# Patient Record
Sex: MALE | Race: WHITE | NOT HISPANIC OR LATINO | URBAN - METROPOLITAN AREA
[De-identification: names, ages, dates, MRNs, and addresses within clinical notes are randomized per-mention and may not be internally consistent; named-entity substitution may affect disease eponyms.]

---

## 2022-09-23 ENCOUNTER — OFFICE (OUTPATIENT)
Dept: URBAN - METROPOLITAN AREA CLINIC 18 | Facility: CLINIC | Age: 52
End: 2022-09-23
Payer: COMMERCIAL

## 2022-09-23 VITALS
SYSTOLIC BLOOD PRESSURE: 124 MMHG | HEART RATE: 78 BPM | HEIGHT: 64 IN | DIASTOLIC BLOOD PRESSURE: 82 MMHG | WEIGHT: 219 LBS

## 2022-09-23 DIAGNOSIS — K70.31 ALCOHOLIC CIRRHOSIS OF LIVER WITH ASCITES: ICD-10-CM

## 2022-09-23 LAB
AFP- TUMOR MARKER: AFP - TUMOR MARKER: 7.9 NG/ML — HIGH
BASIC METABOLIC PANEL: BLOOD UREA NITROGEN: 9 MG/DL
BASIC METABOLIC PANEL: BUN/CREAT RATIO: 15
BASIC METABOLIC PANEL: CALCIUM: 10.4 MG/DL
BASIC METABOLIC PANEL: CHLORIDE: 97 MEQ/L
BASIC METABOLIC PANEL: CO2: 22 MEQ/L
BASIC METABOLIC PANEL: CREATININE: 0.6 MG/DL
BASIC METABOLIC PANEL: FASTING STATUS: (no result)
BASIC METABOLIC PANEL: GLOMERULAR FILTRATION RATE (GFR): 117 MLS/MIN/1.73M2
BASIC METABOLIC PANEL: GLUCOSE,RANDOM: 111 MG/DL — HIGH
BASIC METABOLIC PANEL: POTASSIUM: 4.1 MEQ/L
BASIC METABOLIC PANEL: SODIUM: 134 MEQ/L — LOW
FERRITIN: 441 NG/ML — HIGH
IRON PROFILE: IRON BINDING CAPACITY: 276 MCG/DL
IRON PROFILE: IRON SATURATION: 26 %
IRON PROFILE: IRON: 73 MCG/DL
MAGNESIUM: 1.7 MG/DL

## 2022-09-23 PROCEDURE — 99214 OFFICE O/P EST MOD 30 MIN: CPT | Mod: SA | Performed by: NURSE PRACTITIONER

## 2022-11-25 ENCOUNTER — APPOINTMENT (OUTPATIENT)
Dept: GENERAL RADIOLOGY | Age: 52
DRG: 313 | End: 2022-11-25
Payer: COMMERCIAL

## 2022-11-25 ENCOUNTER — HOSPITAL ENCOUNTER (INPATIENT)
Age: 52
LOS: 1 days | Discharge: NURSING, REHAB OR LONG-TERM CARE | DRG: 313 | End: 2022-11-29
Attending: EMERGENCY MEDICINE | Admitting: STUDENT IN AN ORGANIZED HEALTH CARE EDUCATION/TRAINING PROGRAM
Payer: COMMERCIAL

## 2022-11-25 DIAGNOSIS — S82.142A TIBIAL PLATEAU FRACTURE, LEFT, CLOSED, INITIAL ENCOUNTER: Primary | ICD-10-CM

## 2022-11-25 PROCEDURE — 99285 EMERGENCY DEPT VISIT HI MDM: CPT

## 2022-11-25 PROCEDURE — 73562 X-RAY EXAM OF KNEE 3: CPT

## 2022-11-25 PROCEDURE — 6370000000 HC RX 637 (ALT 250 FOR IP): Performed by: EMERGENCY MEDICINE

## 2022-11-25 RX ORDER — LACTULOSE 10 G/15ML
10 SOLUTION ORAL 2 TIMES DAILY
COMMUNITY
Start: 2022-11-13

## 2022-11-25 RX ORDER — SPIRONOLACTONE 100 MG/1
150 TABLET, FILM COATED ORAL DAILY
COMMUNITY
Start: 2022-10-29

## 2022-11-25 RX ORDER — IBUPROFEN 400 MG/1
800 TABLET ORAL ONCE
Status: COMPLETED | OUTPATIENT
Start: 2022-11-25 | End: 2022-11-25

## 2022-11-25 RX ORDER — FUROSEMIDE 40 MG/1
60 TABLET ORAL DAILY
COMMUNITY
Start: 2022-10-29

## 2022-11-25 RX ORDER — ALBUTEROL SULFATE 90 UG/1
AEROSOL, METERED RESPIRATORY (INHALATION)
COMMUNITY
Start: 2021-07-16

## 2022-11-25 RX ADMIN — IBUPROFEN 800 MG: 400 TABLET, FILM COATED ORAL at 23:37

## 2022-11-25 ASSESSMENT — PAIN - FUNCTIONAL ASSESSMENT: PAIN_FUNCTIONAL_ASSESSMENT: 0-10

## 2022-11-25 ASSESSMENT — PAIN DESCRIPTION - ORIENTATION: ORIENTATION: LEFT

## 2022-11-25 ASSESSMENT — PAIN DESCRIPTION - LOCATION: LOCATION: KNEE

## 2022-11-25 ASSESSMENT — PAIN SCALES - GENERAL: PAINLEVEL_OUTOF10: 3

## 2022-11-26 ENCOUNTER — APPOINTMENT (OUTPATIENT)
Dept: CT IMAGING | Age: 52
DRG: 313 | End: 2022-11-26
Payer: COMMERCIAL

## 2022-11-26 PROBLEM — S82.142A TIBIAL PLATEAU FRACTURE, LEFT, CLOSED, INITIAL ENCOUNTER: Status: ACTIVE | Noted: 2022-11-26

## 2022-11-26 LAB
ANION GAP SERPL CALCULATED.3IONS-SCNC: 11 MMOL/L (ref 4–16)
BASOPHILS ABSOLUTE: 0.1 K/CU MM
BASOPHILS RELATIVE PERCENT: 0.6 % (ref 0–1)
BUN BLDV-MCNC: 16 MG/DL (ref 6–23)
CALCIUM SERPL-MCNC: 11.3 MG/DL (ref 8.3–10.6)
CHLORIDE BLD-SCNC: 101 MMOL/L (ref 99–110)
CO2: 24 MMOL/L (ref 21–32)
CREAT SERPL-MCNC: 0.6 MG/DL (ref 0.9–1.3)
DIFFERENTIAL TYPE: ABNORMAL
EOSINOPHILS ABSOLUTE: 0.5 K/CU MM
EOSINOPHILS RELATIVE PERCENT: 6.3 % (ref 0–3)
GFR SERPL CREATININE-BSD FRML MDRD: >60 ML/MIN/1.73M2
GLUCOSE BLD-MCNC: 111 MG/DL (ref 70–99)
HCT VFR BLD CALC: 41.7 % (ref 42–52)
HEMOGLOBIN: 14.5 GM/DL (ref 13.5–18)
IMMATURE NEUTROPHIL %: 0.2 % (ref 0–0.43)
LYMPHOCYTES ABSOLUTE: 1.6 K/CU MM
LYMPHOCYTES RELATIVE PERCENT: 19.1 % (ref 24–44)
MCH RBC QN AUTO: 33.4 PG (ref 27–31)
MCHC RBC AUTO-ENTMCNC: 34.8 % (ref 32–36)
MCV RBC AUTO: 96.1 FL (ref 78–100)
MONOCYTES ABSOLUTE: 0.6 K/CU MM
MONOCYTES RELATIVE PERCENT: 7.1 % (ref 0–4)
NUCLEATED RBC %: 0 %
PDW BLD-RTO: 13.6 % (ref 11.7–14.9)
PLATELET # BLD: 126 K/CU MM (ref 140–440)
POTASSIUM SERPL-SCNC: 4.2 MMOL/L (ref 3.5–5.1)
RBC # BLD: 4.34 M/CU MM (ref 4.6–6.2)
SEGMENTED NEUTROPHILS ABSOLUTE COUNT: 5.6 K/CU MM
SEGMENTED NEUTROPHILS RELATIVE PERCENT: 66.7 % (ref 36–66)
SODIUM BLD-SCNC: 136 MMOL/L (ref 135–145)
TOTAL IMMATURE NEUTOROPHIL: 0.02 K/CU MM
TOTAL NUCLEATED RBC: 0 K/CU MM
WBC # BLD: 8.4 K/CU MM (ref 4–10.5)

## 2022-11-26 PROCEDURE — G0378 HOSPITAL OBSERVATION PER HR: HCPCS | Performed by: STUDENT IN AN ORGANIZED HEALTH CARE EDUCATION/TRAINING PROGRAM

## 2022-11-26 PROCEDURE — 96374 THER/PROPH/DIAG INJ IV PUSH: CPT

## 2022-11-26 PROCEDURE — 99221 1ST HOSP IP/OBS SF/LOW 40: CPT | Performed by: ORTHOPAEDIC SURGERY

## 2022-11-26 PROCEDURE — 6360000002 HC RX W HCPCS: Performed by: INTERNAL MEDICINE

## 2022-11-26 PROCEDURE — 80048 BASIC METABOLIC PNL TOTAL CA: CPT

## 2022-11-26 PROCEDURE — 6370000000 HC RX 637 (ALT 250 FOR IP): Performed by: INTERNAL MEDICINE

## 2022-11-26 PROCEDURE — 94761 N-INVAS EAR/PLS OXIMETRY MLT: CPT

## 2022-11-26 PROCEDURE — 6370000000 HC RX 637 (ALT 250 FOR IP): Performed by: NURSE PRACTITIONER

## 2022-11-26 PROCEDURE — G0378 HOSPITAL OBSERVATION PER HR: HCPCS

## 2022-11-26 PROCEDURE — 85025 COMPLETE CBC W/AUTO DIFF WBC: CPT

## 2022-11-26 PROCEDURE — 73700 CT LOWER EXTREMITY W/O DYE: CPT

## 2022-11-26 PROCEDURE — 2580000003 HC RX 258: Performed by: INTERNAL MEDICINE

## 2022-11-26 RX ORDER — ALBUTEROL SULFATE 90 UG/1
2 AEROSOL, METERED RESPIRATORY (INHALATION) EVERY 6 HOURS PRN
Status: DISCONTINUED | OUTPATIENT
Start: 2022-11-26 | End: 2022-11-29 | Stop reason: HOSPADM

## 2022-11-26 RX ORDER — ONDANSETRON 4 MG/1
4 TABLET, ORALLY DISINTEGRATING ORAL EVERY 8 HOURS PRN
Status: DISCONTINUED | OUTPATIENT
Start: 2022-11-26 | End: 2022-11-29 | Stop reason: HOSPADM

## 2022-11-26 RX ORDER — FUROSEMIDE 10 MG/ML
60 INJECTION INTRAMUSCULAR; INTRAVENOUS ONCE
Status: COMPLETED | OUTPATIENT
Start: 2022-11-26 | End: 2022-11-26

## 2022-11-26 RX ORDER — ENOXAPARIN SODIUM 100 MG/ML
30 INJECTION SUBCUTANEOUS 2 TIMES DAILY
Status: DISCONTINUED | OUTPATIENT
Start: 2022-11-26 | End: 2022-11-29 | Stop reason: HOSPADM

## 2022-11-26 RX ORDER — POLYETHYLENE GLYCOL 3350 17 G/17G
17 POWDER, FOR SOLUTION ORAL DAILY PRN
Status: DISCONTINUED | OUTPATIENT
Start: 2022-11-26 | End: 2022-11-29 | Stop reason: HOSPADM

## 2022-11-26 RX ORDER — M-VIT,TX,IRON,MINS/CALC/FOLIC 27MG-0.4MG
1 TABLET ORAL DAILY
Status: DISCONTINUED | OUTPATIENT
Start: 2022-11-26 | End: 2022-11-29 | Stop reason: HOSPADM

## 2022-11-26 RX ORDER — SODIUM CHLORIDE 9 MG/ML
INJECTION, SOLUTION INTRAVENOUS PRN
Status: DISCONTINUED | OUTPATIENT
Start: 2022-11-26 | End: 2022-11-28

## 2022-11-26 RX ORDER — ONDANSETRON 2 MG/ML
4 INJECTION INTRAMUSCULAR; INTRAVENOUS EVERY 6 HOURS PRN
Status: DISCONTINUED | OUTPATIENT
Start: 2022-11-26 | End: 2022-11-29 | Stop reason: HOSPADM

## 2022-11-26 RX ORDER — IBUPROFEN 600 MG/1
600 TABLET ORAL EVERY 6 HOURS PRN
Status: DISCONTINUED | OUTPATIENT
Start: 2022-11-26 | End: 2022-11-29 | Stop reason: HOSPADM

## 2022-11-26 RX ORDER — ACETAMINOPHEN 325 MG/1
650 TABLET ORAL EVERY 6 HOURS PRN
Status: DISCONTINUED | OUTPATIENT
Start: 2022-11-26 | End: 2022-11-29 | Stop reason: HOSPADM

## 2022-11-26 RX ORDER — SODIUM CHLORIDE 0.9 % (FLUSH) 0.9 %
5-40 SYRINGE (ML) INJECTION PRN
Status: DISCONTINUED | OUTPATIENT
Start: 2022-11-26 | End: 2022-11-29 | Stop reason: HOSPADM

## 2022-11-26 RX ORDER — LACTULOSE 10 G/15ML
30 SOLUTION ORAL 2 TIMES DAILY
Status: DISCONTINUED | OUTPATIENT
Start: 2022-11-26 | End: 2022-11-29 | Stop reason: HOSPADM

## 2022-11-26 RX ORDER — SPIRONOLACTONE 50 MG/1
150 TABLET, FILM COATED ORAL
Status: DISCONTINUED | OUTPATIENT
Start: 2022-11-26 | End: 2022-11-29 | Stop reason: HOSPADM

## 2022-11-26 RX ORDER — ACETAMINOPHEN 650 MG/1
650 SUPPOSITORY RECTAL EVERY 6 HOURS PRN
Status: DISCONTINUED | OUTPATIENT
Start: 2022-11-26 | End: 2022-11-29 | Stop reason: HOSPADM

## 2022-11-26 RX ORDER — SODIUM CHLORIDE 0.9 % (FLUSH) 0.9 %
5-40 SYRINGE (ML) INJECTION EVERY 12 HOURS SCHEDULED
Status: DISCONTINUED | OUTPATIENT
Start: 2022-11-26 | End: 2022-11-29 | Stop reason: HOSPADM

## 2022-11-26 RX ADMIN — ACETAMINOPHEN 650 MG: 325 TABLET ORAL at 20:23

## 2022-11-26 RX ADMIN — FUROSEMIDE 60 MG: 10 INJECTION, SOLUTION INTRAVENOUS at 09:50

## 2022-11-26 RX ADMIN — IBUPROFEN 600 MG: 600 TABLET ORAL at 22:19

## 2022-11-26 RX ADMIN — POLYETHYLENE GLYCOL 3350 17 G: 17 POWDER, FOR SOLUTION ORAL at 20:24

## 2022-11-26 RX ADMIN — LACTULOSE 30 G: 10 SOLUTION ORAL at 20:23

## 2022-11-26 RX ADMIN — SPIRONOLACTONE 150 MG: 50 TABLET ORAL at 14:15

## 2022-11-26 RX ADMIN — SODIUM CHLORIDE, PRESERVATIVE FREE 10 ML: 5 INJECTION INTRAVENOUS at 22:14

## 2022-11-26 RX ADMIN — LACTULOSE 30 G: 10 SOLUTION ORAL at 09:47

## 2022-11-26 ASSESSMENT — ENCOUNTER SYMPTOMS
RHINORRHEA: 0
EYE REDNESS: 0
WHEEZING: 0
CHEST TIGHTNESS: 0
SORE THROAT: 0
COUGH: 0
VOMITING: 0
COLOR CHANGE: 0
SINUS PRESSURE: 0
CONSTIPATION: 0
SHORTNESS OF BREATH: 0
ABDOMINAL PAIN: 0
BACK PAIN: 0
NAUSEA: 0
DIARRHEA: 0

## 2022-11-26 ASSESSMENT — PAIN SCALES - GENERAL: PAINLEVEL_OUTOF10: 4

## 2022-11-26 ASSESSMENT — PAIN DESCRIPTION - LOCATION: LOCATION: LEG

## 2022-11-26 ASSESSMENT — PAIN DESCRIPTION - ORIENTATION: ORIENTATION: LEFT

## 2022-11-26 ASSESSMENT — PAIN DESCRIPTION - DESCRIPTORS: DESCRIPTORS: ACHING

## 2022-11-26 NOTE — CONSULTS
Inpatient consult to Orthopedic Surgery  Consult performed by: Heather Nicholas DO  Consult ordered by: Emanuel Galvan MD  Reason for consult: Left tibial plateau fracture  Assessment/Recommendations:     Left lateral tibial plateau fracture    I discussed with him today his x-ray findings. I explained to him that he does have a depressed fracture of his left tibial plateau which will require surgical treatment. I will order a CT scan for further evaluation of the fracture pattern. I discussed with him today performing open reduction and internal fixation of his left lateral tibial plateau fracture. I explained risks, benefits, possible complications of the procedure and answered all questions for the patient. I explained postoperative rehabilitation protocol and expectations with the patient today. The patient understands and consents to the procedure. Continue bedrest.  Remain nonweightbearing/no lifting pushing or pulling on the injured extremity  Ice and elevate as needed. Pain medication as needed. Patient will be kept n.p.o. Emile night for planned surgical treatment Monday morning    Jose M Mendez is a 60-year-old male who was walking into his house yesterday when he tripped and fell twisting and landing onto his left knee. He had immediate pain and was unable to bear weight on the left lower extremity. He was brought to the ED, x-rays were obtained and he was diagnosed with a left tibial plateau fracture. He was subsequently admitted to the hospitalist for medical management and I was consulted for evaluation treatment of his left knee injury. He is currently complaining of deep, aching and throbbing pain globally around his left knee. Patient denies any new injury to the involved extremity/ joint, denies numbness or tingling in the involved extremity and denies fever or chills. Review of Systems   Constitutional:  Negative for activity change, chills and fever.    HENT:  Negative for congestion and drooling. Eyes:  Negative for redness. Respiratory:  Negative for chest tightness. Cardiovascular:  Negative for chest pain. Gastrointestinal:  Negative for abdominal pain. Endocrine: Negative for cold intolerance and heat intolerance. Musculoskeletal:  Positive for arthralgias, gait problem, joint swelling and myalgias. Negative for back pain. Skin:  Negative for color change, pallor, rash and wound. Neurological:  Negative for weakness and numbness. Psychiatric/Behavioral:  Negative for confusion. Past Medical History:   Diagnosis Date    Alcoholic cirrhosis of liver (HCC)     Esophageal varices ([address]     Neuropathy      No current facility-administered medications on file prior to encounter. Current Outpatient Medications on File Prior to Encounter   Medication Sig Dispense Refill    albuterol sulfate HFA (PROVENTIL;VENTOLIN;PROAIR) 108 (90 Base) MCG/ACT inhaler inhale 2 puff by inhalation route  every 4 - 6 hours as needed      spironolactone (ALDACTONE) 100 MG tablet       lactulose (CHRONULAC) 10 GM/15ML solution       furosemide (LASIX) 40 MG tablet        Allergies   Allergen Reactions    Prednisone Swelling     Past Surgical History:   Procedure Laterality Date    APPENDECTOMY      HERNIA REPAIR      LIVER TRANSPLANT      liver stent     Social History     Tobacco Use    Smoking status: Every Day     Packs/day: 0.50     Types: Cigarettes     Passive exposure: Current    Smokeless tobacco: Never   Substance Use Topics    Alcohol use: Not Currently    Drug use: Never     History reviewed. No pertinent family history. Right Ankle Exam   Right ankle exam is normal.    Tenderness   The patient is experiencing no tenderness. Swelling: mild    Range of Motion   The patient has normal right ankle ROM. Muscle Strength   The patient has normal right ankle strength.     Other   Erythema: absent  Sensation: normal  Pulse: present       Left Ankle Exam   Left ankle exam is normal.    Tenderness   The patient is experiencing no tenderness. Swelling: mild    Range of Motion   The patient has normal left ankle ROM. Muscle Strength   The patient has normal left ankle strength. Other   Erythema: absent  Sensation: normal  Pulse: present      Right Knee Exam     Muscle Strength   The patient has normal right knee strength. Tenderness   The patient is experiencing no tenderness. Range of Motion   The patient has normal right knee ROM. Other   Erythema: absent  Sensation: normal  Pulse: present  Swelling: none  Effusion: no effusion present      Left Knee Exam     Tenderness   The patient is experiencing tenderness in the lateral joint line. Other   Erythema: absent  Sensation: normal  Pulse: present  Swelling: moderate  Effusion: effusion present    Comments:  Left knee-Skin intact with no erythema, ecchymosis or lacerations present. Moderate knee effusion  Full range of motion not assessed due to recent injury. Intact sensation motor function to all nerve distributions of the extremity. +2 DP  Compartment soft. Chronic peripheral neuropathy to the bilateral lower extremities and chronic swelling to the bilateral lower extremities from chronic liver disease. BP (!) 118/58   Pulse 80   Temp 98.6 °F (37 °C) (Oral)   Resp 16   Ht 5' 5.5\" (1.664 m)   Wt 225 lb (102.1 kg)   SpO2 95%   BMI 36.87 kg/m²     XR KNEE LEFT (3 VIEWS)    Result Date: 11/26/2022  EXAMINATION: THREE X-RAY VIEWS OF THE LEFT KNEE 11/25/2022 11:26 pm COMPARISON: None HISTORY: ORDERING SYSTEM PROVIDED HISTORY:  Fall, left knee pain TECHNOLOGIST PROVIDED HISTORY: Reason for Exam:  Fall, left knee pain Reason for Exam: Twisted knee FINDINGS: Three views of the left knee demonstrate an acute, slightly depressed fracture of the lateral tibial plateau. Findings are associated with of large lipohemarthrosis. Elsewhere, no acute fracture or dislocation.   No significant degenerative changes. 1. Acute, slightly depressed fracture of the lateral tibial plateau. 2. Large lipohemarthrosis.

## 2022-11-26 NOTE — PROGRESS NOTES
4 EYES Skin Assessment COMPLETED with this Nurse and Yoly Holden RN. Skin was found to be WNL/unremarkable. No Skin issues found upon assessment.

## 2022-11-26 NOTE — H&P
History and Physical      Name:  Nuzhat Rocha /Age/Sex: 1970  (47 y.o. male)   MRN & CSN:  [id number] & [id number] Encounter Date/Time: 2022 3:14 AM EST   Location:  [address] [place] [address]-A PCP: De Cortez MD       Hospital Day: 2    Assessment and Plan:     #. Acute, slightly depressed fracture of the lateral tibial plateau  -Strict bedrest until evaluated by orthopedic surgeon  -PT/OT evaluation when appropriate. #.  Alcoholic liver cirrhosis  -Patient had TIPS-  -Patient is on Lasix, spironolactone, lactulose  -We will give a dose of Lasix IV today and resume home dose of p.o. Lasix from tomorrow-as patient is complaining of increasing swelling in his lower extremities and feeling congested. #.  History of GI bleed-variceal bleed-2021    #. History of hepatic encephalopathy  -Continue lactulose    #. Thrombocytopenia secondary to cirrhosis  -Platelets not yet reported on CBC today. #.  Chronic tobacco dependence  -Patient admits to smoking half pack per day, chews tobacco  -Counseling provided    Disposition:   Current Living situation: home    Diet ADULT DIET; Regular; Low Sodium (2 gm)   DVT Prophylaxis Hold DVT prophylaxis until platelet count reported. Code Status Full Code   Surrogate Decision Maker/ POA      History from:   EMR, patient. History of Present Illness:     Chief Complaint: Tibial plateau fracture, left, closed, initial encounter  Nuzhat Rocha is a 47 y.o. male with alcoholic liver cirrhosis, history of hepatic encephalopathy, alcoholic hepatitis, thrombocytopenia, chronic tobacco dependence presented to [facility] ED after having a fall. Patient reported that he was bending over to lift an animal cage, the front side of the cage got caught on the stairs, the backside caught on his sweatpants, causing him to trip and fall on the cage. Patient was unable to bear weight since then. Patient denied hitting his head or losing consciousness.   Patient denying any headache, visual disturbance, nausea, vomiting, fever, chills, cough, chest pain, shortness of breath, denied any abdominal pain, denied any urinary complaints, denied any constipation or diarrhea. Patient admits to noticing increasing swelling in his bilateral lower extremities, also feels that fluid is building up in his lungs. At presentation patient was noted to have /84, HR 83, RR 18, temp 99, saturating 96% on room air. Labs significant for random glucose 111, calcium 11.3, hemoglobin 14.4, platelets-pending. X-ray of the left knee-lateral tibial plateau fracture. Patient was placed in a knee immobilizer and patient transferred for further evaluation by orthopedic surgery, PT/OT. Patient received ibuprofen in ED. Review of Systems: Need 10 Elements   10 point review of systems conducted and pertinent positives and negatives as per HPI. Objective:   No intake or output data in the 24 hours ending 11/26/22 0314   Vitals:   Vitals:    11/25/22 2313 11/26/22 0246   BP: 127/84 117/71   Pulse: 83 78   Resp: 18 16   Temp: 99 °F (37.2 °C) 98 °F (36.7 °C)   TempSrc: Infrared Oral   SpO2: 96% 97%   Weight: 225 lb (102.1 kg)    Height: 5' 5.5\" (1.664 m)        Medications Prior to Admission   Reviewed medications with patient    Prior to Admission medications    Medication Sig Start Date End Date Taking? Authorizing Provider   albuterol sulfate HFA (PROVENTIL;VENTOLIN;PROAIR) 108 (90 Base) MCG/ACT inhaler inhale 2 puff by inhalation route  every 4 - 6 hours as needed 7/16/21  Yes Historical Provider, MD   spironolactone (ALDACTONE) 100 MG tablet  10/29/22   Historical Provider, MD   lactulose ([employer]) 10 GM/15ML solution  11/13/22   Historical Provider, MD   furosemide (LASIX) 40 MG tablet  10/29/22   Historical Provider, MD       Physical Exam: Need 8 Elements   Physical Exam     GEN  -Awake, alert, NAD.   EYES   -PERRL. HENT  -MM are moist.   RESP  -LS CTA equal bilat, no wheezes, rales or rhonchi. Symmetric chest movement. No respiratory distress noted. C/V  -S1/S2 auscultated. RRR without appreciable M/R/G. No JVD or carotid bruits. Peripheral pulses equal bilaterally and palpable. + peripheral edema. No reproducible chest wall tenderness. GI  -Abdomen is soft non-distended, no significant tenderness. No masses or guarding. + BS in all quadrants. Rectal exam deferred.   -No CVA tenderness. Desouza catheter is not present. MS  -left knee-in a knee immobilizer, bilateral lower extremity swelling. SKIN  -Normal coloration, warm, dry. NEURO  - Awake, alert, oriented x 3 no focal deficits. ,   PSYC  - Appropriate affect. Past Medical History:   Reviewed patient's past medical, surgical, social, family history and allergies. PMHx   Past Medical History:   Diagnosis Date    Alcoholic cirrhosis of liver ([address]     Esophageal varices ([address]     Neuropathy      PSHX:  has a past surgical history that includes Appendectomy; hernia repair; and Liver transplant. Allergies: Allergies   Allergen Reactions    Prednisone Swelling     Fam HX: family history is not on file. Soc HX: Admits to smoking half pack per day, chews tobacco, quit alcohol, denied any illicit drug use.   Social History     Socioeconomic History    Marital status: Single     Spouse name: None    Number of children: None    Years of education: None    Highest education level: None   Tobacco Use    Smoking status: Every Day     Packs/day: 0.50     Types: Cigarettes     Passive exposure: Current    Smokeless tobacco: Never   Substance and Sexual Activity    Alcohol use: Not Currently    Drug use: Never       Medications:   Medications:    sodium chloride flush  5-40 mL IntraVENous 2 times per day    enoxaparin  30 mg SubCUTAneous BID      Infusions:    sodium chloride       PRN Meds: sodium chloride flush, 5-40 mL, PRN  sodium chloride, , PRN  ondansetron, 4 mg, Q8H PRN   Or  ondansetron, 4 mg, Q6H PRN  polyethylene glycol, 17 g, Daily PRN  acetaminophen, 650 mg, Q6H PRN   Or  acetaminophen, 650 mg, Q6H PRN        Labs      CBC: No results for input(s): WBC, HGB, PLT in the last 72 hours. BMP:    Recent Labs     11/26/22  0041      K 4.2      CO2 24   BUN 16   CREATININE 0.6*   GLUCOSE 111*     Hepatic: No results for input(s): AST, ALT, ALB, BILITOT, ALKPHOS in the last 72 hours. Lipids: No results found for: CHOL, HDL, TRIG  Hemoglobin A1C: No results found for: LABA1C  TSH: No results found for: TSH  Troponin: No results found for: TROPONINT  Lactic Acid: No results for input(s): LACTA in the last 72 hours. BNP: No results for input(s): PROBNP in the last 72 hours. UA:No results found for: Maryfrances Poplin, PHUR, LABCAST, WBCUA, RBCUA, MUCUS, TRICHOMONAS, YEAST, BACTERIA, CLARITYU, SPECGRAV, LEUKOCYTESUR, UROBILINOGEN, BILIRUBINUR, BLOODU, GLUCOSEU, KETUA, AMORPHOUS  Urine Cultures: No results found for: LABURIN  Blood Cultures: No results found for: BC  No results found for: BLOODCULT2  Organism: No results found for: ORG    Imaging/Diagnostics Last 24 Hours   XR KNEE LEFT (3 VIEWS)    Result Date: 11/26/2022  1. Acute, slightly depressed fracture of the lateral tibial plateau. 2. Large lipohemarthrosis. Personally reviewed Lab Studies, Imaging, and discussed case with ED physician.     Electronically signed by Chintan Mayer MD on 11/26/2022 at 3:14 AM

## 2022-11-26 NOTE — PLAN OF CARE
The patient got admitted overnight for acute lateral tibial plateau fracture. Underlying  alcoholic cirrhosis with TIPSS in 2021. Orthopedic on board. NPO for now. Will continue to follow.

## 2022-11-26 NOTE — ED PROVIDER NOTES
Emergency Department Encounter    Patient: Albina Brown  MRN: [id number]  : 1970  Date of Evaluation: 2022  ED Provider:  [id number] [facility]     Triage Chief Complaint:   Knee Pain (L knee injury. Twisted knee after getting pant leg caught in a cage)    HPI:  Albina Brown is a 47 y.o. male with past medical history significant for liver failure and neuropathy who presents with pain in his left knee. Patient had a mechanical fall earlier tonight, he was picking up a 3 foot animal cage, tripped on his pant leg, falling and landing on his left knee. Denies any head trauma, LOC, neck or back pain. Patient has been unable to weight-bear on his left lower extremity since. Denies F/C, CP, SOB, palpitations, N/V, abdominal pain, dysuria, diarrhea and constipatin. ROS:  Review of Systems   Constitutional:  Negative for chills and fever. HENT:  Negative for congestion, rhinorrhea, sinus pressure and sore throat. Eyes:  Negative for visual disturbance. Respiratory:  Negative for cough, shortness of breath and wheezing. Cardiovascular:  Negative for chest pain and palpitations. Gastrointestinal:  Negative for abdominal pain, constipation, diarrhea, nausea and vomiting. Genitourinary:  Negative for dysuria, frequency and urgency. Musculoskeletal:  Positive for arthralgias and myalgias. Skin:  Negative for rash. Neurological:  Negative for dizziness and syncope. Psychiatric/Behavioral:  Negative for agitation, behavioral problems and confusion. Past Medical History:   Diagnosis Date    Alcoholic cirrhosis of liver ([facility]     Esophageal varices ([facility]     Neuropathy      Past Surgical History:   Procedure Laterality Date    APPENDECTOMY      HERNIA REPAIR      LIVER TRANSPLANT      liver stent     History reviewed. No pertinent family history.   Social History     Socioeconomic History    Marital status: Single     Spouse name: Not on file    Number of children: Not on file Years of education: Not on file    Highest education level: Not on file   Occupational History    Not on file   Tobacco Use    Smoking status: Every Day     Packs/day: 0.50     Types: Cigarettes     Passive exposure: Current    Smokeless tobacco: Never   Substance and Sexual Activity    Alcohol use: Not Currently    Drug use: Never    Sexual activity: Not on file   Other Topics Concern    Not on file   Social History Narrative    Not on file     Social Determinants of Health     Financial Resource Strain: Not on file   Food Insecurity: Not on file   Transportation Needs: Not on file   Physical Activity: Not on file   Stress: Not on file   Social Connections: Not on file   Intimate Partner Violence: Not on file   Housing Stability: Not on file     No current facility-administered medications for this encounter. Current Outpatient Medications   Medication Sig Dispense Refill    albuterol sulfate HFA (PROVENTIL HFA) 108 (90 Base) MCG/ACT inhaler inhale 2 puff by inhalation route  every 4 - 6 hours as needed      spironolactone (ALDACTONE) 100 MG tablet       lactulose (CHRONULAC) 10 GM/15ML solution       furosemide (LASIX) 40 MG tablet        Allergies   Allergen Reactions    Prednisone Swelling       Nursing Notes Reviewed    Physical Exam:  Triage VS:    ED Triage Vitals [11/25/22 2313]   Enc Vitals Group      /84      Heart Rate 83      Resp 18      Temp 99 °F (37.2 °C)      Temp Source Infrared      SpO2 96 %      Weight 225 lb (102.1 kg)      Height 5' 5.5\" (1.664 m)      Head Circumference       Peak Flow       Pain Score       Pain Loc       Pain Edu? Excl. in [address]? Physical Exam  Vitals and nursing note reviewed. Constitutional:       General: He is not in acute distress. Appearance: Normal appearance. He is not ill-appearing or toxic-appearing. HENT:      Head: Normocephalic and atraumatic.       Nose: Nose normal.      Mouth/Throat:      Mouth: Mucous membranes are moist.   Eyes: Conjunctiva/sclera: Conjunctivae normal.   Cardiovascular:      Rate and Rhythm: Normal rate and regular rhythm. Heart sounds: Normal heart sounds. Pulmonary:      Effort: Pulmonary effort is normal. No respiratory distress. Breath sounds: Normal breath sounds. No wheezing, rhonchi or rales. Abdominal:      General: Abdomen is flat. Bowel sounds are normal. There is no distension. Palpations: Abdomen is soft. Tenderness: There is no abdominal tenderness. There is no right CVA tenderness, left CVA tenderness, guarding or rebound. Musculoskeletal:      Left knee: Swelling, effusion, ecchymosis and bony tenderness present. No crepitus. Decreased range of motion. Tenderness present over the lateral joint line and LCL. Comments: DP, PT pulses intact. Station diffusely intact. Skin:     General: Skin is warm. Capillary Refill: Capillary refill takes less than 2 seconds. Neurological:      Mental Status: He is alert and oriented to person, place, and time. Mental status is at baseline. Psychiatric:         Mood and Affect: Mood normal.            I have reviewed and interpreted all of the currently available lab results from this visit (if applicable):  Results for orders placed or performed during the hospital encounter of 11/25/22   BMP   Result Value Ref Range    Sodium 136 135 - 145 MMOL/L    Potassium 4.2 3.5 - 5.1 MMOL/L    Chloride 101 99 - 110 mMol/L    CO2 24 21 - 32 MMOL/L    Anion Gap 11 4 - 16    BUN 16 6 - 23 MG/DL    Creatinine 0.6 (L) 0.9 - 1.3 MG/DL    Est, Glom Filt Rate >60 >60 mL/min/1.73m2    Glucose 111 (H) 70 - 99 MG/DL    Calcium 11.3 (H) 8.3 - 10.6 MG/DL      Radiographs (if obtained):    [] Radiologist's Report Reviewed:  XR KNEE LEFT (3 VIEWS)   Preliminary Result   1. Acute, slightly depressed fracture of the lateral tibial plateau. 2. Large lipohemarthrosis. Chart review shows recent radiographs:  No results found.     MDM:  Patient seen and examined. Patient's left lower extremity is neurovascular intact. Plain films obtained, acute, slightly depressed fracture of the lateral tibial plateau. Large lipohemarthrosis seen. BMP without significant abnormality. At time of completion of this note, CBC is pending, as the lab is unable to run a CBC here in this facility secondary to reagent not being available. This is sent to [facility]. Did discuss x-rays with patient, and discussed outpatient follow-up with strict nonweightbearing and use of crutches versus admission for PT OT and orthopedic evaluation. Patient states that he is unable to use crutches and be fully nonweightbearing on his lower extremities secondary to chronic pain and weakness in his upper body. As patient would be unable to be completely nonweightbearing on the leg, decision made to transfer and admit patient. Initially patient does request [address] is not excepting patients unless for emergent surgical needs. Patient is agreeable to [facility] for further evaluation and management. Case discussed with Dr. Gila Hennessy the hospitalist who agrees with transfer and admission. Clinical Impression:  1. Tibial plateau fracture, left, closed, initial encounter      Disposition referral (if applicable):  No follow-up provider specified. Disposition medications (if applicable):  New Prescriptions    No medications on file       Comment: Please note this report has been produced using speech recognition software and may contain errors related to that system including errors in grammar, punctuation, and spelling, as well as words and phrases that may be inappropriate. Efforts were made to edit the dictations.        [address] [place] [address],   11/26/22 0116

## 2022-11-26 NOTE — ED NOTES
Report called by Matthew Mora to ABDI Siddiqui @ [facility] ext [phone] bed assn [phone].      Jessica Walker RN  11/26/22 4626

## 2022-11-27 LAB
AMMONIA: 91 UMOL/L (ref 16–60)
ANION GAP SERPL CALCULATED.3IONS-SCNC: 9 MMOL/L (ref 4–16)
BASOPHILS ABSOLUTE: 0 K/CU MM
BASOPHILS RELATIVE PERCENT: 0.6 % (ref 0–1)
BUN BLDV-MCNC: 13 MG/DL (ref 6–23)
CALCIUM SERPL-MCNC: 9.1 MG/DL (ref 8.3–10.6)
CHLORIDE BLD-SCNC: 97 MMOL/L (ref 99–110)
CO2: 27 MMOL/L (ref 21–32)
CREAT SERPL-MCNC: 0.6 MG/DL (ref 0.9–1.3)
DIFFERENTIAL TYPE: ABNORMAL
EOSINOPHILS ABSOLUTE: 0.5 K/CU MM
EOSINOPHILS RELATIVE PERCENT: 7.3 % (ref 0–3)
GFR SERPL CREATININE-BSD FRML MDRD: >60 ML/MIN/1.73M2
GLUCOSE BLD-MCNC: 149 MG/DL (ref 70–99)
HCT VFR BLD CALC: 36.9 % (ref 42–52)
HEMOGLOBIN: 12.7 GM/DL (ref 13.5–18)
IMMATURE NEUTROPHIL %: 0.4 % (ref 0–0.43)
INR BLD: 1.15 INDEX
LYMPHOCYTES ABSOLUTE: 1.5 K/CU MM
LYMPHOCYTES RELATIVE PERCENT: 22.4 % (ref 24–44)
MAGNESIUM: 2 MG/DL (ref 1.8–2.4)
MCH RBC QN AUTO: 33.2 PG (ref 27–31)
MCHC RBC AUTO-ENTMCNC: 34.4 % (ref 32–36)
MCV RBC AUTO: 96.3 FL (ref 78–100)
MONOCYTES ABSOLUTE: 0.6 K/CU MM
MONOCYTES RELATIVE PERCENT: 9.2 % (ref 0–4)
NUCLEATED RBC %: 0 %
PDW BLD-RTO: 13.3 % (ref 11.7–14.9)
PLATELET # BLD: 140 K/CU MM (ref 140–440)
POTASSIUM SERPL-SCNC: 3.6 MMOL/L (ref 3.5–5.1)
PROTHROMBIN TIME: 14.9 SECONDS (ref 11.7–14.5)
RBC # BLD: 3.83 M/CU MM (ref 4.6–6.2)
SEGMENTED NEUTROPHILS ABSOLUTE COUNT: 4 K/CU MM
SEGMENTED NEUTROPHILS RELATIVE PERCENT: 60.1 % (ref 36–66)
SODIUM BLD-SCNC: 133 MMOL/L (ref 135–145)
TOTAL IMMATURE NEUTOROPHIL: 0.03 K/CU MM
TOTAL NUCLEATED RBC: 0 K/CU MM
WBC # BLD: 6.7 K/CU MM (ref 4–10.5)

## 2022-11-27 PROCEDURE — 83735 ASSAY OF MAGNESIUM: CPT

## 2022-11-27 PROCEDURE — 6370000000 HC RX 637 (ALT 250 FOR IP): Performed by: STUDENT IN AN ORGANIZED HEALTH CARE EDUCATION/TRAINING PROGRAM

## 2022-11-27 PROCEDURE — 36415 COLL VENOUS BLD VENIPUNCTURE: CPT

## 2022-11-27 PROCEDURE — 6370000000 HC RX 637 (ALT 250 FOR IP): Performed by: NURSE PRACTITIONER

## 2022-11-27 PROCEDURE — 85025 COMPLETE CBC W/AUTO DIFF WBC: CPT

## 2022-11-27 PROCEDURE — 6370000000 HC RX 637 (ALT 250 FOR IP): Performed by: INTERNAL MEDICINE

## 2022-11-27 PROCEDURE — 99232 SBSQ HOSP IP/OBS MODERATE 35: CPT | Performed by: ORTHOPAEDIC SURGERY

## 2022-11-27 PROCEDURE — 94761 N-INVAS EAR/PLS OXIMETRY MLT: CPT

## 2022-11-27 PROCEDURE — 85610 PROTHROMBIN TIME: CPT

## 2022-11-27 PROCEDURE — 82140 ASSAY OF AMMONIA: CPT

## 2022-11-27 PROCEDURE — 2580000003 HC RX 258: Performed by: INTERNAL MEDICINE

## 2022-11-27 PROCEDURE — 80048 BASIC METABOLIC PNL TOTAL CA: CPT

## 2022-11-27 PROCEDURE — G0378 HOSPITAL OBSERVATION PER HR: HCPCS

## 2022-11-27 RX ADMIN — Medication 1 TABLET: at 08:51

## 2022-11-27 RX ADMIN — LACTULOSE 30 G: 10 SOLUTION ORAL at 19:47

## 2022-11-27 RX ADMIN — SODIUM CHLORIDE, PRESERVATIVE FREE 10 ML: 5 INJECTION INTRAVENOUS at 08:51

## 2022-11-27 RX ADMIN — LACTULOSE 30 G: 10 SOLUTION ORAL at 08:51

## 2022-11-27 RX ADMIN — FUROSEMIDE 60 MG: 40 TABLET ORAL at 08:51

## 2022-11-27 RX ADMIN — IBUPROFEN 600 MG: 600 TABLET ORAL at 14:58

## 2022-11-27 RX ADMIN — BISACODYL 10 MG: 5 TABLET, COATED ORAL at 21:20

## 2022-11-27 RX ADMIN — SODIUM CHLORIDE, PRESERVATIVE FREE 10 ML: 5 INJECTION INTRAVENOUS at 19:47

## 2022-11-27 RX ADMIN — SPIRONOLACTONE 150 MG: 50 TABLET ORAL at 11:42

## 2022-11-27 ASSESSMENT — PAIN SCALES - GENERAL
PAINLEVEL_OUTOF10: 0
PAINLEVEL_OUTOF10: 0

## 2022-11-27 ASSESSMENT — ENCOUNTER SYMPTOMS
VOICE CHANGE: 0
BLOOD IN STOOL: 0
EYE DISCHARGE: 0
ABDOMINAL PAIN: 1
CHEST TIGHTNESS: 0
BACK PAIN: 0
COUGH: 0
NAUSEA: 1
SINUS PAIN: 0
SHORTNESS OF BREATH: 0
ABDOMINAL DISTENTION: 1
EYE PAIN: 0
SINUS PRESSURE: 0
VOMITING: 0
DIARRHEA: 1

## 2022-11-27 NOTE — PROGRESS NOTES
Tash Quesada is a 47 y.o. male patient. 1. Tibial plateau fracture, left, closed, initial encounter      Past Medical History:   Diagnosis Date    Alcoholic cirrhosis of liver (HCC)     Esophageal varices (HCC)     Neuropathy      No past surgical history pertinent negatives on file. Scheduled Meds:   sodium chloride flush  5-40 mL IntraVENous 2 times per day    [Held by provider] enoxaparin  30 mg SubCUTAneous BID    furosemide  60 mg Oral Daily    lactulose  30 g Oral BID    spironolactone  150 mg Oral Lunch    multivitamin  1 tablet Oral Daily     Continuous Infusions:   sodium chloride       PRN Meds:sodium chloride flush, sodium chloride, ondansetron **OR** ondansetron, polyethylene glycol, acetaminophen **OR** acetaminophen, albuterol sulfate HFA, ibuprofen    Allergies   Allergen Reactions    Prednisone Swelling     Principal Problem:    Tibial plateau fracture, left, closed, initial encounter  Resolved Problems:    * No resolved hospital problems. *    Blood pressure 115/66, pulse 79, temperature 98 °F (36.7 °C), temperature source Oral, resp. rate 18, height 5' 5.5\" (1.664 m), weight 225 lb (102.1 kg), SpO2 95 %. Subjective  Patient seen and examined, resting in bed comfortably, pain controlled, no new complaints. No change since yesterday. Objective  LLE - Skin intact with no erythema, ecchymosis or lacerations present. Moderate swelling globally in the left knee, moderate knee effusion, compartments soft. CT KNEE LEFT WO CONTRAST    Result Date: 11/26/2022  EXAMINATION: CT OF THE LEFT KNEE WITHOUT CONTRAST 11/26/2022 2:45 pm TECHNIQUE: CT of the left knee was performed without the administration of intravenous contrast.  Multiplanar reformatted images are provided for review. Automated exposure control, iterative reconstruction, and/or weight based adjustment of the mA/kV was utilized to reduce the radiation dose to as low as reasonably achievable.  COMPARISON: None HISTORY ORDERING SYSTEM PROVIDED HISTORY: Left lateral tibial plateau fracture, preop planning TECHNOLOGIST PROVIDED HISTORY: Reason for exam:->Left lateral tibial plateau fracture, preop planning Reason for Exam: Left lateral tibial plateau fracture, preop planning FINDINGS: Bones: Acute traumatic split depression type lateral tibial plateau fracture is present. The articular surface demonstrates depression in the lateral tibial plateau by approximately 9 mm. Vertically oriented component extends into the lateral tibial metaphysis. The fracture extends to the central tibial spine. No fracture extension into the medial tibial plateau. Distal femur, patella and proximal fibula are intact. Soft Tissue: Muscle bulk is maintained. Soft tissue edema from the recent trauma. Joint: Large lipohemarthrosis. No significant degenerative changes. 1. Acute split depression type lateral tibial plateau fracture. Assessment & Plan  Left lateral tibial plateau fracture. I discussed with him today his CT findings. I explained to him that the CT confirms a simple fracture pattern of a split depression fracture of the lateral tibial plateau. I answered all of his questions about the upcoming surgery tomorrow. Continue nonweightbearing left lower extremity. Ice and elevate as needed. Continue knee immobilizer as needed. Pain medication as needed. He will be kept n.p.o. after midnight tonight for planned surgical treatment tomorrow morning.     Mamadou 97, DO  11/27/2022

## 2022-11-27 NOTE — PROGRESS NOTES
V2.0  [facility] [place] Hospitalist Progress Note      Name:  Scott Santoro /Age/Sex: 1970  (47 y.o. male)   MRN & CSN:  [id number] & [id number] Encounter Date/Time: 2022 8:31 AM EST    Location:  [address] [place] [address] PCP: Rubi Steele MD       Hospital Day: 3    Assessment and Plan:   Scott Santoro is a 47 y.o. male with pmh of alcoholic liver cirrhosis, history of variceal bleed in , chronic thrombocytopenia chronic tobacco use disorder who presents with Tibial plateau fracture, left, closed, initial encounter      Plan:  Acute fracture of the lateral tibial plateau: Orthopedic on board plan is for patient to go to the OR. Pending plan finalization pending Ortho. Review underlying history of cirrhosis. On appropriate pain regimen. PT OT to evaluate the patient after the procedure. Alcoholic liver disease with underlying history of TIPS in , history of variceal bleed 2021: Currently on Lasix per lactone and lactulose. Continue to monitor creatinine levels to titrate diuretics. Monitor for ascites. Chronic thrombocytopenia in the setting of cirrhosis: Continue to monitor  History of GI bleed variceal bleed 2021  Chronic tobacco use disorder    Diet ADULT DIET; Regular; Low Sodium (2 gm)   DVT Prophylaxis [] Lovenox, []  Heparin, [] SCDs, [] Ambulation,  [] Eliquis, [] Xarelto  [] Coumadin   Code Status Full Code   Disposition From: Continue home  Expected Disposition: Home  Estimated Date of Discharge: 2 to 3 days  Patient requires continued admission due to needs orthopedic repair of the fracture with PT OT   Surrogate Decision Maker/ POA      Subjective:     Chief Complaint: Knee Pain (L knee injury. Twisted knee after getting pant leg caught in a cage)     The patient was seen at bedside. [place]. Has some pain and discomfort. Ortho on board. Review of Systems:    Review of Systems   Constitutional:  Positive for unexpected weight change. Negative for appetite change, chills and fever. HENT:  Negative for ear pain, hearing loss, sinus pressure, sinus pain and voice change. Eyes:  Negative for pain, discharge and visual disturbance. Respiratory:  Negative for cough, chest tightness and shortness of breath. Cardiovascular:  Negative for chest pain, palpitations and leg swelling. Gastrointestinal:  Positive for abdominal distention, abdominal pain, diarrhea and nausea. Negative for blood in stool and vomiting. Genitourinary:  Negative for dysuria and frequency. Musculoskeletal:  Positive for arthralgias and joint swelling. Negative for back pain. Neurological:  Positive for weakness. Negative for dizziness, light-headedness and headaches. Psychiatric/Behavioral:  Negative for sleep disturbance. Objective: Intake/Output Summary (Last 24 hours) at 11/27/2022 0831  Last data filed at 11/26/2022 1435  Gross per 24 hour   Intake --   Output 1350 ml   Net -1350 ml        Vitals:   Vitals:    11/27/22 0255   BP: (!) 98/55   Pulse: 76   Resp: 18   Temp: 97.9 °F (36.6 °C)   SpO2: 94%       Physical Exam:   Physical Exam  Vitals and nursing note reviewed. Constitutional:       Appearance: Normal appearance. He is normal weight. HENT:      Head: Normocephalic. Right Ear: Tympanic membrane normal.      Left Ear: Tympanic membrane normal.      Nose: Nose normal.      Mouth/Throat:      Mouth: Mucous membranes are moist.   Eyes:      Conjunctiva/sclera: Conjunctivae normal.      Pupils: Pupils are equal, round, and reactive to light. Cardiovascular:      Rate and Rhythm: Normal rate and regular rhythm. Pulses: Normal pulses. Heart sounds: Normal heart sounds. No murmur heard. Pulmonary:      Effort: Pulmonary effort is normal.      Breath sounds: Rales present. No wheezing or rhonchi. Abdominal:      General: Abdomen is flat. Bowel sounds are normal. There is distension. Palpations: Abdomen is soft. Tenderness: There is no abdominal tenderness.  There is no guarding or rebound. Hernia: No hernia is present. Musculoskeletal:         General: No deformity. Normal range of motion. Cervical back: Normal range of motion and neck supple. Right lower leg: No edema. Left lower leg: No edema. Skin:     Coloration: Skin is not jaundiced or pale. Neurological:      General: No focal deficit present. Mental Status: He is alert and oriented to person, place, and time. Mental status is at baseline. Motor: Weakness present.           Medications:   Medications:    sodium chloride flush  5-40 mL IntraVENous 2 times per day    [Held by provider] enoxaparin  30 mg SubCUTAneous BID    furosemide  60 mg Oral Daily    lactulose  30 g Oral BID    spironolactone  150 mg Oral Lunch    multivitamin  1 tablet Oral Daily      Infusions:    sodium chloride       PRN Meds: sodium chloride flush, 5-40 mL, PRN  sodium chloride, , PRN  ondansetron, 4 mg, Q8H PRN   Or  ondansetron, 4 mg, Q6H PRN  polyethylene glycol, 17 g, Daily PRN  acetaminophen, 650 mg, Q6H PRN   Or  acetaminophen, 650 mg, Q6H PRN  albuterol sulfate HFA, 2 puff, Q6H PRN  ibuprofen, 600 mg, Q6H PRN        Labs      Recent Results (from the past 24 hour(s))   CBC with Auto Differential    Collection Time: 11/27/22  4:47 AM   Result Value Ref Range    WBC 6.7 4.0 - 10.5 K/CU MM    RBC 3.83 (L) 4.6 - 6.2 M/CU MM    Hemoglobin 12.7 (L) 13.5 - 18.0 GM/DL    Hematocrit 36.9 (L) 42 - 52 %    MCV 96.3 78 - 100 FL    MCH 33.2 (H) 27 - 31 PG    MCHC 34.4 32.0 - 36.0 %    RDW 13.3 11.7 - 14.9 %    Differential Type AUTOMATED DIFFERENTIAL     Segs Relative 60.1 36 - 66 %    Lymphocytes % 22.4 (L) 24 - 44 %    Monocytes % 9.2 (H) 0 - 4 %    Eosinophils % 7.3 (H) 0 - 3 %    Basophils % 0.6 0 - 1 %    Segs Absolute 4.0 K/CU MM    Lymphocytes Absolute 1.5 K/CU MM    Monocytes Absolute 0.6 K/CU MM    Eosinophils Absolute 0.5 K/CU MM    Basophils Absolute 0.0 K/CU MM    Nucleated RBC % 0.0 %    Total Nucleated RBC 0.0 K/CU MM    Total Immature Neutrophil 0.03 K/CU MM    Immature Neutrophil % 0.4 0 - 0.43 %   Ammonia    Collection Time: 11/27/22  4:47 AM   Result Value Ref Range    Ammonia 91 (H) 16 - 60 UMOL/L   Basic Metabolic Panel w/ Reflex to MG    Collection Time: 11/27/22  4:47 AM   Result Value Ref Range    Sodium 133 (L) 135 - 145 MMOL/L    Potassium 3.6 3.5 - 5.1 MMOL/L    Chloride 97 (L) 99 - 110 mMol/L    CO2 27 21 - 32 MMOL/L    Anion Gap 9 4 - 16    BUN 13 6 - 23 MG/DL    Creatinine 0.6 (L) 0.9 - 1.3 MG/DL    Est, Glom Filt Rate >60 >60 mL/min/1.73m2    Glucose 149 (H) 70 - 99 MG/DL    Calcium 9.1 8.3 - 10.6 MG/DL   Protime-INR    Collection Time: 11/27/22  4:47 AM   Result Value Ref Range    Protime 14.9 (H) 11.7 - 14.5 SECONDS    INR 1.15 INDEX        Imaging/Diagnostics Last 24 Hours   XR KNEE LEFT (3 VIEWS)    Result Date: 11/27/2022  EXAMINATION: THREE X-RAY VIEWS OF THE LEFT KNEE 11/25/2022 11:26 pm COMPARISON: None HISTORY: ORDERING SYSTEM PROVIDED HISTORY:  Fall, left knee pain TECHNOLOGIST PROVIDED HISTORY: Reason for Exam:  Fall, left knee pain Reason for Exam: Twisted knee FINDINGS: Three views of the left knee demonstrate an acute, slightly depressed fracture of the lateral tibial plateau. Findings are associated with of large lipohemarthrosis. Elsewhere, no acute fracture or dislocation. No significant degenerative changes. 1. Acute, slightly depressed fracture of the lateral tibial plateau. 2. Large lipohemarthrosis. CT KNEE LEFT WO CONTRAST    Result Date: 11/26/2022  EXAMINATION: CT OF THE LEFT KNEE WITHOUT CONTRAST 11/26/2022 2:45 pm TECHNIQUE: CT of the left knee was performed without the administration of intravenous contrast.  Multiplanar reformatted images are provided for review. Automated exposure control, iterative reconstruction, and/or weight based adjustment of the mA/kV was utilized to reduce the radiation dose to as low as reasonably achievable.  COMPARISON: None HISTORY ORDERING SYSTEM PROVIDED HISTORY: Left lateral tibial plateau fracture, preop planning TECHNOLOGIST PROVIDED HISTORY: Reason for exam:->Left lateral tibial plateau fracture, preop planning Reason for Exam: Left lateral tibial plateau fracture, preop planning FINDINGS: Bones: Acute traumatic split depression type lateral tibial plateau fracture is present. The articular surface demonstrates depression in the lateral tibial plateau by approximately 9 mm. Vertically oriented component extends into the lateral tibial metaphysis. The fracture extends to the central tibial spine. No fracture extension into the medial tibial plateau. Distal femur, patella and proximal fibula are intact. Soft Tissue: Muscle bulk is maintained. Soft tissue edema from the recent trauma. Joint: Large lipohemarthrosis. No significant degenerative changes. 1. Acute split depression type lateral tibial plateau fracture.        Electronically signed by Cyndi Johnson MD, MD on 11/27/2022 at 8:31 AM

## 2022-11-28 ENCOUNTER — ANESTHESIA (OUTPATIENT)
Dept: OPERATING ROOM | Age: 52
DRG: 313 | End: 2022-11-28
Payer: COMMERCIAL

## 2022-11-28 ENCOUNTER — APPOINTMENT (OUTPATIENT)
Dept: GENERAL RADIOLOGY | Age: 52
DRG: 313 | End: 2022-11-28
Payer: COMMERCIAL

## 2022-11-28 ENCOUNTER — ANESTHESIA EVENT (OUTPATIENT)
Dept: OPERATING ROOM | Age: 52
DRG: 313 | End: 2022-11-28
Payer: COMMERCIAL

## 2022-11-28 PROCEDURE — 2580000003 HC RX 258: Performed by: ORTHOPAEDIC SURGERY

## 2022-11-28 PROCEDURE — 6360000002 HC RX W HCPCS: Performed by: ANESTHESIOLOGY

## 2022-11-28 PROCEDURE — 2580000003 HC RX 258: Performed by: NURSE ANESTHETIST, CERTIFIED REGISTERED

## 2022-11-28 PROCEDURE — 6370000000 HC RX 637 (ALT 250 FOR IP): Performed by: ORTHOPAEDIC SURGERY

## 2022-11-28 PROCEDURE — 6370000000 HC RX 637 (ALT 250 FOR IP): Performed by: NURSE PRACTITIONER

## 2022-11-28 PROCEDURE — C1713 ANCHOR/SCREW BN/BN,TIS/BN: HCPCS | Performed by: ORTHOPAEDIC SURGERY

## 2022-11-28 PROCEDURE — 6360000002 HC RX W HCPCS: Performed by: NURSE ANESTHETIST, CERTIFIED REGISTERED

## 2022-11-28 PROCEDURE — 3600000004 HC SURGERY LEVEL 4 BASE: Performed by: ORTHOPAEDIC SURGERY

## 2022-11-28 PROCEDURE — 3600000014 HC SURGERY LEVEL 4 ADDTL 15MIN: Performed by: ORTHOPAEDIC SURGERY

## 2022-11-28 PROCEDURE — 27535 TREAT KNEE FRACTURE: CPT | Performed by: ORTHOPAEDIC SURGERY

## 2022-11-28 PROCEDURE — 2780000010 HC IMPLANT OTHER: Performed by: ORTHOPAEDIC SURGERY

## 2022-11-28 PROCEDURE — 1200000000 HC SEMI PRIVATE

## 2022-11-28 PROCEDURE — 3700000000 HC ANESTHESIA ATTENDED CARE: Performed by: ORTHOPAEDIC SURGERY

## 2022-11-28 PROCEDURE — 2500000003 HC RX 250 WO HCPCS: Performed by: NURSE ANESTHETIST, CERTIFIED REGISTERED

## 2022-11-28 PROCEDURE — 7100000001 HC PACU RECOVERY - ADDTL 15 MIN: Performed by: ORTHOPAEDIC SURGERY

## 2022-11-28 PROCEDURE — 3700000001 HC ADD 15 MINUTES (ANESTHESIA): Performed by: ORTHOPAEDIC SURGERY

## 2022-11-28 PROCEDURE — 6360000002 HC RX W HCPCS: Performed by: ORTHOPAEDIC SURGERY

## 2022-11-28 PROCEDURE — 2709999900 HC NON-CHARGEABLE SUPPLY: Performed by: ORTHOPAEDIC SURGERY

## 2022-11-28 PROCEDURE — 76000 FLUOROSCOPY <1 HR PHYS/QHP: CPT

## 2022-11-28 PROCEDURE — G0378 HOSPITAL OBSERVATION PER HR: HCPCS

## 2022-11-28 PROCEDURE — 0QSH04Z REPOSITION LEFT TIBIA WITH INTERNAL FIXATION DEVICE, OPEN APPROACH: ICD-10-PCS | Performed by: ORTHOPAEDIC SURGERY

## 2022-11-28 PROCEDURE — 7100000000 HC PACU RECOVERY - FIRST 15 MIN: Performed by: ORTHOPAEDIC SURGERY

## 2022-11-28 DEVICE — LOQTEQ® PROXIMAL LAT. TIBIA PLATE 3.5, 5 HOLES, L 95, L
Type: IMPLANTABLE DEVICE | Site: TIBIA | Status: FUNCTIONAL
Brand: LOQTEQ®

## 2022-11-28 DEVICE — LOQTEQ® CORTICAL SCREW 3.5, SMALL HEAD, T15, SELF-TAPP. L 70
Type: IMPLANTABLE DEVICE | Site: TIBIA | Status: FUNCTIONAL
Brand: LOQTEQ®

## 2022-11-28 DEVICE — LOQTEQ® CORTICAL SCREW 3.5, SMALL HEAD, T15, SELF-TAPP. L 65
Type: IMPLANTABLE DEVICE | Site: TIBIA | Status: FUNCTIONAL
Brand: LOQTEQ®

## 2022-11-28 DEVICE — CORTICAL SCREW 3.5, T15, SELF-TAPP. L 36: Type: IMPLANTABLE DEVICE | Site: TIBIA | Status: FUNCTIONAL

## 2022-11-28 DEVICE — IMPLANTABLE DEVICE: Type: IMPLANTABLE DEVICE | Site: TIBIA | Status: FUNCTIONAL

## 2022-11-28 DEVICE — LOQTEQ® CORTICAL SCREW 3.5, T15, SELF-TAPPING, L 32
Type: IMPLANTABLE DEVICE | Site: TIBIA | Status: FUNCTIONAL
Brand: LOQTEQ®

## 2022-11-28 DEVICE — LOQTEQ® CORTICAL SCREW 3.5, SMALL HEAD, T15, SELF-TAPP. L 75
Type: IMPLANTABLE DEVICE | Site: TIBIA | Status: FUNCTIONAL
Brand: LOQTEQ®

## 2022-11-28 RX ORDER — OXYCODONE HYDROCHLORIDE 10 MG/1
10 TABLET ORAL EVERY 4 HOURS PRN
Status: DISCONTINUED | OUTPATIENT
Start: 2022-11-28 | End: 2022-11-29 | Stop reason: HOSPADM

## 2022-11-28 RX ORDER — IPRATROPIUM BROMIDE AND ALBUTEROL SULFATE 2.5; .5 MG/3ML; MG/3ML
1 SOLUTION RESPIRATORY (INHALATION)
Status: DISCONTINUED | OUTPATIENT
Start: 2022-11-28 | End: 2022-11-28 | Stop reason: HOSPADM

## 2022-11-28 RX ORDER — SODIUM CHLORIDE 0.9 % (FLUSH) 0.9 %
5-40 SYRINGE (ML) INJECTION PRN
Status: DISCONTINUED | OUTPATIENT
Start: 2022-11-28 | End: 2022-11-28 | Stop reason: HOSPADM

## 2022-11-28 RX ORDER — SODIUM CHLORIDE 0.9 % (FLUSH) 0.9 %
5-40 SYRINGE (ML) INJECTION EVERY 12 HOURS SCHEDULED
Status: DISCONTINUED | OUTPATIENT
Start: 2022-11-28 | End: 2022-11-28 | Stop reason: HOSPADM

## 2022-11-28 RX ORDER — PROPOFOL 10 MG/ML
INJECTION, EMULSION INTRAVENOUS PRN
Status: DISCONTINUED | OUTPATIENT
Start: 2022-11-28 | End: 2022-11-28 | Stop reason: SDUPTHER

## 2022-11-28 RX ORDER — LIDOCAINE HYDROCHLORIDE 20 MG/ML
INJECTION, SOLUTION INTRAVENOUS PRN
Status: DISCONTINUED | OUTPATIENT
Start: 2022-11-28 | End: 2022-11-28 | Stop reason: SDUPTHER

## 2022-11-28 RX ORDER — OXYCODONE HYDROCHLORIDE 5 MG/1
10 TABLET ORAL PRN
Status: DISCONTINUED | OUTPATIENT
Start: 2022-11-28 | End: 2022-11-28 | Stop reason: HOSPADM

## 2022-11-28 RX ORDER — DIPHENHYDRAMINE HYDROCHLORIDE 50 MG/ML
12.5 INJECTION INTRAMUSCULAR; INTRAVENOUS ONCE
Status: COMPLETED | OUTPATIENT
Start: 2022-11-28 | End: 2022-11-28

## 2022-11-28 RX ORDER — KETOROLAC TROMETHAMINE 30 MG/ML
30 INJECTION, SOLUTION INTRAMUSCULAR; INTRAVENOUS EVERY 6 HOURS
Status: DISCONTINUED | OUTPATIENT
Start: 2022-11-28 | End: 2022-11-29 | Stop reason: HOSPADM

## 2022-11-28 RX ORDER — ONDANSETRON 2 MG/ML
4 INJECTION INTRAMUSCULAR; INTRAVENOUS EVERY 6 HOURS PRN
Status: DISCONTINUED | OUTPATIENT
Start: 2022-11-28 | End: 2022-11-29 | Stop reason: HOSPADM

## 2022-11-28 RX ORDER — SODIUM CHLORIDE 0.9 % (FLUSH) 0.9 %
5-40 SYRINGE (ML) INJECTION PRN
Status: DISCONTINUED | OUTPATIENT
Start: 2022-11-28 | End: 2022-11-29 | Stop reason: HOSPADM

## 2022-11-28 RX ORDER — FENTANYL CITRATE 50 UG/ML
INJECTION, SOLUTION INTRAMUSCULAR; INTRAVENOUS PRN
Status: DISCONTINUED | OUTPATIENT
Start: 2022-11-28 | End: 2022-11-28 | Stop reason: SDUPTHER

## 2022-11-28 RX ORDER — SODIUM CHLORIDE 9 MG/ML
25 INJECTION, SOLUTION INTRAVENOUS PRN
Status: DISCONTINUED | OUTPATIENT
Start: 2022-11-28 | End: 2022-11-28 | Stop reason: HOSPADM

## 2022-11-28 RX ORDER — ONDANSETRON 4 MG/1
4 TABLET, ORALLY DISINTEGRATING ORAL EVERY 8 HOURS PRN
Status: DISCONTINUED | OUTPATIENT
Start: 2022-11-28 | End: 2022-11-29 | Stop reason: HOSPADM

## 2022-11-28 RX ORDER — SODIUM CHLORIDE 0.9 % (FLUSH) 0.9 %
5-40 SYRINGE (ML) INJECTION EVERY 12 HOURS SCHEDULED
Status: DISCONTINUED | OUTPATIENT
Start: 2022-11-28 | End: 2022-11-29 | Stop reason: HOSPADM

## 2022-11-28 RX ORDER — DROPERIDOL 2.5 MG/ML
0.62 INJECTION, SOLUTION INTRAMUSCULAR; INTRAVENOUS
Status: DISCONTINUED | OUTPATIENT
Start: 2022-11-28 | End: 2022-11-28 | Stop reason: HOSPADM

## 2022-11-28 RX ORDER — ROCURONIUM BROMIDE 10 MG/ML
INJECTION, SOLUTION INTRAVENOUS PRN
Status: DISCONTINUED | OUTPATIENT
Start: 2022-11-28 | End: 2022-11-28 | Stop reason: SDUPTHER

## 2022-11-28 RX ORDER — ACETAMINOPHEN 500 MG
1000 TABLET ORAL ONCE
Status: DISCONTINUED | OUTPATIENT
Start: 2022-11-28 | End: 2022-11-28 | Stop reason: HOSPADM

## 2022-11-28 RX ORDER — HYDRALAZINE HYDROCHLORIDE 20 MG/ML
10 INJECTION INTRAMUSCULAR; INTRAVENOUS
Status: DISCONTINUED | OUTPATIENT
Start: 2022-11-28 | End: 2022-11-28 | Stop reason: HOSPADM

## 2022-11-28 RX ORDER — SODIUM CHLORIDE 9 MG/ML
INJECTION, SOLUTION INTRAVENOUS PRN
Status: DISCONTINUED | OUTPATIENT
Start: 2022-11-28 | End: 2022-11-29 | Stop reason: HOSPADM

## 2022-11-28 RX ORDER — ONDANSETRON 2 MG/ML
4 INJECTION INTRAMUSCULAR; INTRAVENOUS
Status: DISCONTINUED | OUTPATIENT
Start: 2022-11-28 | End: 2022-11-28 | Stop reason: HOSPADM

## 2022-11-28 RX ORDER — SODIUM CHLORIDE, SODIUM LACTATE, POTASSIUM CHLORIDE, CALCIUM CHLORIDE 600; 310; 30; 20 MG/100ML; MG/100ML; MG/100ML; MG/100ML
INJECTION, SOLUTION INTRAVENOUS CONTINUOUS
Status: DISCONTINUED | OUTPATIENT
Start: 2022-11-28 | End: 2022-11-28 | Stop reason: HOSPADM

## 2022-11-28 RX ORDER — OXYCODONE HYDROCHLORIDE 5 MG/1
5 TABLET ORAL EVERY 4 HOURS PRN
Status: DISCONTINUED | OUTPATIENT
Start: 2022-11-28 | End: 2022-11-29 | Stop reason: HOSPADM

## 2022-11-28 RX ORDER — ONDANSETRON 2 MG/ML
INJECTION INTRAMUSCULAR; INTRAVENOUS PRN
Status: DISCONTINUED | OUTPATIENT
Start: 2022-11-28 | End: 2022-11-28 | Stop reason: SDUPTHER

## 2022-11-28 RX ORDER — LABETALOL HYDROCHLORIDE 5 MG/ML
10 INJECTION, SOLUTION INTRAVENOUS
Status: DISCONTINUED | OUTPATIENT
Start: 2022-11-28 | End: 2022-11-28 | Stop reason: HOSPADM

## 2022-11-28 RX ORDER — SODIUM CHLORIDE, SODIUM LACTATE, POTASSIUM CHLORIDE, CALCIUM CHLORIDE 600; 310; 30; 20 MG/100ML; MG/100ML; MG/100ML; MG/100ML
INJECTION, SOLUTION INTRAVENOUS CONTINUOUS
Status: DISCONTINUED | OUTPATIENT
Start: 2022-11-28 | End: 2022-11-29

## 2022-11-28 RX ORDER — OXYCODONE HYDROCHLORIDE 5 MG/1
5 TABLET ORAL PRN
Status: DISCONTINUED | OUTPATIENT
Start: 2022-11-28 | End: 2022-11-28 | Stop reason: HOSPADM

## 2022-11-28 RX ORDER — FENTANYL CITRATE 50 UG/ML
25 INJECTION, SOLUTION INTRAMUSCULAR; INTRAVENOUS EVERY 5 MIN PRN
Status: COMPLETED | OUTPATIENT
Start: 2022-11-28 | End: 2022-11-28

## 2022-11-28 RX ORDER — SODIUM CHLORIDE 9 MG/ML
INJECTION, SOLUTION INTRAVENOUS PRN
Status: DISCONTINUED | OUTPATIENT
Start: 2022-11-28 | End: 2022-11-28 | Stop reason: HOSPADM

## 2022-11-28 RX ADMIN — SODIUM CHLORIDE, POTASSIUM CHLORIDE, SODIUM LACTATE AND CALCIUM CHLORIDE: 600; 310; 30; 20 INJECTION, SOLUTION INTRAVENOUS at 22:23

## 2022-11-28 RX ADMIN — ONDANSETRON 4 MG: 2 INJECTION INTRAMUSCULAR; INTRAVENOUS at 09:52

## 2022-11-28 RX ADMIN — FENTANYL CITRATE 25 MCG: 50 INJECTION INTRAMUSCULAR; INTRAVENOUS at 10:39

## 2022-11-28 RX ADMIN — DIPHENHYDRAMINE HYDROCHLORIDE 12.5 MG: 50 INJECTION, SOLUTION INTRAMUSCULAR; INTRAVENOUS at 11:18

## 2022-11-28 RX ADMIN — KETOROLAC TROMETHAMINE 30 MG: 30 INJECTION, SOLUTION INTRAMUSCULAR; INTRAVENOUS at 14:20

## 2022-11-28 RX ADMIN — DEXMEDETOMIDINE 8 MCG: 100 INJECTION, SOLUTION, CONCENTRATE INTRAVENOUS at 09:03

## 2022-11-28 RX ADMIN — ROCURONIUM BROMIDE 50 MG: 10 INJECTION INTRAVENOUS at 08:41

## 2022-11-28 RX ADMIN — Medication 10 ML: at 20:30

## 2022-11-28 RX ADMIN — IBUPROFEN 600 MG: 600 TABLET ORAL at 02:43

## 2022-11-28 RX ADMIN — SPIRONOLACTONE 150 MG: 50 TABLET ORAL at 12:03

## 2022-11-28 RX ADMIN — FENTANYL CITRATE 25 MCG: 50 INJECTION INTRAMUSCULAR; INTRAVENOUS at 10:24

## 2022-11-28 RX ADMIN — KETOROLAC TROMETHAMINE 30 MG: 30 INJECTION, SOLUTION INTRAMUSCULAR; INTRAVENOUS at 20:30

## 2022-11-28 RX ADMIN — CEFAZOLIN 2000 MG: 2 INJECTION, POWDER, FOR SOLUTION INTRAMUSCULAR; INTRAVENOUS at 17:03

## 2022-11-28 RX ADMIN — LACTULOSE 30 G: 10 SOLUTION ORAL at 20:31

## 2022-11-28 RX ADMIN — DEXMEDETOMIDINE 12 MCG: 100 INJECTION, SOLUTION, CONCENTRATE INTRAVENOUS at 08:38

## 2022-11-28 RX ADMIN — FENTANYL CITRATE 50 MCG: 50 INJECTION, SOLUTION INTRAMUSCULAR; INTRAVENOUS at 08:42

## 2022-11-28 RX ADMIN — SUGAMMADEX 100 MG: 100 INJECTION, SOLUTION INTRAVENOUS at 10:00

## 2022-11-28 RX ADMIN — SODIUM CHLORIDE, POTASSIUM CHLORIDE, SODIUM LACTATE AND CALCIUM CHLORIDE: 600; 310; 30; 20 INJECTION, SOLUTION INTRAVENOUS at 08:38

## 2022-11-28 RX ADMIN — CEFAZOLIN 2000 MG: 2 INJECTION, POWDER, FOR SOLUTION INTRAMUSCULAR; INTRAVENOUS at 08:41

## 2022-11-28 RX ADMIN — PROPOFOL 150 MG: 10 INJECTION, EMULSION INTRAVENOUS at 08:41

## 2022-11-28 RX ADMIN — LACTULOSE 30 G: 10 SOLUTION ORAL at 12:02

## 2022-11-28 RX ADMIN — FENTANYL CITRATE 25 MCG: 50 INJECTION INTRAMUSCULAR; INTRAVENOUS at 10:29

## 2022-11-28 RX ADMIN — OXYCODONE HYDROCHLORIDE 10 MG: 10 TABLET ORAL at 17:02

## 2022-11-28 RX ADMIN — SUGAMMADEX 100 MG: 100 INJECTION, SOLUTION INTRAVENOUS at 10:01

## 2022-11-28 RX ADMIN — FENTANYL CITRATE 25 MCG: 50 INJECTION INTRAMUSCULAR; INTRAVENOUS at 10:34

## 2022-11-28 RX ADMIN — SODIUM CHLORIDE, POTASSIUM CHLORIDE, SODIUM LACTATE AND CALCIUM CHLORIDE 1000 ML: 600; 310; 30; 20 INJECTION, SOLUTION INTRAVENOUS at 12:57

## 2022-11-28 RX ADMIN — LIDOCAINE HYDROCHLORIDE 50 MG: 20 INJECTION, SOLUTION INTRAVENOUS at 08:41

## 2022-11-28 RX ADMIN — HYDROMORPHONE HYDROCHLORIDE 0.5 MG: 1 INJECTION, SOLUTION INTRAMUSCULAR; INTRAVENOUS; SUBCUTANEOUS at 10:50

## 2022-11-28 RX ADMIN — FUROSEMIDE 60 MG: 40 TABLET ORAL at 12:03

## 2022-11-28 RX ADMIN — FENTANYL CITRATE 50 MCG: 50 INJECTION, SOLUTION INTRAMUSCULAR; INTRAVENOUS at 08:41

## 2022-11-28 RX ADMIN — DEXMEDETOMIDINE 12 MCG: 100 INJECTION, SOLUTION, CONCENTRATE INTRAVENOUS at 08:45

## 2022-11-28 RX ADMIN — SODIUM CHLORIDE, PRESERVATIVE FREE 10 ML: 5 INJECTION INTRAVENOUS at 20:31

## 2022-11-28 RX ADMIN — HYDROMORPHONE HYDROCHLORIDE 1 MG: 1 INJECTION, SOLUTION INTRAMUSCULAR; INTRAVENOUS; SUBCUTANEOUS at 09:03

## 2022-11-28 ASSESSMENT — PAIN SCALES - GENERAL
PAINLEVEL_OUTOF10: 4
PAINLEVEL_OUTOF10: 5
PAINLEVEL_OUTOF10: 7
PAINLEVEL_OUTOF10: 6
PAINLEVEL_OUTOF10: 6
PAINLEVEL_OUTOF10: 7
PAINLEVEL_OUTOF10: 0
PAINLEVEL_OUTOF10: 3
PAINLEVEL_OUTOF10: 7
PAINLEVEL_OUTOF10: 7
PAINLEVEL_OUTOF10: 6
PAINLEVEL_OUTOF10: 4
PAINLEVEL_OUTOF10: 7

## 2022-11-28 ASSESSMENT — PAIN DESCRIPTION - LOCATION
LOCATION: KNEE;LEG
LOCATION: LEG
LOCATION: KNEE;LEG
LOCATION: KNEE
LOCATION: LEG
LOCATION: KNEE;LEG

## 2022-11-28 ASSESSMENT — PAIN DESCRIPTION - ONSET
ONSET: ON-GOING
ONSET: GRADUAL
ONSET: ON-GOING

## 2022-11-28 ASSESSMENT — PAIN DESCRIPTION - FREQUENCY
FREQUENCY: CONTINUOUS
FREQUENCY: INTERMITTENT
FREQUENCY: CONTINUOUS

## 2022-11-28 ASSESSMENT — PAIN - FUNCTIONAL ASSESSMENT
PAIN_FUNCTIONAL_ASSESSMENT: PREVENTS OR INTERFERES WITH MANY ACTIVE NOT PASSIVE ACTIVITIES
PAIN_FUNCTIONAL_ASSESSMENT: PREVENTS OR INTERFERES WITH MANY ACTIVE NOT PASSIVE ACTIVITIES
PAIN_FUNCTIONAL_ASSESSMENT: 0-10
PAIN_FUNCTIONAL_ASSESSMENT: PREVENTS OR INTERFERES SOME ACTIVE ACTIVITIES AND ADLS
PAIN_FUNCTIONAL_ASSESSMENT: PREVENTS OR INTERFERES WITH MANY ACTIVE NOT PASSIVE ACTIVITIES
PAIN_FUNCTIONAL_ASSESSMENT: PREVENTS OR INTERFERES WITH MANY ACTIVE NOT PASSIVE ACTIVITIES
PAIN_FUNCTIONAL_ASSESSMENT: PREVENTS OR INTERFERES SOME ACTIVE ACTIVITIES AND ADLS
PAIN_FUNCTIONAL_ASSESSMENT: PREVENTS OR INTERFERES WITH MANY ACTIVE NOT PASSIVE ACTIVITIES

## 2022-11-28 ASSESSMENT — PAIN DESCRIPTION - DESCRIPTORS
DESCRIPTORS: ACHING;DISCOMFORT
DESCRIPTORS: THROBBING
DESCRIPTORS: THROBBING
DESCRIPTORS: ACHING
DESCRIPTORS: THROBBING

## 2022-11-28 ASSESSMENT — PAIN DESCRIPTION - ORIENTATION
ORIENTATION: LEFT

## 2022-11-28 ASSESSMENT — ENCOUNTER SYMPTOMS
EYE DISCHARGE: 0
VOICE CHANGE: 0
EYE PAIN: 0
VOMITING: 0
SINUS PAIN: 0
ABDOMINAL PAIN: 1
CHEST TIGHTNESS: 0
COUGH: 0
ABDOMINAL DISTENTION: 1
SHORTNESS OF BREATH: 1
NAUSEA: 1
BLOOD IN STOOL: 0
SINUS PRESSURE: 0
SHORTNESS OF BREATH: 0
BACK PAIN: 0
DIARRHEA: 1

## 2022-11-28 ASSESSMENT — PAIN DESCRIPTION - PAIN TYPE
TYPE: SURGICAL PAIN

## 2022-11-28 ASSESSMENT — LIFESTYLE VARIABLES: SMOKING_STATUS: 1

## 2022-11-28 ASSESSMENT — PAIN SCALES - WONG BAKER: WONGBAKER_NUMERICALRESPONSE: 0

## 2022-11-28 NOTE — OP NOTE
DATE OF PROCEDURE:  11/28/2022     PREOPERATIVE DIAGNOSES:  1. Left lateral depression tibial plateau fracture. POSTOPERATIVE DIAGNOSES:  1. Left lateral depression tibial plateau fracture. PROCEDURES PERFORMED:  1. Open reduction internal fixation of left lateral tibial plateau  fracture using AAP 5-hole proximal tibial locking plate with 15 mL of  cancellous bone graft. 2.  Fluoroscopic guidance interpretation. ATTENDING SURGEON:  Asha Soares DO     ANESTHESIA:  General.     ESTIMATED BLOOD LOSS:  50 mL. TOTAL TOURNIQUET TIME:  55 minutes. FLUIDS:  600 mL of crystalloids. INDICATION FOR PROCEDURE:  The patient is a 21-year-old male who  sustained an injury to his left knee. He was  immediately unable to bear weight on the left leg and was evaluated in the ED. X-rays were obtained and he was diagnosed with a left lateral tibial plateau fracture. He was subsequently admitted to the hospital for treatment of his injury. Evaluation of his x-rays and CT scan revealed a split depression fracture of his left lateral tibial plateau. Given the fracture pattern on his tibial plateau, I  recommended surgical treatment. I explained the risks, benefits, possible complications of the procedures to the patient and after answering all of his questions he consented to  undergo the above procedure. REPORT OF PROCEDURE:  The patient was seen and evaluated in the  preoperative holding area where the left lower extremity was signed in  his presence. At this point of care, the patient was turned over to the  Anesthesia Team who transported him back to the operative suite. He was  placed supine on the operating table with a bump under the left hip as  well as bone foam under the left leg. General anesthesia was applied  and once adequate anesthesia was obtained, the left lower extremity was  prepped and draped in the usual sterile fashion. Preoperative  antibiotics were administered. At this point, a time-out was performed  and all in attendance were in agreement. I exsanguinated the left lower extremity with the use of Esmarch and  tourniquet was inflated to 250 mmHg. I made a lazy S-shaped incision overlying the lateral aspect of the left knee  beginning just above the joint line and extending just past Gerdy's  tubercle. I then made incision in this location and carried sharp  dissection down to the level of the IT band. I then incised obliquely  through the IT band and carried dissection down onto the lateral aspect  of the tibia and elevated the anterior compartment musculature off of  the proximal tibia. I then palpated the capsule and made a sub meniscal  incision through the capsule with the use of a scalpel connecting this  to the IT band incision. At this point, I did encounter significant  fracture hematoma which was then evacuated. I then continued soft  tissue dissection exposing the lateral wall of the proximal tibia. I  then used a Ann elevator to elevate submuscularly along the lateral  tibial shaft. I then placed a Weitlaner retractor for further  visualization. I then used a #2 FiberWire suture and passed this through the lateral  meniscus exposing the joint surface. I found that the anterior aspect  of the lateral tibial plateau was significantly depressed and angulated. I then used an osteotome to elevate the lateral wall fracture fragment off  of the tibial plateau. I then used a lamina  to elevate the  lateral wall fracture for further visualization of the depression of the  articular surface of the lateral tibial plateau. With visualization of  the fracture, I used a curved bone tamp to mallet the depressed fragment  back in line with the distal femur into near anatomic alignment. While  holding the reduction in place, I confirmed adequate reduction under  fluoroscopy in the AP and lateral planes.      Once satisfactory reduction was obtained, I then placed approximately 15  mL of cancellous bone chips into the bone void in the lateral tibial  plateau. I then closed down the lateral wall fracture and held this in  position. Holding the fracture reduced, I confirmed maintenance of the  articular surface reduction under fluoroscopy in the AP and lateral  planes. I then advanced a guidewire from lateral to medial direction to maintain  the reduction of the lateral wall fracture. With satisfactory reduction obtained, I then selected an AAP 5-hole  proximal tibial locking plate which was positioned over the lateral wall  of the proximal tibia. I confirmed position of the implant under  fluoroscopy. I first drilled in place a cortical screw through one of  the proximal holes in the plate compressing the lateral wall up against  the medial tibia. I then drilled in place 3 additional locking  Screws to complete the rafting screw fixation. I then drilled and  placed one cortical screw into the tibial shaft compressing the plate up  against the tibial shaft facilitating reduction. With this initial  fixation in place, I confirmed near anatomic alignment as well as  position of all hardware in the AP and lateral planes under fluoroscopy. I then drilled and placed one additional locking screw into the tibial  shaft followed by a locking kickstand screw in standard fashion. With  all fixation placed, I confirmed near anatomic alignment as well as  position of all hardware in the AP and lateral planes under fluoroscopy. The wounds were then irrigated with sterile normal saline. I  reapproximated the IT band incision using 0 Vicryl suture in  figure-of-eight fashion. I then closed subcutaneous tissue using 2-0  Vicryl suture followed by skin closure with strata fix and Prineo . Tourniquet was  then deflated for a total of 55 minutes and adequate hemostasis was  maintained at all times.   I then applied a sterile soft dressing to the  left knee. The patient was then awakened from anesthesia  and transported to PACU in stable condition. He appeared to have  tolerated the procedure well. PROGNOSIS:  At this point, he will be readmitted to the hospital for observation for  postoperative pain control, rehabilitation, medical monitoring. Hospitalist will be consulted for medical management and physical therapy  will be consulted for gait training and he is to remain strictly  nonweightbearing on the left leg, but can have immediate range of motion  for the left knee. He will receive antibiotic  prophylaxis and DVT prophylaxis during his hospitalization. Upon his  discharge, I will see him back in the office in 2 weeks for staple  removal and will continue to monitor his progress in the outpatient  setting for radiographic evidence of healing and resolution of his  symptoms.            Mamadou 97, DO

## 2022-11-28 NOTE — ANESTHESIA PRE PROCEDURE
Department of Anesthesiology  Preprocedure Note       Name:  Albina Brown   Age:  47 y.o.  :  1970                                          MRN:  [id number]         Date:  2022      Surgeon: Saman Jay):  Jun Casas DO    Procedure: Procedure(s):  LEFT TIBIA OPEN REDUCTION INTERNAL FIXATION    Medications prior to admission:   Prior to Admission medications    Medication Sig Start Date End Date Taking?  Authorizing Provider   albuterol sulfate HFA (PROVENTIL;VENTOLIN;PROAIR) 108 (90 Base) MCG/ACT inhaler inhale 2 puff by inhalation route  every 4 - 6 hours as needed 21  Yes Historical Provider, MD   spironolactone (ALDACTONE) 100 MG tablet  10/29/22   Historical Provider, MD   lactulose ([address] [employer]) 10 GM/15ML solution  22   Historical Provider, MD   furosemide (LASIX) 40 MG tablet  10/29/22   Historical Provider, MD       Current medications:    Current Facility-Administered Medications   Medication Dose Route Frequency Provider Last Rate Last Admin    acetaminophen (TYLENOL) tablet 1,000 mg  1,000 mg Oral Once Jun Casas, DO        lactated ringers infusion   IntraVENous Continuous Jun Cassa DO        sodium chloride flush 0.9 % injection 5-40 mL  5-40 mL IntraVENous 2 times per day Jun Casas DO        sodium chloride flush 0.9 % injection 5-40 mL  5-40 mL IntraVENous PRN Jun Casas DO        0.9 % sodium chloride infusion   IntraVENous PRN Jun Casas DO        ceFAZolin (ANCEF) 2,000 mg in dextrose 5 % 50 mL IVPB (mini-bag)  2,000 mg IntraVENous On Call to Brant Naidu 10, DO        bisacodyl (DULCOLAX) EC tablet 10 mg  10 mg Oral Daily PRN RADHA Rich - CNP   10 mg at 22    sodium chloride flush 0.9 % injection 5-40 mL  5-40 mL IntraVENous 2 times per day Yadira Dhillon MD   10 mL at 22 194    sodium chloride flush 0.9 % injection 5-40 mL  5-40 mL IntraVENous PRN Yadira Dhillon MD        0.9 % sodium chloride infusion   IntraVENous PRN Abhi Schneider MD        [Held by provider] enoxaparin Sodium (LOVENOX) injection 30 mg  30 mg SubCUTAneous BID Abhi Schneider MD        ondansetron (ZOFRAN-ODT) disintegrating tablet 4 mg  4 mg Oral Q8H PRN Abhi Schneider MD        Or    ondansetron (ZOFRAN) injection 4 mg  4 mg IntraVENous Q6H PRN Abhi Schneider MD        polyethylene glycol (GLYCOLAX) packet 17 g  17 g Oral Daily PRN Abhi Schneider MD   17 g at 11/26/22 2024    acetaminophen (TYLENOL) tablet 650 mg  650 mg Oral Q6H PRN Abhi Schneider MD   650 mg at 11/26/22 2023    Or    acetaminophen (TYLENOL) suppository 650 mg  650 mg Rectal Q6H PRN Abhi Schneider MD        albuterol sulfate HFA (PROVENTIL;VENTOLIN;PROAIR) 108 (90 Base) MCG/ACT inhaler 2 puff  2 puff Inhalation Q6H PRN Abhi Schneider MD        furosemide (LASIX) tablet 60 mg  60 mg Oral Daily Abhi Schneider MD   60 mg at 11/27/22 0851    lactulose (CHRONULAC) 10 GM/15ML solution 30 g  30 g Oral BID Abhi Schneider MD   30 g at 11/27/22 1947    spironolactone (ALDACTONE) tablet 150 mg  150 mg Oral Lunch Abhi Schneider MD   150 mg at 11/27/22 1142    therapeutic multivitamin-minerals 1 tablet  1 tablet Oral Daily Clementina Goldman MD   1 tablet at 11/27/22 0851    ibuprofen (ADVIL;MOTRIN) tablet 600 mg  600 mg Oral Q6H PRN RADHA Rodriguez - CNP   600 mg at 11/28/22 0243       Allergies:     Allergies   Allergen Reactions    Prednisone Swelling       Problem List:    Patient Active Problem List   Diagnosis Code    Tibial plateau fracture, left, closed, initial encounter S82.142A       Past Medical History:        Diagnosis Date    Alcoholic cirrhosis of liver ([facility] [address]     Esophageal varices (HCC)     Neuropathy        Past Surgical History:        Procedure Laterality Date    APPENDECTOMY      HERNIA REPAIR      LIVER TRANSPLANT      liver stent       Social History:    Social History     Tobacco Use    Smoking status: Every Day     Packs/day: 0.50     Types: Cigarettes     Passive exposure: Current    Smokeless tobacco: Never   Substance Use Topics    Alcohol use: Not Currently                                Ready to quit: Not Answered  Counseling given: Not Answered      Vital Signs (Current):   Vitals:    11/27/22 2152 11/28/22 0302 11/28/22 0305 11/28/22 0704   BP: 113/69 109/70 109/70 110/67   Pulse: 85 78 77 77   Resp: 18 18 18 18   Temp: 36.7 °C (98.1 °F) 36.6 °C (97.8 °F) 36.6 °C (97.9 °F) 36.4 °C (97.5 °F)   TempSrc: Oral Oral Oral Temporal   SpO2: 97%  97% 98%   Weight:       Height:                                                  BP Readings from Last 3 Encounters:   11/28/22 110/67       NPO Status: Time of last liquid consumption: 2300                        Time of last solid consumption: 1800                        Date of last liquid consumption: 11/27/22                        Date of last solid food consumption: 11/27/22    BMI:   Wt Readings from Last 3 Encounters:   11/25/22 225 lb (102.1 kg)     Body mass index is 36.87 kg/m². CBC:   Lab Results   Component Value Date/Time    WBC 6.7 11/27/2022 04:47 AM    RBC 3.83 11/27/2022 04:47 AM    HGB 12.7 11/27/2022 04:47 AM    HCT 36.9 11/27/2022 04:47 AM    MCV 96.3 11/27/2022 04:47 AM    RDW 13.3 11/27/2022 04:47 AM     11/27/2022 04:47 AM       CMP:   Lab Results   Component Value Date/Time     11/27/2022 04:47 AM    K 3.6 11/27/2022 04:47 AM    CL 97 11/27/2022 04:47 AM    CO2 27 11/27/2022 04:47 AM    BUN 13 11/27/2022 04:47 AM    CREATININE 0.6 11/27/2022 04:47 AM    LABGLOM >60 11/27/2022 04:47 AM    GLUCOSE 149 11/27/2022 04:47 AM    CALCIUM 9.1 11/27/2022 04:47 AM       POC Tests: No results for input(s): POCGLU, POCNA, POCK, POCCL, POCBUN, POCHEMO, POCHCT in the last 72 hours.     Coags:   Lab Results   Component Value Date/Time    PROTIME 14.9 11/27/2022 04:47 AM    INR 1.15 11/27/2022 04:47 AM HCG (If Applicable): No results found for: PREGTESTUR, PREGSERUM, HCG, HCGQUANT     ABGs: No results found for: PHART, PO2ART, UIJ8PGG, IEX4PSN, BEART, V9PMPOBC     Type & Screen (If Applicable):  No results found for: LABABO, LABRH    Drug/Infectious Status (If Applicable):  No results found for: HIV, HEPCAB    COVID-19 Screening (If Applicable): No results found for: COVID19        Anesthesia Evaluation  Patient summary reviewed  Airway: Mallampati: II  TM distance: <3 FB   Neck ROM: full  Mouth opening: > = 3 FB   Dental:          Pulmonary:   (+) shortness of breath:  current smoker                           Cardiovascular:  Exercise tolerance: poor (<4 METS),   (+) CHF:, REYNAGA:,       ECG reviewed      Echocardiogram reviewed               ROS comment: Per pt poor mets     1. Left ventricle: The cavity size was normal. Wall thickness was      normal. Systolic function was normal. The calculated Ejection      Fraction is 56.6%. Wall motion was normal; there were no      regional wall motion abnormalities. Left ventricular diastolic      function parameters were normal. Global longitudinal strain rate      of -19.60%. The longitudal strain study is normal.   2. Mitral valve: Valve area by pressure half-time: 4.1cm^2. 3. Left atrium: The estimated left atrial pressure is 11mm Hg. 4. Right ventricle: The cavity size was normal. Wall thickness was      normal. Systolic function was normal.   5. Pericardium, extracardiac: There was no pericardial effusion. Neuro/Psych:   (+) seizures:,              ROS comment: Prior etoh     Seizures since 26years old no meds   Neuropathy bl legs and feet and buttocks  GI/Hepatic/Renal:   (+) liver disease:, morbid obesity         ROS comment: Tips year ago   Chronically high ammonia   Hx varices. Endo/Other:                     Abdominal:             Vascular:   + PVD, aortic or cerebral, . ROS comment: bl neet neuropathy, bl feet discoloration.  Other Findings:           Anesthesia Plan      general     ASA 4     (Risks of geta discussed pt agrees to proceed)  Induction: intravenous. MIPS: Postoperative opioids intended and Prophylactic antiemetics administered. Anesthetic plan and risks discussed with patient. Use of blood products discussed with patient whom consented to blood products. Plan discussed with CRNA.     Attending anesthesiologist reviewed and agrees with Preprocedure content                RADHA Lou - CRNA   11/28/2022

## 2022-11-28 NOTE — BRIEF OP NOTE
Brief Postoperative Note      Patient: Pavan Arreola  YOB: 1970  MRN: [id number]    Date of Procedure: 11/28/2022    Pre-Op Diagnosis: Other closed fracture of proximal end of left tibia with malunion, subsequent encounter [S82.192P]    Post-Op Diagnosis: Same       Procedure(s):  LEFT TIBIA OPEN REDUCTION INTERNAL FIXATION    Surgeon(s):  Sabrina Barbour DO    Assistant:  * No surgical staff found *    Anesthesia: General    Estimated Blood Loss (mL): less than 50     Complications: None    Specimens:   * No specimens in log *    Implants:  Implant Name Type Inv.  Item Serial No.  Lot No. LRB No. Used Action   GRAFT BONE CRUSH FRZ DRY CANC STRL 1-10MM 15 - [id number]  GRAFT BONE CRUSH FRZ DRY CANC STRL 1-10MM [facility] [id number] [facility] [id number] Left 1 Implanted   PLATE BONE [id number] 5 H LT PROX LAT TIB TI FOR 3.5MM SCR LOQTEQ - [id number]  PLATE BONE [id number] 5 H LT PROX LAT TIB TI FOR 3.5MM SCR LOQTEQ  [employer]WD  Left 1 Implanted   SCREW BONE L65MM OD3.5MM TI ABBIE SM HD T15 ST LOQTEQ - [id number]  SCREW BONE L65MM OD3.5MM TI ABBIE SM HD T15 ST LOQTEQ  [employer]  Left 1 Implanted   SCREW BONE L70MM OD3.5MM TI ABBIE SM HD T15 ST LOQTEQ - [id number]  SCREW BONE L70MM OD3.5MM TI ABBIE SM HD T15 ST LOQTEQ  [employer]WD  Left 1 Implanted   SCREW BONE L75MM OD3.5MM TI ABBIE SM HD T15 ST LOQTEQ - [id number]  SCREW BONE L75MM OD3.5MM TI ABBIE SM HD T15 ST LOQTEQ  [employer]  Left 2 Implanted   SCREW BONE L32MM OD3.5MM TI ABBIE T15 ST LOQTEQ - [id number]  SCREW BONE L32MM OD3.5MM TI ABBIE T15 ST LOQTEQ  [employer]  Left 1 Implanted   SCREW BONE L36MM OD3.5MM TI ABBIE T15 ST - [id number]  SCREW BONE L36MM OD3.5MM TI ABBIE T15 ST  [employer]  Left 1 Implanted   Cortical screw 3.5, T15    [employer]PMM  Left 1 Implanted         Drains: * No LDAs found *    Findings: L lateral tibial plateau fx    Electronically signed by Mamadou 97, DO on 11/28/2022 at 10:12 AM

## 2022-11-28 NOTE — PROGRESS NOTES
V2.0  [facility] Hospitalist Progress Note      Name:  Albina Brown /Age/Sex: 1970  (47 y.o. male)   MRN & CSN:  [id number] & [id number] Encounter Date/Time: 2022 8:31 AM EST    Location:  [address] [place] [address] PCP: Heidi Scruggs MD       Hospital Day: 4    Assessment and Plan:   Albina Brown is a 47 y.o. male with pmh of alcoholic liver cirrhosis, history of variceal bleed in , chronic thrombocytopenia chronic tobacco use disorder who presents with Tibial plateau fracture, left, closed, initial encounter      Plan:  Acute fracture of the lateral tibial plateau: Orthopedic on board s/p  Open reduction internal fixation of left lateral tibial plateau  fracture using AAP 5-hole proximal tibial locking plate with 15 mL of  cancellous bone graft. Review underlying history of cirrhosis. On appropriate pain regimen. PT OT to evaluate the patient after the procedure. Alcoholic liver disease with underlying history of TIPS in , history of variceal bleed 2021: Currently on Lasix per lactone and lactulose. Continue to monitor creatinine levels to titrate diuretics. Monitor for ascites. Chronic thrombocytopenia in the setting of cirrhosis: Continue to monitor  History of GI bleed variceal bleed 2021  Chronic tobacco use disorder    Diet ADULT DIET; Regular   DVT Prophylaxis [] Lovenox, []  Heparin, [] SCDs, [] Ambulation,  [] Eliquis, [] Xarelto  [] Coumadin   Code Status Full Code   Disposition From: Continue home  Expected Disposition: Home  Estimated Date of Discharge: 2 to 3 days  Patient requires continued admission due to needs orthopedic repair of the fracture with PT OT   Surrogate Decision Maker/ POA      Subjective:     Chief Complaint: Knee Pain (L knee injury.  Twisted knee after getting pant leg caught in a cage)     The patient was seen at bedside post procedure, complaining of pos op pain, sleepy at this time, no other concerns     Review of Systems:    Review of Systems There is distension. Palpations: Abdomen is soft. Tenderness: There is no abdominal tenderness. There is no guarding or rebound. Hernia: No hernia is present. Musculoskeletal:         General: No deformity. Normal range of motion. Cervical back: Normal range of motion and neck supple. Right lower leg: No edema. Left lower leg: No edema. Skin:     Coloration: Skin is not jaundiced or pale. Neurological:      General: No focal deficit present. Mental Status: He is alert and oriented to person, place, and time. Mental status is at baseline. Motor: Weakness present. Medications:   Medications:    sodium chloride flush  5-40 mL IntraVENous 2 times per day    ketorolac  30 mg IntraVENous Q6H    ceFAZolin (ANCEF) IVPB  2,000 mg IntraVENous Q8H    sodium chloride flush  5-40 mL IntraVENous 2 times per day    [Held by provider] enoxaparin  30 mg SubCUTAneous BID    furosemide  60 mg Oral Daily    lactulose  30 g Oral BID    spironolactone  150 mg Oral Lunch    multivitamin  1 tablet Oral Daily      Infusions:    lactated ringers      sodium chloride       PRN Meds: sodium chloride flush, 5-40 mL, PRN  sodium chloride, , PRN  oxyCODONE, 5 mg, Q4H PRN   Or  oxyCODONE, 10 mg, Q4H PRN  ondansetron, 4 mg, Q8H PRN   Or  ondansetron, 4 mg, Q6H PRN  bisacodyl, 10 mg, Daily PRN  sodium chloride flush, 5-40 mL, PRN  ondansetron, 4 mg, Q8H PRN   Or  ondansetron, 4 mg, Q6H PRN  polyethylene glycol, 17 g, Daily PRN  acetaminophen, 650 mg, Q6H PRN   Or  acetaminophen, 650 mg, Q6H PRN  albuterol sulfate HFA, 2 puff, Q6H PRN  ibuprofen, 600 mg, Q6H PRN      Labs      No results found for this or any previous visit (from the past 24 hour(s)).        Imaging/Diagnostics Last 24 Hours   XR KNEE LEFT (3 VIEWS)    Result Date: 11/27/2022  EXAMINATION: THREE X-RAY VIEWS OF THE LEFT KNEE 11/25/2022 11:26 pm COMPARISON: None HISTORY: ORDERING SYSTEM PROVIDED HISTORY:  Fall, left knee pain TECHNOLOGIST PROVIDED HISTORY: Reason for Exam:  Fall, left knee pain Reason for Exam: Twisted knee FINDINGS: Three views of the left knee demonstrate an acute, slightly depressed fracture of the lateral tibial plateau. Findings are associated with of large lipohemarthrosis. Elsewhere, no acute fracture or dislocation. No significant degenerative changes. 1. Acute, slightly depressed fracture of the lateral tibial plateau. 2. Large lipohemarthrosis. CT KNEE LEFT WO CONTRAST    Result Date: 11/26/2022  EXAMINATION: CT OF THE LEFT KNEE WITHOUT CONTRAST 11/26/2022 2:45 pm TECHNIQUE: CT of the left knee was performed without the administration of intravenous contrast.  Multiplanar reformatted images are provided for review. Automated exposure control, iterative reconstruction, and/or weight based adjustment of the mA/kV was utilized to reduce the radiation dose to as low as reasonably achievable. COMPARISON: None HISTORY ORDERING SYSTEM PROVIDED HISTORY: Left lateral tibial plateau fracture, preop planning TECHNOLOGIST PROVIDED HISTORY: Reason for exam:->Left lateral tibial plateau fracture, preop planning Reason for Exam: Left lateral tibial plateau fracture, preop planning FINDINGS: Bones: Acute traumatic split depression type lateral tibial plateau fracture is present. The articular surface demonstrates depression in the lateral tibial plateau by approximately 9 mm. Vertically oriented component extends into the lateral tibial metaphysis. The fracture extends to the central tibial spine. No fracture extension into the medial tibial plateau. Distal femur, patella and proximal fibula are intact. Soft Tissue: Muscle bulk is maintained. Soft tissue edema from the recent trauma. Joint: Large lipohemarthrosis. No significant degenerative changes. 1. Acute split depression type lateral tibial plateau fracture.        Electronically signed by Shanita Ashley MD on 11/28/2022 at 12:51 PM

## 2022-11-28 NOTE — PROGRESS NOTES
1010- Patient arrived in PACU from OR, PACU monitors applied and alarms set. Bedside report from ABDI Perez and Duana Habermann, CRNA. Patient appears in no acute distress; arouses to name and converses with staff, respirations equal and unlabored on 4 LPM NC. Patient requested to elevate knee on pillow and completed per request. Ice applied to site. 1024- Patient medicated for pain  1029- Patient medicated for pain  1030- Patient tolerating ice chips  1034- Patient medicated for pain  1039- Patient medicated for pain  1042- Patient states pain level is \"improved\", continues to tolerate ice chips without nausea   1050- Patient states when not in conversation to distract self, pain is worse, rated 7/10 currently and medicated at this time. 1055- Patient repositioned in bed  1100- Patient states pain now 3/10, states pain tolerable   1108- Patient began complaining of \"significant itching\", call to Dr. Aristeo Gomez, see orders. 1114- Patient report to ADBI Andrea   9162- Patient medicated for itching  1125- Patient repositioned in bed  1129- Patient dozing in bed, arouses easily to name call  [phone]- PACU care complete, patient placed on transport list  1140- Patient given ice water per request  [phone]- Patient taken to  by transport at this time. Patient appears in no acute distress, A+Ox4, respirations equal and unlabored, states pain and itching \"improved\", denies needs, glasses on face and cell phone on eft side of bed in patient hand upon leaving PACU.

## 2022-11-29 ENCOUNTER — HOSPITAL ENCOUNTER (INPATIENT)
Age: 52
DRG: 313 | End: 2022-11-29
Attending: PHYSICAL MEDICINE & REHABILITATION | Admitting: PHYSICAL MEDICINE & REHABILITATION
Payer: COMMERCIAL

## 2022-11-29 VITALS
DIASTOLIC BLOOD PRESSURE: 68 MMHG | BODY MASS INDEX: 36.16 KG/M2 | HEIGHT: 66 IN | OXYGEN SATURATION: 97 % | RESPIRATION RATE: 16 BRPM | TEMPERATURE: 98.2 F | SYSTOLIC BLOOD PRESSURE: 117 MMHG | HEART RATE: 90 BPM | WEIGHT: 225 LBS

## 2022-11-29 DIAGNOSIS — S82.122A CLOSED FRACTURE OF LATERAL PORTION OF LEFT TIBIAL PLATEAU, INITIAL ENCOUNTER: Primary | ICD-10-CM

## 2022-11-29 LAB
SARS-COV-2, NAAT: NOT DETECTED
SOURCE: NORMAL

## 2022-11-29 PROCEDURE — 6370000000 HC RX 637 (ALT 250 FOR IP): Performed by: ORTHOPAEDIC SURGERY

## 2022-11-29 PROCEDURE — 97530 THERAPEUTIC ACTIVITIES: CPT

## 2022-11-29 PROCEDURE — 94150 VITAL CAPACITY TEST: CPT

## 2022-11-29 PROCEDURE — 6370000000 HC RX 637 (ALT 250 FOR IP): Performed by: STUDENT IN AN ORGANIZED HEALTH CARE EDUCATION/TRAINING PROGRAM

## 2022-11-29 PROCEDURE — 99223 1ST HOSP IP/OBS HIGH 75: CPT | Performed by: PHYSICAL MEDICINE & REHABILITATION

## 2022-11-29 PROCEDURE — 97162 PT EVAL MOD COMPLEX 30 MIN: CPT

## 2022-11-29 PROCEDURE — 94761 N-INVAS EAR/PLS OXIMETRY MLT: CPT

## 2022-11-29 PROCEDURE — 1280000000 HC REHAB R&B

## 2022-11-29 PROCEDURE — 6360000002 HC RX W HCPCS: Performed by: ORTHOPAEDIC SURGERY

## 2022-11-29 PROCEDURE — 87635 SARS-COV-2 COVID-19 AMP PRB: CPT

## 2022-11-29 PROCEDURE — 97166 OT EVAL MOD COMPLEX 45 MIN: CPT

## 2022-11-29 PROCEDURE — 2580000003 HC RX 258: Performed by: STUDENT IN AN ORGANIZED HEALTH CARE EDUCATION/TRAINING PROGRAM

## 2022-11-29 PROCEDURE — 97535 SELF CARE MNGMENT TRAINING: CPT

## 2022-11-29 PROCEDURE — 6360000002 HC RX W HCPCS: Performed by: STUDENT IN AN ORGANIZED HEALTH CARE EDUCATION/TRAINING PROGRAM

## 2022-11-29 PROCEDURE — 2580000003 HC RX 258: Performed by: ORTHOPAEDIC SURGERY

## 2022-11-29 PROCEDURE — 97116 GAIT TRAINING THERAPY: CPT

## 2022-11-29 RX ORDER — M-VIT,TX,IRON,MINS/CALC/FOLIC 27MG-0.4MG
1 TABLET ORAL DAILY
Status: CANCELLED | OUTPATIENT
Start: 2022-11-30

## 2022-11-29 RX ORDER — LACTULOSE 10 G/15ML
30 SOLUTION ORAL 2 TIMES DAILY
Status: CANCELLED | OUTPATIENT
Start: 2022-11-29

## 2022-11-29 RX ORDER — KETOROLAC TROMETHAMINE 30 MG/ML
30 INJECTION, SOLUTION INTRAMUSCULAR; INTRAVENOUS EVERY 6 HOURS
Status: CANCELLED | OUTPATIENT
Start: 2022-11-29 | End: 2022-12-01

## 2022-11-29 RX ORDER — ALBUTEROL SULFATE 90 UG/1
2 AEROSOL, METERED RESPIRATORY (INHALATION) EVERY 6 HOURS PRN
Status: CANCELLED | OUTPATIENT
Start: 2022-11-29

## 2022-11-29 RX ORDER — IBUPROFEN 400 MG/1
600 TABLET ORAL EVERY 6 HOURS PRN
Status: DISCONTINUED | OUTPATIENT
Start: 2022-11-29 | End: 2022-11-29

## 2022-11-29 RX ORDER — ALBUTEROL SULFATE 90 UG/1
2 AEROSOL, METERED RESPIRATORY (INHALATION) EVERY 6 HOURS PRN
Status: DISCONTINUED | OUTPATIENT
Start: 2022-11-29 | End: 2022-12-07 | Stop reason: HOSPADM

## 2022-11-29 RX ORDER — ACETAMINOPHEN 650 MG/1
650 SUPPOSITORY RECTAL EVERY 6 HOURS PRN
Status: DISCONTINUED | OUTPATIENT
Start: 2022-11-29 | End: 2022-11-29

## 2022-11-29 RX ORDER — OXYCODONE HYDROCHLORIDE 10 MG/1
10 TABLET ORAL EVERY 4 HOURS PRN
Status: DISCONTINUED | OUTPATIENT
Start: 2022-11-29 | End: 2022-12-05

## 2022-11-29 RX ORDER — ACETAMINOPHEN 650 MG/1
650 SUPPOSITORY RECTAL EVERY 6 HOURS PRN
Status: CANCELLED | OUTPATIENT
Start: 2022-11-29

## 2022-11-29 RX ORDER — OXYCODONE HYDROCHLORIDE 5 MG/1
5 TABLET ORAL EVERY 4 HOURS PRN
Status: DISCONTINUED | OUTPATIENT
Start: 2022-11-29 | End: 2022-12-05

## 2022-11-29 RX ORDER — POLYETHYLENE GLYCOL 3350 17 G/17G
17 POWDER, FOR SOLUTION ORAL DAILY PRN
Status: DISCONTINUED | OUTPATIENT
Start: 2022-11-29 | End: 2022-12-07 | Stop reason: HOSPADM

## 2022-11-29 RX ORDER — KETOROLAC TROMETHAMINE 30 MG/ML
30 INJECTION, SOLUTION INTRAMUSCULAR; INTRAVENOUS EVERY 6 HOURS
Status: DISCONTINUED | OUTPATIENT
Start: 2022-11-29 | End: 2022-12-01

## 2022-11-29 RX ORDER — OXYCODONE HYDROCHLORIDE 5 MG/1
5 TABLET ORAL EVERY 4 HOURS PRN
Status: CANCELLED | OUTPATIENT
Start: 2022-11-29

## 2022-11-29 RX ORDER — SPIRONOLACTONE 50 MG/1
150 TABLET, FILM COATED ORAL
Status: CANCELLED | OUTPATIENT
Start: 2022-11-30

## 2022-11-29 RX ORDER — SPIRONOLACTONE 50 MG/1
150 TABLET, FILM COATED ORAL
Status: DISCONTINUED | OUTPATIENT
Start: 2022-11-30 | End: 2022-12-07 | Stop reason: HOSPADM

## 2022-11-29 RX ORDER — M-VIT,TX,IRON,MINS/CALC/FOLIC 27MG-0.4MG
1 TABLET ORAL DAILY
Status: DISCONTINUED | OUTPATIENT
Start: 2022-11-30 | End: 2022-12-07 | Stop reason: HOSPADM

## 2022-11-29 RX ORDER — IBUPROFEN 600 MG/1
600 TABLET ORAL EVERY 6 HOURS PRN
Status: CANCELLED | OUTPATIENT
Start: 2022-11-29 | End: 2022-12-01

## 2022-11-29 RX ORDER — SODIUM CHLORIDE 0.9 % (FLUSH) 0.9 %
5-40 SYRINGE (ML) INJECTION EVERY 12 HOURS SCHEDULED
Status: DISCONTINUED | OUTPATIENT
Start: 2022-11-29 | End: 2022-12-07 | Stop reason: HOSPADM

## 2022-11-29 RX ORDER — LACTULOSE 10 G/15ML
30 SOLUTION ORAL 2 TIMES DAILY
Status: DISCONTINUED | OUTPATIENT
Start: 2022-11-29 | End: 2022-12-07 | Stop reason: HOSPADM

## 2022-11-29 RX ORDER — POLYETHYLENE GLYCOL 3350 17 G/17G
17 POWDER, FOR SOLUTION ORAL DAILY PRN
Status: CANCELLED | OUTPATIENT
Start: 2022-11-29

## 2022-11-29 RX ORDER — OXYCODONE HYDROCHLORIDE 10 MG/1
10 TABLET ORAL EVERY 4 HOURS PRN
Status: CANCELLED | OUTPATIENT
Start: 2022-11-29

## 2022-11-29 RX ORDER — ACETAMINOPHEN 325 MG/1
650 TABLET ORAL EVERY 6 HOURS PRN
Status: DISCONTINUED | OUTPATIENT
Start: 2022-11-29 | End: 2022-11-29

## 2022-11-29 RX ORDER — ENOXAPARIN SODIUM 100 MG/ML
30 INJECTION SUBCUTANEOUS 2 TIMES DAILY
Status: CANCELLED | OUTPATIENT
Start: 2022-11-29 | End: 2023-01-08

## 2022-11-29 RX ORDER — SODIUM CHLORIDE 0.9 % (FLUSH) 0.9 %
5-40 SYRINGE (ML) INJECTION EVERY 12 HOURS SCHEDULED
Status: CANCELLED | OUTPATIENT
Start: 2022-11-29

## 2022-11-29 RX ORDER — POLYETHYLENE GLYCOL 3350 17 G/17G
17 POWDER, FOR SOLUTION ORAL DAILY PRN
Status: DISCONTINUED | OUTPATIENT
Start: 2022-11-29 | End: 2022-11-29

## 2022-11-29 RX ORDER — ACETAMINOPHEN 325 MG/1
650 TABLET ORAL EVERY 6 HOURS PRN
Status: CANCELLED | OUTPATIENT
Start: 2022-11-29

## 2022-11-29 RX ORDER — ENOXAPARIN SODIUM 100 MG/ML
30 INJECTION SUBCUTANEOUS 2 TIMES DAILY
Status: DISCONTINUED | OUTPATIENT
Start: 2022-11-29 | End: 2022-12-06

## 2022-11-29 RX ADMIN — KETOROLAC TROMETHAMINE 30 MG: 30 INJECTION, SOLUTION INTRAMUSCULAR; INTRAVENOUS at 00:31

## 2022-11-29 RX ADMIN — KETOROLAC TROMETHAMINE 30 MG: 30 INJECTION, SOLUTION INTRAMUSCULAR; INTRAVENOUS at 06:19

## 2022-11-29 RX ADMIN — SPIRONOLACTONE 150 MG: 50 TABLET ORAL at 14:52

## 2022-11-29 RX ADMIN — CEFAZOLIN 2000 MG: 2 INJECTION, POWDER, FOR SOLUTION INTRAMUSCULAR; INTRAVENOUS at 00:33

## 2022-11-29 RX ADMIN — LACTULOSE 30 G: 10 SOLUTION ORAL at 10:51

## 2022-11-29 RX ADMIN — Medication 1 TABLET: at 10:51

## 2022-11-29 RX ADMIN — IBUPROFEN 600 MG: 600 TABLET ORAL at 10:57

## 2022-11-29 RX ADMIN — ENOXAPARIN SODIUM 30 MG: 100 INJECTION SUBCUTANEOUS at 21:10

## 2022-11-29 RX ADMIN — SODIUM CHLORIDE, PRESERVATIVE FREE 10 ML: 5 INJECTION INTRAVENOUS at 21:17

## 2022-11-29 RX ADMIN — SODIUM CHLORIDE, POTASSIUM CHLORIDE, SODIUM LACTATE AND CALCIUM CHLORIDE: 600; 310; 30; 20 INJECTION, SOLUTION INTRAVENOUS at 06:24

## 2022-11-29 RX ADMIN — IBUPROFEN 600 MG: 400 TABLET, FILM COATED ORAL at 19:18

## 2022-11-29 RX ADMIN — KETOROLAC TROMETHAMINE 30 MG: 30 INJECTION, SOLUTION INTRAMUSCULAR; INTRAVENOUS at 14:52

## 2022-11-29 RX ADMIN — FUROSEMIDE 60 MG: 40 TABLET ORAL at 10:51

## 2022-11-29 RX ADMIN — KETOROLAC TROMETHAMINE 30 MG: 30 INJECTION, SOLUTION INTRAMUSCULAR; INTRAVENOUS at 21:10

## 2022-11-29 RX ADMIN — LACTULOSE 30 G: 10 SOLUTION ORAL at 21:17

## 2022-11-29 ASSESSMENT — PAIN DESCRIPTION - DESCRIPTORS
DESCRIPTORS: ACHING;THROBBING
DESCRIPTORS: ACHING;DISCOMFORT
DESCRIPTORS: CRAMPING;TEARING
DESCRIPTORS: DISCOMFORT;ACHING

## 2022-11-29 ASSESSMENT — PAIN DESCRIPTION - ORIENTATION
ORIENTATION: LEFT

## 2022-11-29 ASSESSMENT — ENCOUNTER SYMPTOMS
EYE DISCHARGE: 0
DIARRHEA: 1
ABDOMINAL PAIN: 1
BACK PAIN: 0
SINUS PRESSURE: 0
ABDOMINAL DISTENTION: 1
COUGH: 0
VOICE CHANGE: 0
VOMITING: 0
BLOOD IN STOOL: 0
EYE PAIN: 0
NAUSEA: 1
SHORTNESS OF BREATH: 0
CHEST TIGHTNESS: 0
SINUS PAIN: 0

## 2022-11-29 ASSESSMENT — PAIN SCALES - WONG BAKER: WONGBAKER_NUMERICALRESPONSE: 0

## 2022-11-29 ASSESSMENT — PAIN SCALES - GENERAL
PAINLEVEL_OUTOF10: 0
PAINLEVEL_OUTOF10: 7
PAINLEVEL_OUTOF10: 4
PAINLEVEL_OUTOF10: 4
PAINLEVEL_OUTOF10: 6
PAINLEVEL_OUTOF10: 3
PAINLEVEL_OUTOF10: 4
PAINLEVEL_OUTOF10: 4

## 2022-11-29 ASSESSMENT — PAIN DESCRIPTION - LOCATION
LOCATION: LEG;ANKLE
LOCATION: KNEE;ANKLE
LOCATION: KNEE
LOCATION: LEG;FOOT

## 2022-11-29 ASSESSMENT — PAIN - FUNCTIONAL ASSESSMENT
PAIN_FUNCTIONAL_ASSESSMENT: PREVENTS OR INTERFERES SOME ACTIVE ACTIVITIES AND ADLS
PAIN_FUNCTIONAL_ASSESSMENT: PREVENTS OR INTERFERES SOME ACTIVE ACTIVITIES AND ADLS

## 2022-11-29 NOTE — CARE COORDINATION
CM reviewed notes. Post op day 1 LEFT TIBIA OPEN REDUCTION INTERNAL FIXATION. PT/OT to see. Will follow with recommendations. [address]  1140 CM into see pt to initiate a safe discharge plan. Cm into see, introduced self and explained role of CM. Pt is kind, alert and oriented. Pt lives alone. Patient has a cane. Will need a walker when discharged. Pt has a PCP. Pt has insurance and is able to obtain his prescriptions. CM discussed the options for ARU per recommendations. Pt is agreeable. CM left a VM with [employer] for ARU. CM team following for the determination.   CM provided card and encouraged to call for any needs or concern. CM is available if any needs arise. 1440 pt is approved for ARU and may go whenever medically cleared.  is aware per Antonieta Thomas. Pt will need a Rapid covid prior to going. CM PS to Dr Nickie Torres.  [address]

## 2022-11-29 NOTE — CARE COORDINATION
Spoke with patient and discussed ARU. Explained to patient the required 3 hours of therapy a day. Also explained the average length of stay is 11 days, could be longer or shorter depending on recommendations of therapy and Dr. Ailyn Miller. Patient expresses his understanding and states he's agreeable to admit to ARU. Per patient his goal is to return home alone and to PLOF. Per patient his family recently left for vacation and brought him up some clothes to the hospital.     Patient has been approved for ARU. Patients primary insurance is [employer]. Per [employer] policy ARU will use auth # for acute care stay. Auth #[id number]. Notified MD and CM of approval.     Will need rapid COVID, d/c, and readmit orders when MD feels patient is medically ready. This CL will need to be notified when MD feels patient is medically ready. Patient meets criteria and is approved to come to ARU. Patient able to admit once medically stable and after ARU Medical Director and  sign the pre-admission screen (PAS), pending rapid COVID results.

## 2022-11-29 NOTE — PROGRESS NOTES
Occupational Therapy    [facility] ACUTE CARE OCCUPATIONAL THERAPY EVALUATION    Wayne Granado, 1970, 1107/1107-A, 11/29/2022    Discharge Recommendation: Inpatient Rehabilitation      History:  Tribal:  The encounter diagnosis was Tibial plateau fracture, left, closed, initial encounter. Subjective:  Patient states: \"I just realized this; how in the world am I going to get into my house? \"  Pain: Pt reported 3/10 surgical pain in Lt leg at rest  Communication with other providers: PT Jose Marinelli, RN UNRULY Carroll  Restrictions: General Precautions, Fall Risk, NWB Lt LE, ROM as tolerated Lt knee, IV, Bed/chair alarm    Home Setup/Prior level of function:  Social/Functional History  Lives With: Alone  Type of Home: House  Home Layout: One level  Home Access: Stairs to enter without rails  Entrance Stairs - Number of Steps: 1 (from front), 3+3 (through garage)  Entrance Stairs - Rails: None  Bathroom Shower/Tub: Tub/Shower unit  Bathroom Toilet: Standard  Bathroom Equipment: Shower chair, Grab bars in shower  Home Equipment: Cane, Crutches  ADL Assistance: [address] [place] [address] Avenue: Independent  Homemaking Responsibilities: Yes  Ambulation Assistance: Independent (uses cane in community)  Transfer Assistance: Independent  Active : Yes  Mode of Transportation: Truck  Occupation: Unemployed    Examination:  Observation: Supine in bed upon arrival. Pleasant and agreeable to evaluation. Very talkative requiring frequent re-direction.    Vision: WFL (Glasses)  Hearing: WFL  Vitals: Stable vitals throughout session    Body Systems and functions:  ROM: WNL all joints in BL UEs  Strength: 4+/5 MMT all major muscle groups BL UEs  Sensation: Impaired (significant neuropathy in BL LEs)  Tone: Normal  Coordination: WNL  Perception: WNL    Activities of Daily Living (ADLs):  Feeding: Independent   Grooming: CGA (completed hand hygiene in standing at sink on Rt LE; min cues for safe RW placement and maintaining NWB status Lt LE)  UB bathing: SBA   LB bathing: Mod A (reaching distal BL LEs)  UB dressing: SBA (donning clean robe seated EOB)  LB dressing: Max A (dependent with donning BL socks this date; mod A for donning clean brief with assist for threading both legs through openings, able to manage brief to knees in sitting without assist and manage to hips in standing with CGA for steadying on Rt LE with RW  Toileting: CGA (pt urinated while seated on regular toilet; CGA for steadying with brief mgmt both directions while on Rt LE, able to complete seated hygiene without assist)    Cognitive and Psychosocial Functioning:  Overall cognitive status: WFL (pt very talkative requiring frequent re-direction)  Affect: Normal     Balance:   Sitting: SBA in unsupported sitting EOB and on toilet  Standing: CGA with RW on Rt LE    Functional Mobility:  Bed Mobility: SBA supine to sitting EOB (HOB elevated to 30', increased time/effort, utilized BL UEs to scoot Lt LE to edge)  Transfers: CGA on Rt LE to/from bed, recliner, and toilet (min cues for technique/maintaining NWB status Lt LE)  Ambulation: CGA with RW on Rt LE using hop gait pattern to/from bathroom for toileting; min coaching for technique, able to maintain NWB status Lt LE      AM-PAC 6 click short form for inpatient daily activity:   How much help from another person does the patient currently need. .. Unable  Dep A Lot  Max A A Lot   Mod A A Little  Min A A Little   CGA  SBA None   Mod I  Indep  Sup   1. Putting on and taking off regular lower body clothing? [] 1    [x] 2   [] 2   [] 3   [] 3   [] 4      2. Bathing (including washing, rinsing, drying)? [] 1   [] 2   [x] 2 [] 3 [] 3 [] 4   3. Toileting, which includes using toilet, bedpan, or urinal? [] 1    [] 2   [] 2   [] 3   [x] 3   [] 4     4. Putting on and taking off regular upper body clothing? [] 1   [] 2   [] 2   [] 3   [x] 3    [] 4      5. Taking care of personal grooming such as brushing teeth?  [] 1   [] 2 [] 2 [] 3    [x] 3   [] 4      6. Eating meals? [] 1   [] 2   [] 2   [] 3   [] 3   [x] 4      Raw Score:  18    [24=0% impaired(CH), 23=1-19%(CI), 20-22=20-39%(CJ), 15-19=40-59%(CK), 10-14=60-79%(CL), 7-9=80-99%(CM), 6=100%(CN)]     Treatment:  Therapeutic Activity Training:   Therapeutic activity training was instructed today. Cues were given for safety, sequence, UE/LE placement, awareness, and balance. Activities performed today included bed mobility training, transfer training on Rt LE, functional mobility with RW using hop gait pattern on Rt LE, education on role of OT, POC, NWB status Lt LE, importance of EOB/OOB activity, d/c planning and recommendations   Self Care Training:   Cues were given for safety, sequence, UE/LE placement, visual cues, and balance. Activities performed today included UB/LB dressing tasks of donning robe/socks/brief, toileting, hand hygiene at sink on Rt LE    Safety Measures: Gait belt used, Left in Chair, Alarm in place    Assessment:  Pt is a 47year old male with a past medical history of Alcoholic cirrhosis of liver ([facility] [address] Esophageal varices ([facility] [address] and Neuropathy. Pt admitted following a fall and diagnosed with a Lt lateral tibial plateau fracture. Pt underwent ORIF of Lt tibial plateau on 42-80. Pt lives at home alone and at baseline is fully independent with ADLs, IADLs, mobility, and driving. Pt currently presents with the above impairments, and is not safe for immediate return home alone. Recommend continued OT services in inpatient rehab setting at discharge. Complexity: Moderate  Prognosis: Good  Plan: 3x/week      Goals:  1. Pt will complete all aspects of bed mobility for EOB/OOB ADLs mod I with HOB flat  2. Pt will complete UB/LB bathing min A using long handled sponge PRN  3. Pt will complete all aspects of LB dressing min A using AE PRN  4. Pt will complete all functional transfers to and from bed, chair, toilet, shower chair SBA on Rt LE  5.  Pt will ambulate HH distance to bathroom for toileting SBA with RW on Rt LE using hop gait pattern  6. Pt will complete all aspects of toileting task SBA  7.  Pt will complete oral hygiene/grooming routine in unsupported sitting EOB independently    Time:   Time in: 922  Time out: 957  Timed treatment minutes: 25  Total time: 35    Electronically signed by:    TREV Orozco/L, [address] [place] [phone]

## 2022-11-29 NOTE — PROGRESS NOTES
4 Eyes Skin Assessment     NAME:  Lora Greenberg  YOB: 1970  MEDICAL RECORD NUMBER:  [id number]    The patient is being assessed for  Admission    I agree that One RN have performed a thorough Head to Toe Skin Assessment on the patient. ALL assessment sites listed below have been assessed. Areas assessed by both nurses:    Head, Face, Ears, Shoulders, Back, Chest, Arms, Elbows, Hands, Sacrum. Buttock, Coccyx, Ischium, and Legs. Feet and Heels        Does the Patient have a Wound? Yes wound(s) were present on assessment.  LDA wound assessment was Initiated and completed by RN       José Prevention initiated by RN: Yes   Wound Care Orders initiated by RN: NA    Pressure Injury (Stage 3,4, Unstageable, DTI, NWPT, and Complex wounds) if present place referral order by RN under : No    New and Established Ostomies, if present place, referral order under : No      Nurse 1 eSignature: Electronically signed by Thelma Gil RN on 11/29/22 at 6:51 PM EST    **SHARE this note so that the co-signing nurse is able to place an eSignature**    Nurse 2 eSignature: Electronically signed by Celeste Lombardi RN on 11/29/22 at 6:51 PM EST

## 2022-11-29 NOTE — PLAN OF CARE
ARU Interdisciplinary Plan of Care [facility]  [address]st [address] [facility]  Tobin Dennison, [address]  [phone]  Fax: [phone]        Jeremy Harper    : 1970  Acct #: [de-identified]  MRN: [id number]   PHYSICIAN:  James Calderon MD  Primary Active Problems:   Active Hospital Problems    Diagnosis Date Noted    Uncontrolled pain [R52] 2022     Priority: Medium    Generalized weakness [R53.1] 2022     Priority: Medium    Gait disturbance [R26.9] 2022     Priority: Medium    Alcoholic liver disease ([address] [K70.9] 2022     Priority: Medium    Alcoholic cirrhosis of liver without ascites ([address] [K70.30] 2022     Priority: Medium    Thrombocytopenia ([address] [D69.6] 2022     Priority: Medium    Alcoholic peripheral neuropathy ([address] [G62.1] 2022     Priority: Medium    Closed fracture of lateral portion of left tibial plateau, initial encounter [employer] 2022     Priority: Medium       Rehabilitation Diagnosis:     Closed fracture of lateral portion of left tibial plateau, initial encounter [S82.122A]          CARE PLAN     NURSING:  Jeremy Meredith while on this unit will:      Bowel and Bladder   [] Be continent of bowel and bladder      [x] Have an adequate number of bowel movements   [] Urinate with no urinary retention >300ml in bladder   [] Bladder Scan: (details)   [] Complete bladder protocol with rivera removal   [] Initiate Bladder Program to toilet every ___ hours   [] Initiate Bowel Program to toilet every ___hours   [] Bladder training    [] Bowel training  Pulmonary   [x] Maintain O2 SATs at 92% or greater  Pain Management   [x] Have pain managed while on ARU        [] Be pain free by discharge    [] Medication Management and Education  Maintenance of Skin Integrity/Wound Management   [x] Have no skin breakdown while on ARU   [] Have improved skin integrity via wound measurements   [x] Have no signs/symptoms of infection via infection protection and monitoring at the          wound site  Fall Prevention   [x] Be free from injury during hospitalization via fall prevention measures     [] Disease management and Education  Precautions   [x] Weight Bearing Precautions   [] Swallowing Precautions   [x] Monitoring of Risks of Complications   [x] DVT Prophylaxis    [] Fluid/electrolyte/Nutrition Management    [x] Complete education with patient/family with understanding demonstrated for          in-room safety with transfers to bed, toilet, wheelchair, shower as well as                bathroom activities and hygiene. [] Adjustment   [x] Other:   Nursing interventions may include bowel/bladder training, education for medical assistive devices, medication education, O2 saturation management, energy conservation, stress management techniques, fall prevention, alarms protocol, seating and positioning, skin/wound care, pressure relief instruction,dressing changes,  infection protection, DVT prophylaxis, and/or assistance with in room safety with transfers to bed, toilet, wheelchair, shower as well as bathroom activities and hygiene.      Patient/caregiver education for:   [x] Disease/sustained injury/management      [x] Medication Use   [] Surgical intervention   [x] Safety/Precautions   [] Body mechanics and or joint protection   [x] Health maintenance         PHYSICAL THERAPY:  Goals:                  Short Term Goals  Time Frame for Short Term Goals: 7-10 days  Short Term Goal 1: pt to complete all bed mobility mod I  Short Term Goal 2: Pt to complete sit to stand to/from bed, commode, and car transfers with  mod I  Short Term Goal 3: Pt will ambulate 50' on level surfaces and 10' on unlevel surfaces with 2ww and NWB LLE with mod I, supervision for 150' on level surface  Short Term Goal 4: Pt will ascend/descend curb step with 2ww and 4 steps with rail with CGA  Short Term Goal 5: Pt will  object with reacher and 2ww with mod I  Additional Goals?: Yes  Short Term Goal 6: pt will propel mod I in w/c for >500'               These goals were reviewed with this patient at the time of assessment and Graeme Chanel is in agreement. Plan of Care: Pt to be seen 5 days per week for a minimum of 60 minutes for 8 days. Current Treatment Recommendations: Strengthening, Balance training, Functional mobility training, Transfer training, Endurance training, IADL training, Gait training, Stair training, Neuromuscular re-education, Return to work related activity, Pain management, Patient/Caregiver education & training, Equipment evaluation, education, & procurement, Therapeutic activities, Positioning, Safety education & training community reintegration,animal assisted therapy, and concurrent/group therapy.     PT IRF-JUDE scores and goals for initial assessment:   Bed Mobility:   Sit to Lying  Assistance Needed: Supervision or touching assistance  Comment: supervision with difficulty bringing LLE into bed  CARE Score: 4  Discharge Goal: Independent  Roll Left and Right  Assistance Needed: Independent  Comment: no bed features, uses RLE to assist LLE  CARE Score: 6  Discharge Goal: Independent  Lying to Sitting on Side of Bed  Assistance Needed: Supervision or touching assistance  Comment: handed pt leg , cues for use  CARE Score: 4  Discharge Goal: Independent    Transfers:    Sit to Stand  Assistance Needed: Supervision or touching assistance  Comment: SBA to 2ww with cues for technique  CARE Score: 4  Discharge Goal: Independent  Chair/Bed-to-Chair Transfer  Assistance Needed: Supervision or touching assistance  Comment: SBA with 2ww  CARE Score: 4  Discharge Goal: Independent     Car Transfer  Comment: feel pt would be able to attempt next session  Reason if not Attempted: Not attempted due to medical condition or safety concerns  CARE Score: 88  Discharge Goal: Independent    Ambulation:    Walking Ability  Does the Patient Walk?: Yes     Walk 10 Feet  Assistance Needed: Supervision or touching assistance  Comment: CGA with 2ww, NWB LLE, good clearance hopping  CARE Score: 4  Discharge Goal: Independent     Walk 50 Feet with Two Turns  Assistance Needed: Supervision or touching assistance  Comment: CG with 2ww, NWB LLE  CARE Score: 4  Discharge Goal: Independent     Walk 150 Feet  Comment: 54' max distance  Reason if not Attempted: Not attempted due to medical condition or safety concerns  CARE Score: 88  Discharge Goal: Supervision or touching assistance     Walking 10 Feet on Uneven Surfaces  Comment: Pt may be able to attempt next session  Reason if not Attempted: Not attempted due to medical condition or safety concerns  CARE Score: 88  Discharge Goal: Independent     1 Step (Curb)  Comment: feel pt may be able to attempt next session  Reason if not Attempted: Not attempted due to medical condition or safety concerns  CARE Score: 88  Discharge Goal: Supervision or touching assistance     4 Steps  Comment: feel pt may be able to attempt next session  Reason if not Attempted: Not attempted due to medical condition or safety concerns  CARE Score: 88  Discharge Goal: Supervision or touching assistance     12 Steps  Reason if not Attempted: Not attempted due to medical condition or safety concerns  CARE Score: 88  Discharge Goal: Not Attempted    Gait Deviations: []None []Slow babak  [] Increased REESE  [] Staggers []Deviated Path  [] Decreased step length  [] Decreased step height  []Decreased arm swing  [] Shuffles  [] Decreased head and trunk rotation  []other:        Wheelchair:  w/c Ability: Wheelchair Ability  Uses a Wheelchair and/or Scooter?: Yes  Wheel 50 Feet with Two Turns  Assistance Needed: Supervision or touching assistance  Comment: verbal cues  CARE Score: 4  Discharge Goal: Independent  Wheel 150 Feet  Assistance Needed: Supervision or touching assistance  CARE Score: 4  Discharge Goal: Independent          Balance:  Object: Picking Up Object  Assistance Needed: Supervision or touching assistance  Comment: supervision with 2ww and reacher  CARE Score: 4  Discharge Goal: Independent  OCCUPATIONAL THERAPY:  Goals:             Short Term Goals  Time Frame for Short Term Goals: STGs=LTGs :  Long Term Goals  Time Frame for Long Term Goals : 7-10 days or until d/c. Long Term Goal 1: Pt will complete self feeding with Mod I.  Long Term Goal 2: Pt will complete oral hygiene and grooming tasks with Mod I.  Long Term Goal 3: Pt will complete total body bathing with AE PRN and Mod I.  Long Term Goal 4: Pt will complete UB dressing with IND. Long Term Goal 5: Pt will complete LB dressing with AE PRN and Mod I. Additional Goals?: Yes  Long Term Goal 6: Pt will doff/don footwear with AE PRN and Mod I.  Long Term Goal 7: Pt will perform toileting with Mod I.  Long Term Goal 8: Pt will perform functional transfers (bed, chair, toilet, shower) with DME PRN and Mod I.  Long Term Goal 9: Pt will perform therex/therax to facilitate an increase in strength/endurance (with emphasis on dynamic standing balance/tolerance > 8 mins) with Mod I.  Long Term Goal 10: Pt will perform meal prep with Mod I. :    These goals were reviewed with this patient at the time of assessment and Nuzhat Rocha is in agreement    Plan of Care:  Pt to be seen 5 days per week for a minimum of 60 minutes for 10 days.           Occupational Therapy Plan  Current Treatment Recommendations: Strengthening, ROM, Balance training, Functional mobility training, Endurance training, Pain management, Safety education & training, Patient/Caregiver education & training, Equipment evaluation, education, & procurement, Positioning, Self-Care / ADL, Home management training, Coordination training         cognitive training, home management, energy conservation training, community reintegration, splint fabrication, patient/caregiver education and training, animal assisted therapy, and concurrent and/or group therapy. OT IRF-JUDE scores and goals for initial assessment:    ADLs:    Eating: Eating  Assistance Needed: Setup or clean-up assistance  Comment: Pt reports occasional assist opening containers, able to feed self IND. CARE Score: 5  Discharge Goal: Independent       Oral Hygiene: Oral Hygiene  Assistance Needed: Setup or clean-up assistance  Comment: Setup assist seated sinkside. CARE Score: 5  Discharge Goal: Independent    UB/LB Bathing: Shower/Bathe Self  Assistance Needed: Partial/moderate assistance  Comment: Pt completed full shower, seated for entirety, weight shifting to wash rear. Pt requires assist washing distal LLE. CARE Score: 3  Discharge Goal: Independent    UB Dressing: Upper Body Dressing  Assistance Needed: Supervision or touching assistance  Comment: Sup to don pullover shirt. CARE Score: 4  Discharge Goal: Independent         LB Dressing: Lower Body Dressing  Assistance Needed: Supervision or touching assistance  Comment: Pt able to thread BLEs into depends with use of reacher, able to manage over hips with CGA. CARE Score: 4  Discharge Goal: Independent    Donning and [place] Footwear: Putting On/Taking Off Footwear  Assistance Needed: Partial/moderate assistance  Comment: Pt able to doff/don sock and shoe on R foot, however unable to complete on L foot. CARE Score: 3  Discharge Goal: Independent      Toiletin [address] needed: Supervision or touching assistance  Comment: CGA in stance to perform clothing management and bowel hygiene as Pt shaky balancing self on the R foot following NWB precautions on L. CARE Score: 4  Discharge Goal: Independent      Toilet Transfers: Toilet Transfer  Assistance needed: Supervision or touching assistance  Comment: CGA with use of 2ww and Max verbal cues for safety as Pt impulsive with urgency.   CARE Score: 4  Discharge Goal: Independent      SPEECH THERAPY: (If ordered)  Plan of Care and Goals: LTG                                                            LTG:                           Treatments may include speech/language/communication therapy, cognitive training, animal assisted therapy, group therapy, education, and/or dysphagia therapy based on the above goals. Co-treats where appropriate with PT or OT to facilitate patient goals in functional tasks. These goals were reviewed with this patient at the time of assessment and Bonnie Bonilla is in agreement. CASE MANAGEMENT:  Goals:   Assist patient/family with discharge planning, patient/family counseling, assistance in obtaining recommended equipment and other services, and coordination with insurance during ARU stay. Patient Goals:  Return to maximum level of independent function. Nutrition goal: Patient will consume at least 75% at meals during stay    Activities Prior to Admit:   Homemaking Responsibilities: Yes  Active : Yes  Mode of Transportation: Truck  Occupation: On disability            Intensity of [address] will be seen a minimum of 3 hours of therapy per day/a minimum of 5 out of 7 days per week. [] In this rare instance due to the nature of this patient's medical involvement, this patient will be seen 15 hours per week (900 minutes within a 7 day period). Treatments may include therapeutic exercises, gait training, neuromuscular re-ed, transfer training, community reintegration, bed mobility, w/c mobility and training, self care, home mgmt, cognitive training, energy conservation,dysphagia tx, speech/language/communication therapy, group therapy, and patient/family education. In addition, dietician/nutritionist may monitor calorie count as well as intake and collaboratively work with SLP on dietary upgrades. Neuropsychology/Psychology may evaluate and provide necessary support.   Group therapy as appropriate to facilitate improved endurance, STR, COORD, function, safety, transfers, awareness and insight into deficits, problem solving, memory, and social interaction and engagement. Medical issues being managed closely and that require 24 hour availability of a physician:   [] Swallowing Precautions                                     [x] Weight bearing precautions   [x] Wound Care                             [x] Infection Prevention   [x] DVT Prophylaxis/assessment              [x] Monitoring for complications    [x] Fall Precautions/Prevention                         [x] Fluid/Electrolyte/Nutrition Balance   [] Voice Protection                           [x] Medication Management   [x] Respiratory                   [x] Pain Mgmt   [x] Bowel/Bladder Fx    Medical Prognosis: [] Good  [x] Fair    [] Guarded   Total expected IRF days 12                                            Physician anticipated functional outcomes:  FWW and HHC OT/PT and supervision. Rehab Goals:   [] Return to premorbid function of_______________________________.    [] Independent   [] Mod I  [x] Supervision  [] CGA   [] Min A   [] Mod A  Level for ambulation []without assistive device  [x] with assistive device        [] Independent   [] Mod I  [x] Supervision [] CGA   [] Min A   [] Mod A  Level for transfers []without assistive device  [x] with assistive device         [] Independent   [] Mod I  [x] Supervision [] CGA   [] Min A   [] Mod A Level with ADL's []without assistive device   [x] with assistive device     ___________________     Level with cognitive skills requiring [] No assist [x] Supervision  [] Active Assist/Cues     [] Maximize level of mobility and ADL's to decrease burden on caregiver    IPOC brief synthesis of Preadmission Screen, Post-Admission Evaluation, and Therapy Evaluations: Acute inpatient rehabilitation with occupational and physical therapy 180 minutes 5 out of every 7 days. Will address basic and  advancing mobility with self-care instruction and adaptive equipment training.  Caregiver education will be offered. Expected length of stay  prior to a supervised level of function for discharge home with a walker and C OT/PT is 2 weeks. Additional recommendation:     Left lateral tibial plateau fracture with gait disturbance: The patient requires daily occupational and physical therapy. We must teach him techniques for self-care and mobility following no weightbearing through the left lower limb. Immobilizer on the left knee at all times. Monitoring his incision for signs of infection. He needs aggressive pulmonary hygiene measures, nutritional support, pain management and DVT prophylaxis. Outpatient follow-up with orthopedics in 2 weeks. DVT prophylaxis: Lovenox 30 mg SQ twice daily. His surgeon has committed him to a 6-week course. He will need instruction on self administration of this medication. Monitoring his hemoglobin and platelet count periodically while on this medication. Close monitoring of his thrombocytopenia. Uncontrolled pain: He comes with orders for scheduled Toradol for the next 3 days. He has oxycodone available as well. Cryotherapy. Avoiding acetaminophen products while struggling with cirrhosis. Alcoholic liver disease: Chronulac 10 g twice daily. No Tylenol products. Cautious diuresis. Neuropathy: He may be a candidate for gabapentin. Weightbearing through his right lower limb is much as possible. Anticipated discharge destination:    [] Home Independently   [x] Home with supervision    []SNF     [] Other       This plan has been reviewed with me in a language I understand.  I have had the opportunity to include my input with my therapy team.    ________________________________________________   ______________________  Patient/Significant Other      Date    I have reviewed this initial plan of care and agree with its contents:    Title   Name    Date    Time    Physician: Jasen Yepez MD 12/1/2022 10:55 AM    Case Mgmt:  Katie Lynne 11/30/2022 1313    OT: Ignacio Jara Angelic Gardner, OTR/L #[id number] 11/30/22 1529    PT: Madie Garrett, PT 11/30/22, 1634    RN: Sloane Braxton RN    ST:    Dietician: John Pascual, ANDREIA 11/30/2022  14:11     ADMIT DATE:11/29/2022

## 2022-11-29 NOTE — DISCHARGE SUMMARY
V2.0  Discharge Summary    Name:  Ascencion Choi /Age/Sex: 1970 (47 y.o. male)   Admit Date: 2022  Discharge Date: 22    MRN & CSN:  [id number] & [id number] Encounter Date and Time 22 2:32 PM EST    Attending:  Shanita Ashley MD Discharging Provider: Shanita Ashley MD       Hospital Course:     Brief HPI: Ascencion Choi is a 47 y.o. male with pmh of alcoholic liver cirrhosis, history of variceal bleed in , chronic thrombocytopenia chronic tobacco use disorder who presents with Tibial plateau fracture, left, closed, initial encounter    Brief Problem Based Course:     Acute fracture of the lateral tibial plateau: Orthopedic on board s/p  Open reduction internal fixation of left lateral tibial plateau fracture using AAP 5-hole proximal tibial locking plate with 15 mL of cancellous bone graft. Review underlying history of cirrhosis. On appropriate pain regimen. PT OT recommended ARU  Alcoholic liver disease with underlying history of TIPS in , history of variceal bleed 2021: Currently on Lasix per lactone and lactulose. Continue to monitor creatinine levels to titrate diuretics. Monitor for ascites. Chronic thrombocytopenia in the setting of cirrhosis: Continue to monitor  History of GI bleed variceal bleed 2021  Chronic tobacco use disorder      The patient expressed appropriate understanding of, and agreement with the discharge recommendations, medications, and plan.      Consults this admission:  IP CONSULT TO ORTHOPEDIC SURGERY    Discharge Diagnosis:   Tibial plateau fracture, left, closed, initial encounter    Discharge Instruction:   Follow up appointments:   Primary care physician: Akira Pearson MD within 2 weeks  Diet: regular diet   Activity: activity as tolerated  Disposition: Discharged to:   []Home, []HHC, []SNF, [x]Acute Rehab, []Hospice   Condition on discharge: Stable  Labs and Tests to be Followed up as an outpatient by PCP or Specialist:     Discharge Medications:        Medication List        ASK your doctor about these medications      albuterol sulfate  (90 Base) MCG/ACT inhaler  Commonly known as: PROVENTIL;VENTOLIN;PROAIR     furosemide 40 MG tablet  Commonly known as: LASIX     lactulose 10 GM/15ML solution  Commonly known as: CHRONULAC     spironolactone 100 MG tablet  Commonly known as: ALDACTONE             Objective Findings at Discharge:   /68   Pulse 90   Temp 98.2 °F (36.8 °C) (Oral)   Resp 16   Ht 5' 5.5\" (1.664 m)   Wt 225 lb (102.1 kg)   SpO2 97%   BMI 36.87 kg/m²       Physical Exam:   Physical Exam  Vitals and nursing note reviewed. Constitutional:       Appearance: Normal appearance. He is normal weight. HENT:      Head: Normocephalic. Right Ear: Tympanic membrane normal.      Left Ear: Tympanic membrane normal.      Nose: Nose normal.      Mouth/Throat:      Mouth: Mucous membranes are moist.   Eyes:      Conjunctiva/sclera: Conjunctivae normal.      Pupils: Pupils are equal, round, and reactive to light. Cardiovascular:      Rate and Rhythm: Normal rate and regular rhythm. Pulses: Normal pulses. Heart sounds: Normal heart sounds. No murmur heard. Pulmonary:      Effort: Pulmonary effort is normal.      Breath sounds: Rales present. No wheezing or rhonchi. Abdominal:      General: Abdomen is flat. Bowel sounds are normal. There is distension. Palpations: Abdomen is soft. Tenderness: There is no abdominal tenderness. There is no guarding or rebound. Hernia: No hernia is present. Musculoskeletal:         General: No deformity. Normal range of motion. Cervical back: Normal range of motion and neck supple. Right lower leg: No edema. Left lower leg: No edema. Skin:     Coloration: Skin is not jaundiced or pale. Neurological:      General: No focal deficit present. Mental Status: He is alert and oriented to person, place, and time. Mental status is at baseline. Motor: Weakness present. Labs and Imaging   XR KNEE LEFT (3 VIEWS)    Result Date: 11/27/2022  EXAMINATION: THREE X-RAY VIEWS OF THE LEFT KNEE 11/25/2022 11:26 pm COMPARISON: None HISTORY: ORDERING SYSTEM PROVIDED HISTORY:  Fall, left knee pain TECHNOLOGIST PROVIDED HISTORY: Reason for Exam:  Fall, left knee pain Reason for Exam: Twisted knee FINDINGS: Three views of the left knee demonstrate an acute, slightly depressed fracture of the lateral tibial plateau. Findings are associated with of large lipohemarthrosis. Elsewhere, no acute fracture or dislocation. No significant degenerative changes. 1. Acute, slightly depressed fracture of the lateral tibial plateau. 2. Large lipohemarthrosis. CT KNEE LEFT WO CONTRAST    Result Date: 11/29/2022  EXAMINATION: CT OF THE LEFT KNEE WITHOUT CONTRAST 11/26/2022 2:45 pm TECHNIQUE: CT of the left knee was performed without the administration of intravenous contrast.  Multiplanar reformatted images are provided for review. Automated exposure control, iterative reconstruction, and/or weight based adjustment of the mA/kV was utilized to reduce the radiation dose to as low as reasonably achievable. COMPARISON: None HISTORY ORDERING SYSTEM PROVIDED HISTORY: Left lateral tibial plateau fracture, preop planning TECHNOLOGIST PROVIDED HISTORY: Reason for exam:->Left lateral tibial plateau fracture, preop planning Reason for Exam: Left lateral tibial plateau fracture, preop planning FINDINGS: Bones: Acute traumatic split depression type lateral tibial plateau fracture is present. The articular surface demonstrates depression in the lateral tibial plateau by approximately 9 mm. Vertically oriented component extends into the lateral tibial metaphysis. The fracture extends to the central tibial spine. No fracture extension into the medial tibial plateau. Distal femur, patella and proximal fibula are intact. Soft Tissue: Muscle bulk is maintained.   Soft tissue edema from the recent trauma. Joint: Large lipohemarthrosis. No significant degenerative changes. 1. Acute split depression type lateral tibial plateau fracture. FL LESS THAN 1 HOUR    Result Date: 11/28/2022  Radiology exam is complete. No Radiologist dictation. Please follow up with ordering provider. CBC:   Recent Labs     11/27/22 0447   WBC 6.7   HGB 12.7*        BMP:    Recent Labs     11/27/22 0447   *   K 3.6   CL 97*   CO2 27   BUN 13   CREATININE 0.6*   GLUCOSE 149*     Hepatic: No results for input(s): AST, ALT, ALB, BILITOT, ALKPHOS in the last 72 hours. Lipids: No results found for: CHOL, HDL, TRIG  Hemoglobin A1C: No results found for: LABA1C  TSH: No results found for: TSH  Troponin: No results found for: TROPONINT  Lactic Acid: No results for input(s): LACTA in the last 72 hours. BNP: No results for input(s): PROBNP in the last 72 hours.   UA:No results found for: Florance Fast, PHUR, LABCAST, WBCUA, RBCUA, MUCUS, TRICHOMONAS, YEAST, BACTERIA, CLARITYU, SPECGRAV, LEUKOCYTESUR, UROBILINOGEN, BILIRUBINUR, BLOODU, GLUCOSEU, KETUA, AMORPHOUS  Urine Cultures: No results found for: LABURIN  Blood Cultures: No results found for: BC  No results found for: BLOODCULT2  Organism: No results found for: ORG    Time Spent Discharging patient 35 minutes    Electronically signed by Bassam Mathis MD on 11/29/2022 at 2:32 PM

## 2022-11-29 NOTE — PROGRESS NOTES
Nuzhat Rocha is a 47 y.o. male patient. 1. Tibial plateau fracture, left, closed, initial encounter      Past Medical History:   Diagnosis Date    Alcoholic cirrhosis of liver (HCC)     Esophageal varices (HCC)     Neuropathy      No past surgical history pertinent negatives on file. Scheduled Meds:   sodium chloride flush  5-40 mL IntraVENous 2 times per day    ketorolac  30 mg IntraVENous Q6H    sodium chloride flush  5-40 mL IntraVENous 2 times per day    enoxaparin  30 mg SubCUTAneous BID    furosemide  60 mg Oral Daily    lactulose  30 g Oral BID    spironolactone  150 mg Oral Lunch    multivitamin  1 tablet Oral Daily     Continuous Infusions:   lactated ringers 125 mL/hr at 11/29/22 8408    sodium chloride       PRN Meds:sodium chloride flush, sodium chloride, oxyCODONE **OR** oxyCODONE, ondansetron **OR** ondansetron, bisacodyl, sodium chloride flush, ondansetron **OR** ondansetron, polyethylene glycol, acetaminophen **OR** acetaminophen, albuterol sulfate HFA, ibuprofen    Allergies   Allergen Reactions    Prednisone Swelling     Principal Problem:    Tibial plateau fracture, left, closed, initial encounter  Resolved Problems:    * No resolved hospital problems. *    Blood pressure 117/68, pulse 90, temperature 98.2 °F (36.8 °C), temperature source Oral, resp. rate 16, height 5' 5.5\" (1.664 m), weight 225 lb (102.1 kg), SpO2 97 %. Subjective  Patient seen and examined, resting in chair comfortably, pain controlled, no new complaints. Already up walking to the bathroom, doing well, feeling better today. Objective  LLE - Dressing removed, incision clean, dry, intact, no calf TTP, compartments soft, neurovascularly intact distally. Moderate knee effusion, normal postoperative swelling. Assessment & Plan  POD #1 ORIF left tibial plateau, Doing well postoperatively    Continue nonweightbearing on left lower extremity. Continue range of motion exercises for the left knee as tolerated.   Pain control. DVT prophylaxis. Continue to ice and elevate. Continue PT/OT. Discharge planning likely for ARU. Ortho stable for discharge, follow-up in office in 2 weeks.     Mamadou 97,   11/29/2022

## 2022-11-29 NOTE — ANESTHESIA POSTPROCEDURE EVALUATION
Department of Anesthesiology  Postprocedure Note    Patient: Semaj Marquez  MRN: [id number]  YOB: 1970  Date of evaluation: 11/29/2022      Procedure Summary     Date: 11/28/22 Room / Location: [address]    Anesthesia Start: 0247 Anesthesia Stop: 4936    Procedure: LEFT TIBIA OPEN REDUCTION INTERNAL FIXATION (Left: Leg Lower) Diagnosis:       Other closed fracture of proximal end of left tibia with malunion, subsequent encounter      (Other closed fracture of proximal end of left tibia with malunion, subsequent encounter [S82.192P])    Surgeons: Michael Saleh DO Responsible Provider: Avis Salgado MD    Anesthesia Type: general ASA Status: 4          Anesthesia Type: No value filed.     Malick Phase I: Malick Score: 10    Malick Phase II:        Anesthesia Post Evaluation    Patient location during evaluation: PACU  Patient participation: complete - patient participated  Level of consciousness: awake and alert  Pain score: 3  Airway patency: patent  Nausea & Vomiting: no nausea and no vomiting  Complications: no  Cardiovascular status: blood pressure returned to baseline  Respiratory status: acceptable  Hydration status: euvolemic

## 2022-11-29 NOTE — PROGRESS NOTES
V2.0  [facility] Hospitalist Progress Note      Name:  Wayne Granado /Age/Sex: 1970  (47 y.o. male)   MRN & CSN:  [id number] & [id number] Encounter Date/Time: 2022 8:31 AM EST    Location:  [address] [place] [address] PCP: Shawn Anders MD       Hospital Day: 5    Assessment and Plan:   Wayne Granado is a 47 y.o. male with pmh of alcoholic liver cirrhosis, history of variceal bleed in , chronic thrombocytopenia chronic tobacco use disorder who presents with Tibial plateau fracture, left, closed, initial encounter      Plan:  Acute fracture of the lateral tibial plateau: Orthopedic on board s/p  Open reduction internal fixation of left lateral tibial plateau fracture using AAP 5-hole proximal tibial locking plate with 15 mL of cancellous bone graft. Review underlying history of cirrhosis. On appropriate pain regimen. PT OT recommended ARU  Alcoholic liver disease with underlying history of TIPS in , history of variceal bleed 2021: Currently on Lasix per lactone and lactulose. Continue to monitor creatinine levels to titrate diuretics. Monitor for ascites. Chronic thrombocytopenia in the setting of cirrhosis: Continue to monitor  History of GI bleed variceal bleed 2021  Chronic tobacco use disorder    Diet ADULT DIET; Regular   DVT Prophylaxis [x] Lovenox, []  Heparin, [] SCDs, [] Ambulation,  [] Eliquis, [] Xarelto  [] Coumadin   Code Status Full Code   Disposition From: Continue home  Expected Disposition: Home  Estimated Date of Discharge: 2 to 3 days  Patient requires continued admission due placement to ARU   Surrogate Decision Maker/ POA      Subjective:     Chief Complaint: Knee Pain (L knee injury. Twisted knee after getting pant leg caught in a cage)     The patient was seen at bedside, worked with PT/OT, plan for ARU    Review of Systems:    Review of Systems   Constitutional:  Positive for unexpected weight change. Negative for appetite change, chills and fever.    HENT:  Negative for ear pain, hearing loss, sinus pressure, sinus pain and voice change. Eyes:  Negative for pain, discharge and visual disturbance. Respiratory:  Negative for cough, chest tightness and shortness of breath. Cardiovascular:  Negative for chest pain, palpitations and leg swelling. Gastrointestinal:  Positive for abdominal distention, abdominal pain, diarrhea and nausea. Negative for blood in stool and vomiting. Genitourinary:  Negative for dysuria and frequency. Musculoskeletal:  Positive for arthralgias and joint swelling. Negative for back pain. Neurological:  Positive for weakness. Negative for dizziness, light-headedness and headaches. Psychiatric/Behavioral:  Negative for sleep disturbance. Objective: Intake/Output Summary (Last 24 hours) at 11/29/2022 1154  Last data filed at 11/29/2022 0625  Gross per 24 hour   Intake 240 ml   Output 1750 ml   Net -1510 ml          Vitals:   Vitals:    11/29/22 0751   BP: 117/68   Pulse: 90   Resp: 16   Temp: 98.2 °F (36.8 °C)   SpO2: 97%       Physical Exam:   Physical Exam  Vitals and nursing note reviewed. Constitutional:       Appearance: Normal appearance. He is normal weight. HENT:      Head: Normocephalic. Right Ear: Tympanic membrane normal.      Left Ear: Tympanic membrane normal.      Nose: Nose normal.      Mouth/Throat:      Mouth: Mucous membranes are moist.   Eyes:      Conjunctiva/sclera: Conjunctivae normal.      Pupils: Pupils are equal, round, and reactive to light. Cardiovascular:      Rate and Rhythm: Normal rate and regular rhythm. Pulses: Normal pulses. Heart sounds: Normal heart sounds. No murmur heard. Pulmonary:      Effort: Pulmonary effort is normal.      Breath sounds: Rales present. No wheezing or rhonchi. Abdominal:      General: Abdomen is flat. Bowel sounds are normal. There is distension. Palpations: Abdomen is soft. Tenderness: There is no abdominal tenderness.  There is no guarding or slightly depressed fracture of the lateral tibial plateau. Findings are associated with of large lipohemarthrosis. Elsewhere, no acute fracture or dislocation. No significant degenerative changes. 1. Acute, slightly depressed fracture of the lateral tibial plateau. 2. Large lipohemarthrosis. CT KNEE LEFT WO CONTRAST    Result Date: 11/26/2022  EXAMINATION: CT OF THE LEFT KNEE WITHOUT CONTRAST 11/26/2022 2:45 pm TECHNIQUE: CT of the left knee was performed without the administration of intravenous contrast.  Multiplanar reformatted images are provided for review. Automated exposure control, iterative reconstruction, and/or weight based adjustment of the mA/kV was utilized to reduce the radiation dose to as low as reasonably achievable. COMPARISON: None HISTORY ORDERING SYSTEM PROVIDED HISTORY: Left lateral tibial plateau fracture, preop planning TECHNOLOGIST PROVIDED HISTORY: Reason for exam:->Left lateral tibial plateau fracture, preop planning Reason for Exam: Left lateral tibial plateau fracture, preop planning FINDINGS: Bones: Acute traumatic split depression type lateral tibial plateau fracture is present. The articular surface demonstrates depression in the lateral tibial plateau by approximately 9 mm. Vertically oriented component extends into the lateral tibial metaphysis. The fracture extends to the central tibial spine. No fracture extension into the medial tibial plateau. Distal femur, patella and proximal fibula are intact. Soft Tissue: Muscle bulk is maintained. Soft tissue edema from the recent trauma. Joint: Large lipohemarthrosis. No significant degenerative changes. 1. Acute split depression type lateral tibial plateau fracture.        Electronically signed by Zac Lazaro MD on 11/29/2022 at 11:54 AM

## 2022-11-29 NOTE — CONSULTS
[address] [facility] ACUTE CARE PHYSICAL THERAPY EVALUATION  Danita Leon, 1970, 1107/1107-A, 11/29/2022    History  [place]:  The encounter diagnosis was Tibial plateau fracture, left, closed, initial encounter. Patient  has a past medical history of Alcoholic cirrhosis of liver ([facility] [address] Esophageal varices ([facility] [address] and Neuropathy. Patient  has a past surgical history that includes Appendectomy; hernia repair; and Liver transplant. Subjective:  Patient states:  \"I would have help but all my family is on vacation. \"    Pain:  3/10 pain in L knee at sx site at rest.    Communication with other providers:  Handoff to RN, OT  Restrictions: NWB  L LE, general precautions, fall risk    Home Setup/Prior level of function  Social/Functional History  Lives With: Alone  Type of Home: House  Home Layout: One level  Home Access: Stairs to enter without rails  Entrance Stairs - Number of Steps: 1 (from front), 3+3 (through garage)  Entrance Stairs - Rails: None  Bathroom Shower/Tub: Tub/Shower unit  Bathroom Toilet: Standard  Bathroom Equipment: Shower chair, Grab bars in shower  Home Equipment: Cane, Crutches  ADL Assistance: [address] [place] [address] Avenue: Independent  Homemaking Responsibilities: Yes  Ambulation Assistance: Independent (uses cane in community)  Transfer Assistance: Independent  Active : Yes  Mode of Transportation: Truck  Occupation: Unemployed    Examination of body systems (includes body structures/functions, activity/participation limitations):  Observation:  Pt supine in bed upon arrival and agreeable to therapy  Vision:  Wears glasses  Hearing:  WFL  Cardiopulmonary:  no O2 needs  Cognition: WFL, see OT/SLP note for further evaluation. Musculoskeletal  ROM R LE WFL. Strength R LE:  4+/5, moderate impairment in function and endurance.   L LE NT due to sx  Neuro:  diminished sensation in bilateral feet secondary to neuropathy      Mobility:  Rolling L/R:  SBA  Supine to sit: SBA  Transfers: pt completed STS from EOB, to/from commode, and to/from chair CGA with cues for hand placement  Sitting balance:  good. Standing balance:  fair+, pt stood at sink during dynamic UE activities CGA with no LOB. Gait: pt ambulated 10' + 10' with RW with hop to gait pattern CGA with good maintenance of WB'ing precautions  Education: pt educated on [address] precautions, safety, discharge recommendation, hop to gait pattern with RW. Pt verbalized all understanding. Pt given leg lifted and educated on proper use. [facility] 6 Clicks Inpatient Mobility:  AM-PAC Inpatient Mobility Raw Score : 17    Safety: patient left in chair with alarm on, call light within reach, RN notified, gait belt used. Assessment:   Pt is a 47 y.o. male admitted to the hospital after a fall resulting in a left lateral depression tibial plateau fracture. Pt underwent an open reduction internal fixation of left lateral tibial plateau on 07/76/5720. Pt is typically independent with all ambulation and transfers without a device. Pt is currently performing bed mobility SBA, transferring CGA, and ambulating 10' with RW CGA. Pt is presenting with decreased endurance, impaired ROM, decreased strength, impaired bed mobility, impaired transfers, and impaired gait. Pt would benefit from continued acute care PT as well as ARU placement upon discharge to continue to address impairments. Complexity: moderate    Prognosis: Good, no significant barriers to participation at this time.      General Plan: 6-7 times per week/week     Equipment: TBD at next level of care; if pt opts to dc home, will need RW    Goals:  Short Term Goals  Time Frame for Short Term Goals: 1 week  Short Term Goal 1: pt to complete all bed mobility mod I  Short Term Goal 2: Pt to complete all STS transfers to/from bed, commode, and chair mod I  Short Term Goal 3: Pt to ambulate 48' with LRAD SBA  Short Term Goal 4: Pt to propel manual ' with SBA  Short Term Goal 5: Pt to ascend/descend 1 curb step without HR to simulate home set up       Treatment plan:  Bed mobility, transfers, balance, gait, TA, TX    Recommendations for NURSING mobility: amb with gait belt and RW NWB L LE    Time:   Time in: 0922  Time out: 0957  Timed treatment minutes: 25  Total time: 35    Electronically signed by:    Ruben Hackett PT  11/29/2022, 11:44 AM

## 2022-11-29 NOTE — PROGRESS NOTES
ARU Admission Assessment      A Complete drug regimen review was completed for this patient this date. [x]  No clinically significant medication issue was identified   []  Yes, a clinically significant medication issue was identified     []  Adverse Drug Event:    []  Allergy:    []  Side Effect:    []  Ineffective Therapy:    []  Drug interaction:     []  Duplicate Therapy:    []  Untreated Indication:    []  Non-adherence:    []  Other:  Nursing contacted the physician:       Date:                Time:    Actions recommended by physician were completed:   Date:                 Time:  Action(s) Taken:             []  New Physician Order Received    []  Issue Noted by Physician; However No Action Required    []  Other:        Ethnicity  \"Are you of , /a, or Dominican origin? \"  Check all that apply:  [x] A. No, not of , /a, or [place] Origin  [] B.  Yes, [place] American, Chicano/a  [] C.  Yes, [address] [place] [address]  [] D.  Yes, [place]  [] E.  Yes, another , , or Dominican origin  [] X. Patient unable to respond    Race  \"What is your race? \"  Check all that apply:  [x] A. White  [] B. Black or   [] C. American [place] [facility] or [place] Native  [] D.  [place] [facility]  [] E. [place]  [] F. Italian  [] G. [place]  [] Char Mantle  [] I. [place]  [] J.  Other   [] K.   [] L. Algerian or [place]  [] M. [place]  [] N. Other [facility]  [] X. Patient unable to respond    Language  A. \"What is your preferred language? \"   English    B. \"Do you need or want an  to communicate with a doctor or health care staff? \"  Check only one:  [x] 0. No  [] 1. Yes  [] 9. Unable to determine    Transportation  \"In the past 6-12 months, has lack of transportation kept you from medical appointments, meetings, work, or from getting things needed for daily living? \"  Check all that apply:  [] A.  Yes, it has kept me from medical appointments or from getting my medications  [] B.  Yes, it has kept me from non-medical meetings, appointments, work, or from getting things that I need  [x] C.  No  [] X. Patient unable to respond  [] Y. Patient declines to respond    Hearing  Ability to hear (with hearing aid or hearing appliances if normally used)  [x]  0. Adequate - no difficulty in normal conversation, social interaction, listening to TV  []  1. Minimal difficulty - difficulty in some environments (e.g. when person speaks softly or setting is noisy)  []  2. Moderate difficulty - speaker has to increase volume and speak distinctly   []  3. Highly impaired - absence of useful hearing    Vision  Ability to see in adequate light (with glasses or other visual appliances)  [x]  0. Adequate - sees fine detail, such as regular print in newspapers/books  []  1. Impaired - sees large print, but not regular print in newspapers/books  []  2. Moderately impaired - limited vision; not able to see newspaper headlines but can identify objects  []  3. Highly impaired - object identification in question, but eyes appear to follow objects  []  4. Severely impaired - no vision or sees only light, colors, or shapes; eyes do not appear to follow objects    Health Literacy  \"How often do you need to have someone help you when you read instructions, pamphlets, or other written material from your doctor or pharmacy? \"  [x]  0. Never  []  1. Rarely  []  2. Sometimes  []  3. Often  []  4. Always  []  8. Patient unable to respond    Signs and Symptoms of Delirium  A. Acute Onset Mental Status Change - Is there evidence of an acute change in mental status from the patient's baseline? [x] 0. No  [] 1. Yes    B. Inattention - Did the patient have difficulty focusing attention, for example being easily distractible or having difficulty keeping track of what was being said?  []  0. Behavior not present  [x]  1. Behavior continuously present, does not fluctuate  []  2. Behavior present, fluctuates (comes and goes, changes in severity)    C. Disorganized thinking - Was the patient's thinking disorganized or incoherent (rambling or irrelevant conversation, unclear or illogical flow of ideas, or unpredictable switching from subject to subject)? []  0. Behavior not present  [x]  1. Behavior continuously present, does not fluctuate  []  2. Behavior present, fluctuates (comes and goes, changes in severity)    D. Altered level of consciousness - Did the patient have altered level of consciousness as indicated by any of the following criteria? Vigilant - startled easily to any sound or touch  Lethargic - repeatedly dozed off while being asked questions, but responded to voice or touch  Stuporous - very difficulty to arouse and keep aroused for the interview  Comatose - could not be aroused  [x]  0. Behavior not present  []  1. Behavior continuously present, does not fluctuate  []  2. Behavior present, fluctuates (comes and goes, changes in severity)    Mood    \"Over the last 2 weeks, have you been bothered by any of the following problems?\" 1. Symptom Presence    0 = No  1 = Yes  9 = No Response 2. Symptom Frequency    0 = Never or 1 day  1 = 2-6 days (several days)  2 = 7-11 days (half or more of the days)  3 = 12-14 days (nearly every day)  **Leave blank if 'No Reponse'**      Enter scores in boxes    Column 1 Column 2   Little interest or pleasure in doing things   [x] 0. No  [] 1. Yes  [] 9. No Response [] 0. Never or one day  [] 1.  2-6 days (several days)  [] 2.  7-11 days (half or more of days)  [] 3.  12-14 days (nearly every day)     Feeling down, depressed, or hopeless   [x] 0. No  [] 1. Yes  [] 9. No Response [] 0. Never or one day  [] 1.  2-6 days (several days)  [] 2.  7-11 days (half or more of days)  [] 3.  12-14 days (nearly every day)   **If Question A or B in column 2 is coded 2 or 3, CONTINUE asking the questions below in box.   If not, SKIP down to \"social isolation\" question and continue**     Trouble falling or staying asleep, or sleeping too much   [] 0. No  [] 1. Yes  [] 9. No Response [] 0. Never or one day  [] 1.  2-6 days (several days)  [] 2.  7-11 days (half or more of days)  [] 3.  12-14 days (nearly every day   Feeling tired or having little energy   [] 0. No  [] 1. Yes  [] 9. No Response [] 0. Never or one day  [] 1.  2-6 days (several days)  [] 2.  7-11 days (half or more of days)  [] 3.  12-14 days (nearly every day   Poor appetite or overeating     [] 0. No  [] 1. Yes  [] 9. No Response [] 0. Never or one day  [] 1.  2-6 days (several days)  [] 2.  7-11 days (half or more of days)  [] 3.  12-14 days (nearly every day   Feeling bad about yourself - or that you are a failure or have let yourself or your family down   [] 0. No  [] 1. Yes  [] 9. No Response [] 0. Never or one day  [] 1.  2-6 days (several days)  [] 2.  7-11 days (half or more of days)  [] 3.  12-14 days (nearly every day   Trouble concentrating on things, such as reading the newspaper or watching television   [] 0. No  [] 1. Yes  [] 9. No Response [] 0. Never or one day  [] 1.  2-6 days (several days)  [] 2.  7-11 days (half or more of days)  [] 3.  12-14 days (nearly every day   Moving or speaking so slowly that other people could have noticed. Or the opposite- being so fidgety or restless that you have been moving around a lot more than usual.   [] 0. No  [] 1. Yes  [] 9. No Response [] 0. Never or one day  [] 1.  2-6 days (several days)  [] 2.  7-11 days (half or more of days)  [] 3.  12-14 days (nearly every day   Thoughts that you would be better off dead, or of hurting yourself in some way.   [] 0. No  [] 1. Yes  [] 9. No Response [] 0. Never or one day  [] 1.  2-6 days (several days)  [] 2.  7-11 days (half or more of days)  [] 3.  12-14 days (nearly every day     Social Isolation  \"How often do you feel lonely or isolated from those around you? \"  [x] 0. Never  [] 1. Rarely  [] 2. Sometimes  [] 3. Often  [] 4. Always  [] 8. Patient unable to respond    Pain Effect on Sleep  \"Over the past 5 days, how much of the time has pain made it hard for you to sleep at night? \"  []  0. Does not apply - I have not had any pain or hurting in the past 5 days  []  1. Rarely or not at all  [x]  2. Occasionally  []  3. Frequently  []  4. Almost constantly  []  8. Unable to answer  **If the patient answers \"0. Does not apply\" to this question, skip the next two \"Pain\" questions**      Pain Interference with Therapy Activities  \"Over the past 5 days, how often have you limited your participation in rehabilitation therapy sessions due to pain? \"  []  0. Does not apply - I have not received rehabilitation therapy in the past 5 days  []  1. Rarely or not at all  [x]  2. Occasionally  []  3. Frequently  []  4. Almost constantly  []  8. Unable to answer    Pain Interference with Day-to-Day Activities: \"Over the past 5 days, how often have you limited your day-to-day activities (excluding rehabilitation therapy session)? \"  []  1. Rarely or not at all  []  2. Occasionally  [x]  3. Frequently  []  4. Almost constantly  []  8. Unable to answer    Nutritional Approaches  Check all of the following nutritional approaches that apply on admission:  []  A. Parenteral/IV feeding  []  B. Feeding tube (e.g., nasogastric or abdominal (PEG))  []  C. Mechanically altered diet - requires change in texture of food or liquids (e.g., pureed food, thickened liquids)  [x]  D. Therapeutic diet (e.g., low salt, diabetic, low cholesterol)  []  Z. None of the above    High Risk Drug Classes:  Use and Indication    Is taking: Check if the pt is taking any medications by pharmacological classification, not how it is used, in the following classes  Indication noted:  If column 1 is checked, check if there is an indication noted for all meds in the drug class    * Meds must be ordered and/or taken on ARU still, not exclusively during acute stay Is taking  (check all that apply) Indication noted (check all that apply)   Antipsychotic [] []   Anticoagulant [] []   Antibiotic [] []   Opioid [x] [x]   Antiplatelet (aspirin DOES count-NEW) [x] [x]   Hypoglycemic (including insulin) [] []   None of the above []     Special Treatments, Procedures, and Programs    Check all of the following treatments, procedures, and programs that apply on admission. On admission (check all that apply)   Cancer Treatments   A1. Chemotherapy            A2. IV []           A3. Oral []           A10. Other []   B1. Radiation []   Respiratory Therapies   C1. Oxygen Therapy            C2. Continuous []           C3. Intermittent []           C4. High-concentration []   D1. Suctioning            D2. Scheduled []           D3. As needed []   E1. Tracheostomy Care []   F1. Invasive Mechanical Ventilator (ventilator or respirator) []   G1. Non-invasive Mechanical Ventilator []           G2. BiPAP []           G3. CPAP []   Other   H1. IV Medications            H2. Vasoactive medications      *ensure via IV only []           H3. Antibiotics           *ensure via IV only []           H4. Anticoagulation      *ensure via IV only []           H10. Other []   I1. Transfusions []   J1. Dialysis            J2. Hemodialysis []           J3. Peritoneal dialysis []   O1. IV access            O2. Peripheral [x]           O3. Midline []           O4.  Central (PICC, tunneled, port) []      None of the above (select if no Cancer, Respiratory, or Other boxes are checked) []

## 2022-11-30 ENCOUNTER — APPOINTMENT (OUTPATIENT)
Dept: ULTRASOUND IMAGING | Age: 52
DRG: 313 | End: 2022-11-30
Attending: PHYSICAL MEDICINE & REHABILITATION
Payer: COMMERCIAL

## 2022-11-30 PROBLEM — K70.9 ALCOHOLIC LIVER DISEASE (HCC): Status: ACTIVE | Noted: 2022-11-30

## 2022-11-30 PROBLEM — R53.1 GENERALIZED WEAKNESS: Status: ACTIVE | Noted: 2022-11-30

## 2022-11-30 PROBLEM — D69.6 THROMBOCYTOPENIA (HCC): Status: ACTIVE | Noted: 2022-11-30

## 2022-11-30 PROBLEM — G62.1 ALCOHOLIC PERIPHERAL NEUROPATHY (HCC): Status: ACTIVE | Noted: 2022-11-30

## 2022-11-30 PROBLEM — R26.9 GAIT DISTURBANCE: Status: ACTIVE | Noted: 2022-11-30

## 2022-11-30 PROBLEM — R52 UNCONTROLLED PAIN: Status: ACTIVE | Noted: 2022-11-30

## 2022-11-30 PROBLEM — K70.30 ALCOHOLIC CIRRHOSIS OF LIVER WITHOUT ASCITES (HCC): Status: ACTIVE | Noted: 2022-11-30

## 2022-11-30 PROCEDURE — 2580000003 HC RX 258: Performed by: STUDENT IN AN ORGANIZED HEALTH CARE EDUCATION/TRAINING PROGRAM

## 2022-11-30 PROCEDURE — 97116 GAIT TRAINING THERAPY: CPT

## 2022-11-30 PROCEDURE — 94761 N-INVAS EAR/PLS OXIMETRY MLT: CPT

## 2022-11-30 PROCEDURE — 97542 WHEELCHAIR MNGMENT TRAINING: CPT

## 2022-11-30 PROCEDURE — 94664 DEMO&/EVAL PT USE INHALER: CPT

## 2022-11-30 PROCEDURE — 99232 SBSQ HOSP IP/OBS MODERATE 35: CPT | Performed by: PHYSICAL MEDICINE & REHABILITATION

## 2022-11-30 PROCEDURE — 93971 EXTREMITY STUDY: CPT

## 2022-11-30 PROCEDURE — 94640 AIRWAY INHALATION TREATMENT: CPT

## 2022-11-30 PROCEDURE — 97166 OT EVAL MOD COMPLEX 45 MIN: CPT

## 2022-11-30 PROCEDURE — 97530 THERAPEUTIC ACTIVITIES: CPT

## 2022-11-30 PROCEDURE — 97162 PT EVAL MOD COMPLEX 30 MIN: CPT

## 2022-11-30 PROCEDURE — 1280000000 HC REHAB R&B

## 2022-11-30 PROCEDURE — 97535 SELF CARE MNGMENT TRAINING: CPT

## 2022-11-30 PROCEDURE — 6370000000 HC RX 637 (ALT 250 FOR IP): Performed by: STUDENT IN AN ORGANIZED HEALTH CARE EDUCATION/TRAINING PROGRAM

## 2022-11-30 PROCEDURE — 6360000002 HC RX W HCPCS: Performed by: STUDENT IN AN ORGANIZED HEALTH CARE EDUCATION/TRAINING PROGRAM

## 2022-11-30 RX ADMIN — FUROSEMIDE 60 MG: 40 TABLET ORAL at 10:35

## 2022-11-30 RX ADMIN — KETOROLAC TROMETHAMINE 30 MG: 30 INJECTION, SOLUTION INTRAMUSCULAR; INTRAVENOUS at 15:06

## 2022-11-30 RX ADMIN — SPIRONOLACTONE 150 MG: 50 TABLET ORAL at 12:20

## 2022-11-30 RX ADMIN — KETOROLAC TROMETHAMINE 30 MG: 30 INJECTION, SOLUTION INTRAMUSCULAR; INTRAVENOUS at 02:53

## 2022-11-30 RX ADMIN — OXYCODONE HYDROCHLORIDE 10 MG: 10 TABLET ORAL at 06:30

## 2022-11-30 RX ADMIN — LACTULOSE 30 G: 10 SOLUTION ORAL at 21:13

## 2022-11-30 RX ADMIN — SODIUM CHLORIDE, PRESERVATIVE FREE 10 ML: 5 INJECTION INTRAVENOUS at 10:42

## 2022-11-30 RX ADMIN — KETOROLAC TROMETHAMINE 30 MG: 30 INJECTION, SOLUTION INTRAMUSCULAR; INTRAVENOUS at 10:36

## 2022-11-30 RX ADMIN — ALBUTEROL SULFATE 2 PUFF: 90 AEROSOL, METERED RESPIRATORY (INHALATION) at 11:26

## 2022-11-30 RX ADMIN — OXYCODONE HYDROCHLORIDE 10 MG: 10 TABLET ORAL at 22:17

## 2022-11-30 RX ADMIN — ENOXAPARIN SODIUM 30 MG: 100 INJECTION SUBCUTANEOUS at 21:13

## 2022-11-30 RX ADMIN — LACTULOSE 30 G: 10 SOLUTION ORAL at 10:35

## 2022-11-30 RX ADMIN — ENOXAPARIN SODIUM 30 MG: 100 INJECTION SUBCUTANEOUS at 10:35

## 2022-11-30 RX ADMIN — Medication 1 TABLET: at 10:35

## 2022-11-30 ASSESSMENT — PAIN DESCRIPTION - DESCRIPTORS
DESCRIPTORS: ACHING;DISCOMFORT
DESCRIPTORS: ACHING;DISCOMFORT
DESCRIPTORS: CRAMPING
DESCRIPTORS: ACHING;DISCOMFORT

## 2022-11-30 ASSESSMENT — PAIN SCALES - GENERAL
PAINLEVEL_OUTOF10: 7
PAINLEVEL_OUTOF10: 3
PAINLEVEL_OUTOF10: 0
PAINLEVEL_OUTOF10: 0
PAINLEVEL_OUTOF10: 3
PAINLEVEL_OUTOF10: 8

## 2022-11-30 ASSESSMENT — PAIN DESCRIPTION - ORIENTATION
ORIENTATION: LEFT

## 2022-11-30 ASSESSMENT — PAIN - FUNCTIONAL ASSESSMENT
PAIN_FUNCTIONAL_ASSESSMENT: PREVENTS OR INTERFERES SOME ACTIVE ACTIVITIES AND ADLS

## 2022-11-30 ASSESSMENT — PAIN DESCRIPTION - LOCATION
LOCATION: LEG
LOCATION: LEG;KNEE

## 2022-11-30 ASSESSMENT — PAIN SCALES - WONG BAKER: WONGBAKER_NUMERICALRESPONSE: 0

## 2022-11-30 NOTE — CARE COORDINATION
Case Management Admission Note      Patient:Sonny Montiel      TVQ:[birth year]  [facility]:[id number]  Rehab Dx/Hx: Closed fracture of lateral portion of left tibial plateau, initial encounter [S89.122A]    Chief Complaint:   Past Medical History:   Diagnosis Date    Alcoholic cirrhosis of liver (HCC)     Esophageal varices ([facility] [address]     Neuropathy      Past Surgical History:   Procedure Laterality Date    APPENDECTOMY      HERNIA REPAIR      LIVER TRANSPLANT      liver stent     Allergies   Allergen Reactions    Prednisone Swelling     Precautions: falls, infections, and non weight bearing    Date of Admit: 11/29/2022  Room #: 1011/1011-A      Current functional status at time of admit:        Home Living/DME Available:      Type of Home: House  Home Access: Stairs to enter without rails  Bathroom Shower/Tub: Tub/Shower unit  Bathroom Toilet: Standard  Bathroom Equipment: Shower chair, Grab bars in shower  Home Equipment: Bond Althea, Crutches       IADL Hx:   Homemaking Responsibilities: Yes  Active : Yes  Mode of Transportation: Truck  Occupation: On disability          Spouse:    Family:  local supportive sister and mother    Comments:  patient plans dc home alone. He reports his mother and sister are supportive. He's already problem solving how he'll complete iADLs. If continued therapy is recommended, he'd prefer [facility]HC. He will likely need a RW. Case mgt educated patient on additional benefits provided by his [employer] insurance.     Tuan Joshi, 11/30/2022, 1:13 PM

## 2022-11-30 NOTE — PROGRESS NOTES
Comprehensive Nutrition Assessment    Type and Reason for Visit:  Initial, Consult (oral nutrition supplement)    Nutrition Recommendations/Plan:   Continue current diet, no added salt   Encourage consistent meal intake  May offer oral nutrition supplement during stay as needed  Will continue to follow up during stay      Malnutrition Assessment:  Malnutrition Status:  Insufficient data (11/30/22 9661)    Context:  Acute Illness       Nutrition Assessment:    Admit to acute rehab unit with hx fall with fracture now s/p ORIF left tibia. Currently on no added salt diet. Limited recent po data, has been consuming % during hospital stay. Will follow at moderate nutrition risk at this time with increased needs, limited po data. Nutrition Related Findings:    working with therapy on visit, hx liver cirrhosis related to alcohol use,  meds noted: lactulose, multivitamin, lasix Wound Type: Surgical Incision       Current Nutrition Intake & Therapies:    Average Meal Intake: Unable to assess  Average Supplements Intake: None Ordered  ADULT DIET; Regular; No Added Salt (3-4 gm)    Anthropometric Measures:  Height: 5' 5\" (165.1 cm)  Ideal Body Weight (IBW): 136 lbs (62 kg)       Current Body Weight: 239 lb 3.2 oz (108.5 kg), 175.9 % IBW.  Weight Source: Bed Scale  Current BMI (kg/m2): 39.8  Usual Body Weight: 186 lb (84.4 kg) (hx MD office in January)  % Weight Change (Calculated): 28.6  Weight Adjustment For: No Adjustment                 BMI Categories: Obese Class 2 (BMI 35.0 -39.9)    Estimated Daily Nutrient Needs:  Energy Requirements Based On: Formula  Weight Used for Energy Requirements: Current  Energy (kcal/day): 2407-9312  Weight Used for Protein Requirements: Ideal  Protein (g/day): 74-86 (1.2-1.4 g/kg)  Method Used for Fluid Requirements: 1 ml/kcal  Fluid (ml/day): 2100    Nutrition Diagnosis:   Increased nutrient needs related to acute injury/trauma as evidenced by other (comment) (healing tibia fracture s/p ORIF)    Nutrition Interventions:   Food and/or Nutrient Delivery: Continue Current Diet, Start Oral Nutrition Supplement  Nutrition Education/Counseling: No recommendation at this time  Coordination of Nutrition Care: Continue to monitor while inpatient       Goals:     Goals: PO intake 75% or greater, by next RD assessment       Nutrition Monitoring and Evaluation:   Behavioral-Environmental Outcomes: None Identified  Food/Nutrient Intake Outcomes: Food and Nutrient Intake  Physical Signs/Symptoms Outcomes: Biochemical Data, Meal Time Behavior, Skin, Weight    Discharge Planning:    Continue current diet     Soniya Castaneda RD, LD  Contact: [phone]

## 2022-11-30 NOTE — PROGRESS NOTES
Occupational Therapy                              [facility] ARU OCCUPATIONAL THERAPY EVALUATION    Chart Review:  Past Medical History:   Diagnosis Date    Alcoholic cirrhosis of liver (HCC)     Esophageal varices ([address]     Neuropathy      Past Surgical History:   Procedure Laterality Date    APPENDECTOMY      HERNIA REPAIR      LIVER TRANSPLANT      liver stent     Social History:  Social/Functional History  Lives With: Alone  Type of Home: House  Home Layout: One level  Home Access: Stairs to enter without rails  Entrance Stairs - Number of Steps: 1 (from front)would have to walk through grass, 2 steps through garage  Entrance Stairs - Rails: None  Bathroom Shower/Tub: Tub/Shower unit  Bathroom Toilet: Standard  Bathroom Equipment: Shower chair, Grab bars in shower  Bathroom Accessibility:  (pt unsure if bathroom big enough for walker)  Home Equipment: [employer]  Has the patient had two or more falls in the past year or any fall with injury in the past year?: Yes (Pt has fallen 2-3 times in past year, this fall only one with injury)  Receives Help From: Family (pt needed assist with cleaning and anything requiring prolonged standing or activity tolerance due to B foot pain and decreased endurance)  ADL Assistance: Independent  Homemaking Assistance: Needs assistance  Homemaking Responsibilities: Yes  Meal Prep Responsibility: Primary  Laundry Responsibility: Secondary (family primarily does)  Cleaning Responsibility: Secondary (pt only performs light cleaning occasionally, family performs the rest)  Bill Paying/Finance Responsibility: Primary  Shopping Responsibility: Primary  Health Care Management: Primary  Ambulation Assistance: Independent (no device in house or to go to mailbox (400' round trip))  Transfer Assistance: Independent  Active : Yes  Mode of Transportation: Truck  Occupation: On disability  Type of Occupation: Pt on disability due to cirrhosis  Additional Comments: pt admits he only cleans up himself or house when he absolutely has to at baseline. sleeps in regular flat bed, but cannot sleep well on back    Restrictions:  Restrictions/Precautions  Restrictions/Precautions: General Precautions, Weight Bearing, Fall Risk (LLE NWB, order in for immobilzer, but no order for wearing one)  Lower Extremity Weight Bearing Restrictions  Left Lower Extremity Weight Bearing: Non Weight Bearing         Pain Level: 3  Pain Location: Leg    IRF-Hearing and Speech: Hearing, Speech, and Vision  Expression of Ideas and Wants: Without difficulty  Understanding Verbal and Non-Verbal Content: Understands  IRF-COG:  Cognitive Patterns  Cognitive Pattern Assessment Used: BIMS  Repetition of Three Words (First Attempt): 3  Temporal Orientation: Year: Correct  Temporal Orientation: Month: Accurate within 5 days  Temporal Orientation: Day: Correct  Able to recall \"sock: Yes, no cue required  Able to recall \"blue\": Yes, no cue required  Able to recall \"bed\": Yes, no cue required  BIMS Summary Score: 15  IRF-CAM (Confusion Assessment Method): Confusion Assessment Method (CAM)  Is there evidence of an acute change in mental status from the patient's baseline?: Yes  Inattention: Behavior present, fluctuates (comes and goes, changes in severity) (Pt states he has attention issues at baseline, however the \"high stimulating environment\" at the hospital has made it worse)  Disorganized thinking: Behavior present, fluctuates (comes and goes, changes in severity) (Pt states he has disorganized thinking at baseline, however the \"high stimulating environment\" at the hospital has made it worse)  Altered level of consciousness: Behavior not present    Objective:  Observation/Palpation  Observation: Pt sitting up in bed upon arrival stating his lunch just arrived late with therapist reporting will return at a later time. Upon entrance for second attempt, Pt in high atkins's, pleasant and agreeable to therapy.  Pt does require cues at times to redirect. Vision  Vision Exceptions: Wears glasses for reading     Hearing  Hearing: Exceptions to [facility] (tinnitus)    ROM:      LUE AROM (degrees)  LUE AROM : WFL     Left Hand AROM (degrees)  Left Hand AROM: WFL     RUE AROM (degrees)  RUE AROM : WFL     Right Hand AROM (degrees)  Right Hand AROM: WFL    Strength:    LUE Strength  Gross LUE Strength: Exceptions to [facility]  L Shoulder Flex: 4+/5  L Hand General: 4/5  LUE Strength Comment: 4+/5 grossly  RUE Strength  Gross RUE Strength: Exceptions to [facility]  R Shoulder Flex: 4+/5  R Hand General: 4/5  RUE Strength Comment: 4+/5 grossly    Quality of Movement: Tone RUE  RUE Tone: Normotonic  Tone LUE  LUE Tone: Normotonic  Coordination  Movements Are Fluid And Coordinated: Yes  Coordination and Movement Description: Fine motor impairments  Quality of Movement Other  Comment: Pt with severe neuropathy in hands causing numbness/difficulty grasping and completing [address] tasks. Sensation:    Sensation  Overall Sensation Status: Impaired  Light Touch: Partial deficits in the LUE, Partial deficits in the RUE, Severe deficits in the RLE, Severe deficits in the LLE (pt reports this is chronic from ammonia damage)     ADLs:    Eating: Eating  Assistance Needed: Setup or clean-up assistance  Comment: Pt reports occasional assist opening containers, able to feed self IND. CARE Score: 5  Discharge Goal: Independent       Oral Hygiene: Oral Hygiene  Assistance Needed: Setup or clean-up assistance  Comment: Setup assist seated sinkside. CARE Score: 5  Discharge Goal: Independent    UB/LB Bathing: Shower/Bathe Self  Assistance Needed: Partial/moderate assistance  Comment: Pt completed full shower, seated for entirety, weight shifting to wash rear. Pt requires assist washing distal LLE. CARE Score: 3  Discharge Goal: Independent    UB Dressing: Upper Body Dressing  Assistance Needed: Supervision or touching assistance  Comment: Sup to don pullover shirt.   CARE Score: 4  Discharge Goal: Independent         LB Dressing:   Lower Body Dressing  Assistance Needed: Supervision or touching assistance  Comment: Pt able to thread BLEs into depends with use of reacher, able to manage over hips with CGA. CARE Score: 4  Discharge Goal: Independent    Donning and [place] Footwear: Putting On/Taking Off Footwear  Assistance Needed: Partial/moderate assistance  Comment: Pt able to doff/don sock and shoe on R foot, however unable to complete on L foot. CARE Score: 3  Discharge Goal: Independent      Toiletin [address] needed: Supervision or touching assistance  Comment: CGA in stance to perform clothing management and bowel hygiene as Pt shaky balancing self on the R foot following NWB precautions on L. CARE Score: 4  Discharge Goal: Independent      Bed Mobility:    Bed mobility  Supine to Sit: Supervision  Sit to Supine: Supervision  Scooting: Supervision    Transfers:    Transfers  Stand Pivot Transfers: Contact guard assistance  Sit to stand: Contact guard assistance  Stand to sit: Contact guard assistance     Shower Transfers  Shower - Transfer From: Atlee Borer - Transfer Type: To and From  Shower - Transfer To: Shower seat without back  Shower - Technique: Ambulating  Shower Transfers: Contact Guard     Toilet Transfer  Assistance needed: Supervision or touching assistance  Comment: CGA with use of 2ww and Max verbal cues for safety as Pt impulsive with urgency. CARE Score: 4  Discharge Goal: Independent    Functional Mobility:    Balance  Sitting Balance: Independent  Standing Balance: Contact guard assistance  Standing Balance  Time: multiple 2-3 min stands  Activity: ADL  Functional Mobility  Functional - Mobility Device: Rolling Walker  Activity: To/from bathroom  Assist Level: Contact guard assistance  Functional Mobility Comments: Pt hops with RLE d/t NWB LLE precaution.      Cognition:  Cognition  Overall Cognitive Status: Exceptions  Arousal/Alertness: Appropriate responses to stimuli  Following Commands: Follows all commands without difficulty  Attention Span: Difficulty dividing attention  Memory: Appears intact  Safety Judgement: Good awareness of safety precautions    Perception:  Perception  Overall Perceptual Status: Impaired (Pt reports mild depth perception at baseline.)      Assessment:     Performance deficits / Impairments: Decreased functional mobility , Decreased strength, Decreased endurance, Decreased coordination, Decreased ADL status, Decreased safe awareness, Decreased sensation, Decreased high-level IADLs, Decreased ROM, Decreased balance, Decreased fine motor control    The patient is a 47year old male with a hx of alcoholic liver cirrhosis, alcoholic hepatitis, thrombocytopenia, and neuropathy who was admitted onto ARU after hospitalization for mechanical fall resulting in LLE fx. Pt had reported that he was bending over to lift an animal cage with the cage catching the stairs as well as his sweatpants causing him to trip and fall on the cage. Pt denied hitting his head or losing consciousness. X-ray revealed L knee-lateral tibial plateau fx. On 11/28/22 Pt underwent L tibia ORIF. Per ortho, Pt to remain NWB LLE. PTA, Pt living alone in a one story home alone completing all ADLs and meal prep independently. Pt's family typically comes over and completes the rest of Pt's IADLs as Pt suffers from pain in B feet with decreased standing tolerance. Today, Pt primarily was CGA for ADLs with the exception of bathing and footwear where Pt needed Min A d/t decreased LLE ROM. Pt reports Giovanna Hy now with full ROM since being in the hospital on pain medications, however at home they have been limited. The QI, MMT, and ROM standardized assessments were used this date to determine the above performance deficits, which compromise pt's ability to safely complete ADLs/IADLs/mobility.   Pt will benefit from ARU OT services to increase functional performance and return to PLOF. Decision Making: Medium Complexity  Clinical Presentation:  Pt presents to this ARU with deficits including functional balance, endurance, strength, sensation, FMC, and ADLs. Patient education:   ARU procotol, Role of O.T., O.T. plan of care  []   Patient goal was established and reviewed in Rehabtracker with patient and/or family this date. REQUIRES OT FOLLOW-UP: Yes  Discharge Recommendations:  Home alone with assist prn, HHC OT. Equipment Recommendations:  Pt has a shower chair, but reports he may want to look into TTB pending progress. Goals:     Short Term Goals  Time Frame for Short Term Goals: STGs=LTGs  Long Term Goals  Time Frame for Long Term Goals : 7-10 days or until d/c. Long Term Goal 1: Pt will complete self feeding with Mod I.  Long Term Goal 2: Pt will complete oral hygiene and grooming tasks with Mod I.  Long Term Goal 3: Pt will complete total body bathing with AE PRN and Mod I.  Long Term Goal 4: Pt will complete UB dressing with IND. Long Term Goal 5: Pt will complete LB dressing with AE PRN and Mod I. Additional Goals?: Yes  Long Term Goal 6: Pt will doff/don footwear with AE PRN and Mod I.  Long Term Goal 7: Pt will perform toileting with Mod I.  Long Term Goal 8: Pt will perform functional transfers (bed, chair, toilet, shower) with DME PRN and Mod I.  Long Term Goal 9: Pt will perform therex/therax to facilitate an increase in strength/endurance (with emphasis on dynamic standing balance/tolerance > 8 mins) with Mod I.  Long Term Goal 10:  Pt will perform meal prep with Mod I.    Plan:    Pt will be seen at least 60 minutes per day for a minimum of 5 days per week, plus group therapy as appropriate  Current Treatment Recommendations: Strengthening, ROM, Balance training, Functional mobility training, Endurance training, Pain management, Safety education & training, Patient/Caregiver education & training, Equipment evaluation, education, & procurement, Positioning, Self-Care / ADL, Home management training, Coordination training    OT Individual Minutes  Time In: 7820  Time Out: 6365  Minutes: 85                Number of Minutes/Billable Intervention      OT Evaluation 25   Therapeutic Exercise    ADL Self-care 60   Neuro Re-Ed    Therapeutic Activity    Group    Other:    TOTAL 85     Electronically signed by:    SUNSHINE De La Cruz, OTR/L #[id number]    11/30/2022, 3:16 PM

## 2022-11-30 NOTE — PROGRESS NOTES
Patient instructed and educated on mVisum. Patient able to do 2000 ml. Vital capacity. Patient's goal is 2750ml.  Electronically signed by Maddy Garcia RCP on 11/30/2022 at 11:23 AM There is not any good data to suggest that a low CHO diet will cause a change in the T3 level, ok to proceed with low CHO diet.   We usually recommend decreasing carbohydrate intake to 135gm per day and I suspect she needs to decrease the calorie amount fur

## 2022-11-30 NOTE — PATIENT CARE CONFERENCE
ACUTE REHAB TEAM CONFERENCE SUMMARY   [facility]    NAME: Marie Barnes  : 1970 ADMIT DATE: 2022    Rehab Admitting Dx:Closed fracture of lateral portion of left tibial plateau, initial encounter [S82.122A]  Patient Comorbid Conditions: Active Hospital Problems    Diagnosis Date Noted    Uncontrolled pain [R52] 2022     Priority: Medium    Generalized weakness [R53.1] 2022     Priority: Medium    Gait disturbance [R26.9] 2022     Priority: Medium    Alcoholic liver disease ([address] [K70.9] 2022     Priority: Medium    Alcoholic cirrhosis of liver without ascites ([address] [K70.30] 2022     Priority: Medium    Thrombocytopenia ([address] [D69.6] 2022     Priority: Medium    Alcoholic peripheral neuropathy ([address] [G62.1] 2022     Priority: Medium    Closed fracture of lateral portion of left tibial plateau, initial encounter Linvamsi Stairs 2022     Priority: Medium     Date: 2022    CASE MANAGEMENT  Current issues/needs regarding patient and family discharge status:   Patient plans dc 1-level home alone. 1 BRITTA. Friends & family for PRN support. PHYSICAL THERAPY (Updated in QI)  Short Term Goals  Time Frame for Short Term Goals: 7-10 days  Short Term Goal 1: pt to complete all bed mobility mod I  Short Term Goal 2: Pt to complete sit to stand to/from bed, commode, and car transfers with  mod I  Short Term Goal 3: Pt will ambulate 50' on level surfaces and 10' on unlevel surfaces with 2ww and NWB LLE with mod I, supervision for 150' on level surface  Short Term Goal 4: Pt will ascend/descend curb step with 2ww and 4 steps with rail with CGA  Short Term Goal 5: Pt will  object with reacher and 2ww with mod I  Additional Goals?: Yes  Short Term Goal 6: pt will propel mod I in w/c for >500'          Impairments/deficits, barriers:     Body Structures, Functions, Activity Limitations Requiring Skilled Therapeutic Intervention: Decreased functional mobility , Decreased ADL status, Decreased ROM, Decreased strength, Decreased safe awareness, Decreased endurance, Decreased sensation, Decreased balance, Decreased high-level IADLs, Decreased fine motor control, Increased pain        Decision Making: Medium Complexity  Clinical Presentation: evolving characteristics  Equipment needed at discharge: 2WW, manual w/c with elevating leg rests and anti-tippers; ramp      PT IRF-JUDE scores since initial assessment  Bed Mobility:   Roll Left and Right  Assistance Needed: Independent  Comment: no bed features, uses RLE to assist LLE  CARE Score: 6  Discharge Goal: Independent    Sit to Lying  Assistance Needed: Supervision or touching assistance  Comment: supervision with difficulty bringing LLE into bed  CARE Score: 4  Discharge Goal: Independent    Lying to Sitting on Side of Bed  Assistance Needed: Supervision or touching assistance  Comment: handed pt leg , cues for use  CARE Score: 4  Discharge Goal: Independent    Transfers:    Sit to Stand  Assistance Needed: Supervision or touching assistance  Comment: CGA using 2WW following NWB LLE  CARE Score: 4  Discharge Goal: Independent    Chair/Bed-to-Chair Transfer  Assistance Needed: Supervision or touching assistance  Comment: CGA using 2WW following NWB LLE  CARE Score: 4  Discharge Goal: Independent         Car Transfer  Assistance Needed: Supervision or touching assistance  Comment: CGA using 2WW following NWB LLE  Reason if not Attempted: Not attempted due to medical condition or safety concerns  CARE Score: 4  Discharge Goal: Independent    Ambulation:    Walking Ability  Does the Patient Walk?: Yes     Walk 10 Feet  Assistance Needed: Supervision or touching assistance  Comment: CGA using 2WW following NWB LLE  CARE Score: 4  Discharge Goal: Independent     Walk 50 Feet with Two Turns  Assistance Needed: Supervision or touching assistance  Comment: CG with 2ww, NWB LLE  CARE Score: 4  Discharge Goal: Independent     Walk 150 Feet  Comment: 54' max distance  Reason if not Attempted: Not attempted due to medical condition or safety concerns  CARE Score: 88  Discharge Goal: Supervision or touching assistance     Walking 10 Feet on Uneven Surfaces  Assistance Needed: Supervision or touching assistance  Comment: CGA using 2WW following NWB on LLE  Reason if not Attempted: Not attempted due to medical condition or safety concerns  CARE Score: 4  Discharge Goal: Independent     1 Step (Curb)  Comment: trained to ascend/descend 4\" curb step using // bars with Min A following NWB on LLE; pt reports increased fatigue and pain on LLE after 2 trials of this activity  Reason if not Attempted: Not attempted due to medical condition or safety concerns  CARE Score: 88  Discharge Goal: Supervision or touching assistance     4 Steps  Comment: feel pt may be able to attempt next session  Reason if not Attempted: Not attempted due to medical condition or safety concerns  CARE Score: 88  Discharge Goal: Supervision or touching assistance     12 Steps  Reason if not Attempted: Not attempted due to medical condition or safety concerns  CARE Score: 88  Discharge Goal: Not Attempted           Wheelchair:  w/c Ability: Wheelchair Ability  Uses a Wheelchair and/or Scooter?: Yes    Wheel 50 Feet with Two Turns  Assistance Needed: Independent  Comment: Mod Ind using BUE  CARE Score: 6  Discharge Goal: Independent    Wheel 150 Feet  Assistance Needed: Independent  Comment:  Mod Ind using BUE  CARE Score: 6  Discharge Goal: Independent              Balance:        Object: Picking Up Object  Assistance Needed: Supervision or touching assistance  Comment: supervision with 2ww and reacher  CARE Score: 4  Discharge Goal: Independent    Fall Risk: []  Yes  []  No    OCCUPATIONAL THERAPY  (Updated in QI)  Short Term Goals  Time Frame for Short Term Goals: STGs=LTGs :   Long Term Goals  Time Frame for Long Term Goals : 7-10 days or until d/c.  Long Term Goal 1: Pt will complete self feeding with Mod I.  Long Term Goal 2: Pt will complete oral hygiene and grooming tasks with Mod I.  Long Term Goal 3: Pt will complete total body bathing with AE PRN and Mod I.  Long Term Goal 4: Pt will complete UB dressing with IND. Long Term Goal 5: Pt will complete LB dressing with AE PRN and Mod I. Additional Goals?: Yes  Long Term Goal 6: Pt will doff/don footwear with AE PRN and Mod I.  Long Term Goal 7: Pt will perform toileting with Mod I.  Long Term Goal 8: Pt will perform functional transfers (bed, chair, toilet, shower) with DME PRN and Mod I.  Long Term Goal 9: Pt will perform therex/therax to facilitate an increase in strength/endurance (with emphasis on dynamic standing balance/tolerance > 8 mins) with Mod I.  Long Term Goal 10: Pt will perform meal prep with Mod I. :        OT IRF-JUDE scores and goals since initial assessment:    ADLs:    Eating: Eating  Assistance Needed: Setup or clean-up assistance  Comment: Pt reports occasional assist opening containers, able to feed self IND. CARE Score: 5  Discharge Goal: Independent       Oral Hygiene: Oral Hygiene  Assistance Needed: Setup or clean-up assistance  Comment: Setup assist seated sinkside. CARE Score: 5  Discharge Goal: Independent    Toiletin [address] needed: Supervision or touching assistance  Comment: CGA in stance to perform clothing management and bowel hygiene as Pt shaky balancing self on the R foot following NWB precautions on L. CARE Score: 4  Discharge Goal: Independent      UB/LB Bathing: Shower/Bathe Self  Assistance Needed: Partial/moderate assistance  Comment: Pt completed full shower, seated for entirety, weight shifting to wash rear. Pt requires assist washing distal LLE. CARE Score: 3  Discharge Goal: Independent    UB Dressing: Upper Body Dressing  Assistance Needed: Supervision or touching assistance  Comment: Sup to don pullover shirt.   CARE Score: 4  Discharge Goal: Independent         LB Dressing: Lower Body Dressing  Assistance Needed: Supervision or touching assistance  Comment: Pt able to thread BLEs into pajama pants with use of reacher for LLE and able to manage over hips in bed with supervision. CARE Score: 4  Discharge Goal: Independent    Donning and [place]age Footwear: Putting On/Taking Off Footwear  Assistance Needed: Supervision or touching assistance  Comment: Pt able to don sock and shoe on R foot using figure four method sitting EOB; Pt educated on use of sock aide and able to perform after demo on L foot. CARE Score: 4  Discharge Goal: Independent      Toilet Transfers: Toilet Transfer  Assistance needed: Supervision or touching assistance  Comment: CGA with use of 2ww and Max verbal cues for safety as Pt impulsive with urgency. CARE Score: 4  Discharge Goal: Independent      Impairments/deficits, barriers:  Functional balance, endurance, strength, FMC, pain, sensation, and ADLs/IADLs. Assessment  Performance deficits / Impairments: Decreased functional mobility , Decreased strength, Decreased endurance, Decreased coordination, Decreased ADL status, Decreased safe awareness, Decreased sensation, Decreased high-level IADLs, Decreased ROM, Decreased balance, Decreased fine motor control  Decision Making: Medium Complexity  Equipment needed at discharge:Pt already has needed equipment including shower chair but states he likes the idea of a TTB and may want to look into this option pending progress.       COGNITIVE FUNCTION/SPEECH THERAPY (AS INDICATED)  LTG                                               Nursing Current Medical Status:   [x] Is continent of bowel and bladder     [] Is incontinent of bowel and bladder    [x] Has had an adequate number of bowel movements   [] Urinates with no urinary retention >300ml in bladder   [] Targeting bladder protocol with rivera removal   [x] Maintaining O2 SATs at 92% or greater   [x] Has pain managed while on ARU         [x] Has had no skin breakdown while on ARU   [] Has improved skin integrity via wound measurements   [] Has no signs/symptoms of infection at the wound site   [] Pressure wounds Stage/Location:    [] Arrived on unit with pressure wound  [x] Has been free from injury during hospitalization   [] Has experienced a fall during hospitalization  [] Ongoing education with patient/family with understanding demonstrated for:  [] Receives IV Fluids  [] Other:        NUTRITION  Weight: 238 lb (108 kg) / Body mass index is 39.61 kg/m². Current diet: ADULT DIET; Regular; No Added Salt (3-4 gm)  Intake: Intake at some recent meals %      Medical improvements/barriers: NWB precautions, back issues , ultrasound - for DVT        Team goals for next treatment period/Intervention for current barriers:   [] Pt will increase activity tolerance for daily tasks. [] Pt will improve bed mobility with reduced assist.  [x] Pt will improve safety in fx tasks with reduced cues/assist  [] Pt will improve transfers with reduced assist  [] Pt will improve toileting with reduced assist  [x] Pt will improve ADL's with use of adaptive equipment with reduced assist  [] Pt will improve pain mgmt for maximum participation in tx program  [] Pt will improve communication to get basic needs met on unit  [] Pt will improve swallowing for safe diet advancement with use of strategies  []  Plan for discharge to home. Patient Strengths: Motivated, Cooperative, and Pleasant    Justification for Continued Stay  Based on my medical assessment of the patient and review of information from the interdisciplinary team as part of this weekly team conference, the patient continues to meet the following criteria for IRF level of care:   The patient requires active and ongoing intervention of multiple therapy disciplines  The patient requires and intensive rehabilitation therapy program  The patient requires continued physician supervision by a rehabilitation physician  The patient requires 24 hours rehab nursing care  The patient requires an intensive and coordinated interdisciplinary team approach to the delivery of rehabilitative care. Assessment/Plan   [x]  The patient is making good progression towards their long term goals and is actively participating in and has a reasonable expectation to continue to benefit from the intensive rehabilitation therapy program   []  The estimated discharge date has been changed from initial team conference due to:   []  The estimated discharge destination has been changed from initial team conference due to:         Ongoing tx following discharge: [x]HHC PT OT    []OUTPATIENT     [] No Further Treatment     [x] Family/Caregiver Training  []  Transitional Living Arrangement   [] Home Assessment (date  )     [] Family Conference   []  Therapeutic Pass       []  Other: (specify)    Estimated Discharge Date: 12/7/22    Estimated Discharge Destination: []home alone   []home alone with assist prn  []Continuous supervision [x]Return home with s/o/spouse/family   [] Assisted living    []SNF     Team members participating in today's conference. [x] Efren Christian, Medical Director  [x] Griselda Bernard, PANFILOT,       [] Sloane Rodriguez, RN Nurse Manager     [x]  Flor Childress, PT  []  Kyler Fay, PT       [] Garth Massey, OT   [x] Domitila Blue, OT  [] Tonette Sacks, OT     [x]  Leonel Rascon, SLP    []  Natan Carrillo, SLP   [] Selena Doyle, PRAFUL      [x]Debra Lopez,   []Jaz Hart MSW, LSW,      [x] Jannine Riedel, ABDI   [] Luis Eduardo Anders RN    [] Luz Marina Leong RN    [] Marquis Knapp RN [] Keith Garcia RN       I have led this Team Conference and agree with the plan, Efren Christian MD, 12/1/2022, 12:39 PM  Goals have been updated to reflect recent status.     Team conference note transcribed this date by: Charlee Ortiz MA, [id number] [facility], Therapy Coordinator

## 2022-11-30 NOTE — PROGRESS NOTES
Physical Therapy  [facility] ARU PHYSICAL THERAPY EVALUATION    Chart Review:  Past Medical History:   Diagnosis Date    Alcoholic cirrhosis of liver (HCC)     Esophageal varices ([address]     Neuropathy      Past Surgical History:   Procedure Laterality Date    APPENDECTOMY      HERNIA REPAIR      LIVER TRANSPLANT      liver stent     Social History:  Social/Functional History  Lives With: Alone  Type of Home: House  Home Layout: One level  Home Access: Stairs to enter without rails  Entrance Stairs - Number of Steps: 1 (from front)would have to walk through grass, 2 steps through garage  Entrance Stairs - Rails: None  Bathroom Shower/Tub: Tub/Shower unit  Bathroom Toilet: Standard  Bathroom Equipment: Shower chair, Grab bars in shower  Bathroom Accessibility:  (pt unsure if bathroom big enough for walker)  Home Equipment: Netlift  Has the patient had two or more falls in the past year or any fall with injury in the past year?: Yes (Pt has fallen 2-3 times in past year, this fall only one with injury)  Receives Help From: Family (pt needed assist with cleaning and anything requiring prolonged standing or activity tolerance due to B foot pain and decreased endurance)  ADL Assistance: Independent  Homemaking Assistance: Needs assistance  Homemaking Responsibilities: Yes  Meal Prep Responsibility: Primary  Laundry Responsibility: Secondary (family primarily does)  Cleaning Responsibility: Secondary (pt only performs light cleaning occasionally, family performs the rest)  Bill Paying/Finance Responsibility: Primary  Shopping Responsibility: Primary  Health Care Management: Primary  Ambulation Assistance: Independent (no device in house or to go to mailbox (400' round trip))  Transfer Assistance: Independent  Active : Yes  Mode of Transportation: Truck  Occupation: On disability  Type of Occupation: Pt on disability due to cirrhosis  Additional Comments: pt admits he only cleans up himself or house when he absolutely has to at baseline. sleeps in regular flat bed, but cannot sleep well on back    Restrictions:  Restrictions/Precautions  Restrictions/Precautions: General Precautions, Weight Bearing, Fall Risk (LLE NWB, order in for immobilzer, but no order for wearing one)            Pain Level: 3  Pain Location: Leg    Objective:  Orientation  Overall Orientation Status: Within Normal Limits  Orientation Level: Oriented X4        Vision  Vision Exceptions: Wears glasses for reading  Hearing  Hearing: Exceptions to [facility] (tinnitus)    Sensation:  Sensation  Overall Sensation Status: Impaired  Light Touch: Partial deficits in the LUE, Partial deficits in the RUE, Severe deficits in the RLE, Severe deficits in the LLE (pt reports this is chronic from ammonia damage)    Observation:   Observation/Palpation  Observation: pt sitting in bed, finishing breakfast. agreeable to therapy eval. Pt is very detailed with extraneous information given in response to all questions.  difficult at times to redirect to pertinent information    ROM:   PROM RLE (degrees)  RLE PROM: WNL     PROM LLE (degrees)  LLE General PROM: DF to neutral with stretch, knee flexion 45 degrees                 RLE PROM: WNL  Strength:    Strength RLE  Comment: DF 5/5, knee 4+/5, hip 4/5  Strength LLE  Comment: poor+ quad set, hip 2/5, ankle 2+/5              Bed Mobility:   Lying to Sitting on Side of Bed  Assistance Needed: Supervision or touching assistance  Comment: handed pt leg , cues for use  CARE Score: 4  Discharge Goal: Independent  Roll Left and Right  Assistance Needed: Independent  Comment: no bed features, uses RLE to assist LLE  CARE Score: 6  Discharge Goal: Independent  Sit to Lying  Assistance Needed: Supervision or touching assistance  Comment: supervision with difficulty bringing LLE into bed  CARE Score: 4  Discharge Goal: Independent    Transfers:    Sit to Stand  Assistance Needed: Supervision or touching assistance  Comment: SBA to 2ww with cues for technique  CARE Score: 4  Discharge Goal: Independent  Chair/Bed-to-Chair Transfer  Assistance Needed: Supervision or touching assistance  Comment: SBA with 2ww  CARE Score: 4  Discharge Goal: Independent     Car Transfer  Comment: feel pt would be able to attempt next session  Reason if not Attempted: Not attempted due to medical condition or safety concerns  CARE Score: 88  Discharge Goal: Independent    Ambulation:   Device used PTA: occasional cane   Walking Ability  Does the Patient Walk?: Yes     Walk 10 Feet  Assistance Needed: Supervision or touching assistance  Comment: CGA with 2ww, NWB LLE, good clearance hopping  CARE Score: 4  Discharge Goal: Independent     Walk 50 Feet with Two Turns  Assistance Needed: Supervision or touching assistance  Comment: CG with 2ww, NWB LLE  CARE Score: 4  Discharge Goal: Independent     Walk 150 Feet  Comment: 54' max distance  Reason if not Attempted: Not attempted due to medical condition or safety concerns  CARE Score: 88  Discharge Goal: Supervision or touching assistance     Walking 10 Feet on Uneven Surfaces  Comment: Pt may be able to attempt next session  Reason if not Attempted: Not attempted due to medical condition or safety concerns  CARE Score: 88  Discharge Goal: Independent     1 Step (Curb)  Comment: feel pt may be able to attempt next session  Reason if not Attempted: Not attempted due to medical condition or safety concerns  CARE Score: 88  Discharge Goal: Supervision or touching assistance     4 Steps  Comment: feel pt may be able to attempt next session  Reason if not Attempted: Not attempted due to medical condition or safety concerns  CARE Score: 88  Discharge Goal: Supervision or touching assistance     12 Steps  Reason if not Attempted: Not attempted due to medical condition or safety concerns  CARE Score: 88  Discharge Goal: Not Attempted    Gait Deviations: []None []Slow babak  [] Increased REESE  [] Staggers []Deviated Path  [] Decreased step length  [] Decreased step height  []Decreased arm swing  [] Shuffles  [] Decreased head and trunk rotation  []other:        Wheelchair:  w/c Ability: Wheelchair Ability  Uses a Wheelchair and/or Scooter?: Yes  Wheel 50 Feet with Two Turns  Assistance Needed: Supervision or touching assistance  Comment: verbal cues  CARE Score: 4  Discharge Goal: Independent  Wheel 150 Feet  Assistance Needed: Supervision or touching assistance  CARE Score: 4  Discharge Goal: Independent          Balance:        Object: Picking Up Object  Assistance Needed: Supervision or touching assistance  Comment: supervision with 2ww and reacher  CARE Score: 4  Discharge Goal: Independent               Assessment:   The patient is a 47year old male admitted onto ARU after hospitalization for mechanical fall resulting in LLE fx. Pt had reported that he was bending over to lift an animal cage with the cage catching the stairs as well as his sweatpants causing him to trip and fall on the cage. Pt denied hitting his head or losing consciousness. X-ray revealed L knee-lateral tibial plateau fx. On 11/28/22 Pt underwent L tibia ORIF. Per ortho, Pt to remain NWB LLE, but does not need to wear immobilizer. OK to perform ROM. Co morbidities of liver cirrhosis, alcoholic hepatitis, thrombocytopenia, and neuropathy and also pt reports chronic LBP with prior compression fxs(did not see in chart). PTA pt lives alone and was mod I for household ambulation, ADL and meal prep,  though had assist for  all other IADL. Pt presented today with use of 2ww and good ability to keep NWB status. Pt needed CG to SBA for mobility, though slow. Feel pt will benefit from ARU-PT to address deficits and progress to mod I with 2ww in home,supervision to enter,  but will likely need w/c for longer distances.       Body Structures, Functions, Activity Limitations Requiring Skilled Therapeutic Intervention: Decreased functional mobility , Decreased ADL status, Decreased ROM, Decreased strength, Decreased safe awareness, Decreased endurance, Decreased sensation, Decreased balance, Decreased high-level IADLs, Decreased fine motor control, Increased pain        Decision Making: Medium Complexity  Clinical Presentation: evolving characteristics      Patient education:   ARU schedule, ARU expectations for participation, plan of care, precautions  Treatment Initiated:  Functional mobility training, gait training, patient education  Barriers to Improvement:  chronic pain issues, neuropathy, decreased endurance(prior to this admission)  Discharge Recommendations:  home [place] [id number], increased assist for community mobility  Equipment Recommendations:  2ww, possible w/c    Goals:  Patient Goals   Patient Goals : return home  Short Term Goals  Time Frame for Short Term Goals: 7-10 days  Short Term Goal 1: pt to complete all bed mobility mod I  Short Term Goal 2: Pt to complete sit to stand to/from bed, commode, and car transfers with  mod I  Short Term Goal 3: Pt will ambulate 50' on level surfaces and 10' on unlevel surfaces with 2ww and NWB LLE with mod I, supervision for 150' on level surface  Short Term Goal 4: Pt will ascend/descend curb step with 2ww and 4 steps with rail with CGA  Short Term Goal 5: Pt will  object with reacher and 2ww with mod I  Additional Goals?: Yes  Short Term Goal 6: pt will propel mod I in w/c for >500'     Plan:    Requires PT Follow-Up: Yes  Pt will be seen at least 60 minutes per day for a minimum of 5 days per week, plus group therapy as appropriate  Physcial Therapy Plan  Current Treatment Recommendations: Strengthening, Balance training, Functional mobility training, Transfer training, Endurance training, IADL training, Gait training, Stair training, Neuromuscular re-education, Return to work related activity, Pain management, Patient/Caregiver education & training, Equipment evaluation, education, & procurement, Therapeutic activities, Positioning, Safety education & training    PT Individual Minutes  Time In: 0830  Time Out: 1005  Minutes: 95             Extra time due to difficulty keeping pt on track  PT Evaluation 30   Gait Training 20   Therapeutic Exercise    Neuro Re-Ed    Therapeutic Activity 30   Wheelchair Propulsion 15   Group    Other:    TOTAL 95       Electronically signed by:    Fifi Casanova, PT, PT   11/30/2022,

## 2022-11-30 NOTE — H&P
Amos Mccollum    : 1970  Acct #: [de-identified]  MRN: [id number]              History and physical      Admitting diagnosis: Left lateral tibial plateau fracture ([address])    Comorbid diagnoses impacting rehabilitation: Generalized weakness, gait disturbance, uncontrolled pain, alcoholic liver disease, cirrhosis, thrombocytopenia, alcoholic neuropathy    Chief complaint: Left leg pain. Poor sleep. History of present illness: The patient is a 64-year-old right-hand-dominant male who suffered a mechanical fall at home on 2022. He lost balance when carrying something in his garage. He landed awkwardly onto his left knee. He had immediate pain and disfigurement and was unable to walk well. He came to our ED where a minimally displaced left tibial plateau fracture was identified. On 2022 Dr. Cristóbal Mckeon performed an open reduction internal fixation of the left lateral tibial plateau fracture. He has restricted him to nonweightbearing and to use a knee immobilizer at all times. The patient has had significant pain, positional dizziness and poor sleep. His appetite is poor. He has been unable to do his own toileting, transfers and self-care and cannot return directly home. He requires inpatient rehabilitation to address these issues. Review of systems: Left leg pain. Numbness and tingling of all 4 extremities. Some limb cramping. Chronic nausea. Some positional dizziness. No chills or cough. No change to vision, speech or swallowing. The remainder of their review of systems was negative except as mentioned in the history of present illness.     Social History: Lives With: Alone  Type of Home: House  Home Layout: One level  Home Access: Stairs to enter without rails  Entrance Stairs - Number of Steps: 1 (from front), 3+3 (through garage)  Entrance Stairs - Rails: None  Bathroom Shower/Tub: Tub/Shower unit  Bathroom Toilet: Standard  Bathroom Equipment: Shower chair, Grab bars in shower  Home Equipment: [place], Crutches  ADL Assistance: Independent  Homemaking Assistance: Independent  Homemaking Responsibilities: Yes  Ambulation Assistance: Independent (uses cane in community)  Transfer Assistance: Independent  Active : Yes  Mode of Transportation: Truck  Occupation: Unemployed    He reports that he has been smoking cigarettes. He has been smoking an average of .5 packs per day. He has been exposed to tobacco smoke. He has never used smokeless tobacco. He reports that he does not currently use alcohol. He reports that he does not use drugs. Prior (baseline) level of function: Modified independent with mobility and self-care. Current level of function: Maximum physical assistance needed for mobility and self-care.     Allergies:  Prednisone    Past Medical History:   Past Medical History:   Diagnosis Date    Alcoholic cirrhosis of liver ([facility] [address]     Esophageal varices ([facility] [address]     Neuropathy         Past Surgical History:     Past Surgical History:   Procedure Laterality Date    APPENDECTOMY      HERNIA REPAIR      LIVER TRANSPLANT      liver stent       Current Medications:     Current Facility-Administered Medications:     enoxaparin Sodium (LOVENOX) injection 30 mg, 30 mg, SubCUTAneous, BID, Elin Salomon MD, 30 mg at 11/29/22 2110    sodium chloride flush 0.9 % injection 5-40 mL, 5-40 mL, IntraVENous, 2 times per day, Elin Salomon MD, 10 mL at 11/29/22 2117    ketorolac (TORADOL) injection 30 mg, 30 mg, IntraVENous, Q6H, Elin Salomon MD, 30 mg at 11/29/22 2110    oxyCODONE (ROXICODONE) immediate release tablet 5 mg, 5 mg, Oral, Q4H PRN **OR** oxyCODONE HCl (OXY-IR) immediate release tablet 10 mg, 10 mg, Oral, Q4H PRN, Elin Salomon MD    albuterol sulfate HFA (PROVENTIL;VENTOLIN;PROAIR) 108 (90 Base) MCG/ACT inhaler 2 puff, 2 puff, Inhalation, Q6H PRN, Elin Salomon MD    bisacodyl (DULCOLAX) EC tablet 10 mg, 10 mg, Oral, Daily PRN, Elin Salomon MD    [START ON 11/30/2022] furosemide (LASIX) tablet 60 mg, 60 mg, Oral, Daily, Rahat Holbrook MD    lactulose (CHRONULAC) 10 GM/15ML solution 30 g, 30 g, Oral, BID, Rahat Holbrook MD, 30 g at 11/29/22 2117    [START ON 11/30/2022] spironolactone (ALDACTONE) tablet 150 mg, 150 mg, Oral, Lunch, Rahat Holbrook MD    Jessica Bullard ON 11/30/2022] therapeutic multivitamin-minerals 1 tablet, 1 tablet, Oral, Daily, Rahat Holbrook MD    polyethylene glycol [facility]) packet 17 g, 17 g, Oral, Daily PRN, VINCE Castellano MD    Family History:   No family history on file. Exam:    Blood pressure (!) 159/74, pulse 93, temperature 99.2 °F (37.3 °C), temperature source Oral, resp. rate 18, height 5' 5\" (1.651 m), weight 231 lb 7.7 oz (105 kg), SpO2 99 %. General: Patient was seen semirecumbent in bed. An immobilizer was across his left knee. He was alert but easily distracted. In no distress. HEENT: No signs of trauma to his head or neck. Guarded cervical rotation bilaterally beyond 60 degrees. No JVD or adenopathy. MMM. Clear speech. Pulmonary: Shallow respirations with symmetric air exchange. Cardiac: Regular rate and rhythm. Abdomen: Patient's abdomen was soft and nondistended. Bowel sounds were present throughout. There was no rebound, guarding or masses noted. Some discomfort to palpation in the right upper quadrant. Spinal exam: Pinkness over bony landmarks. Skin was intact. No gross malalignments. Upper extremities: Patient was able to bring both hands up to his chin. He lacked reflexes but had normal tone. Diminished sensation in the fingertips. 4/5 strength across the wrist and digits. Lower extremities: No deep tendon reflexes. Sensation is blunted about both feet. There is swelling throughout the left lower limb with bruising of the knee. The immobilizer was in place. Left leg was tender to palpation. Sitting balance was poor. Standing balance was poor.     Lab Results   Component Value Date    WBC 6.7 11/27/2022    HGB 12.7 (L) 11/27/2022    HCT 36.9 (L) 11/27/2022    MCV 96.3 11/27/2022     11/27/2022     Lab Results   Component Value Date    INR 1.15 11/27/2022    PROTIME 14.9 (H) 11/27/2022     Lab Results   Component Value Date    CREATININE 0.6 (L) 11/27/2022    BUN 13 11/27/2022     (L) 11/27/2022    K 3.6 11/27/2022    CL 97 (L) 11/27/2022    CO2 27 11/27/2022     No results found for: ALT, AST, GGT, ALKPHOS, BILITOT      Impression: [id number] alcoholic male with a history of cirrhosis and peripheral neuropathy who was suffered a traumatic injury to his left knee yielding a left lateral tibial plateau fracture. His pain is poorly controlled. Strengths for the patient: His age, some experience with adaptive equipment and a reasonably accessible home. Limitations/barriers for the patient: He lives alone, his alcoholic neuropathy in the nonweightbearing through the left leg. Recommendation: Acute inpatient rehabilitation with occupational and physical therapy 180 minutes 5 out of every 7 days. Will address basic and  advancing mobility with self-care instruction and adaptive equipment training. Caregiver education will be offered. Expected length of stay  prior to a supervised level of function for discharge home with a walker and C OT/PT is 2 weeks. Additional recommendation:    Left lateral tibial plateau fracture with gait disturbance: The patient requires daily occupational and physical therapy. We must teach him techniques for self-care and mobility following no weightbearing through the left lower limb. Immobilizer on the left knee at all times. Monitoring his incision for signs of infection. He needs aggressive pulmonary hygiene measures, nutritional support, pain management and DVT prophylaxis. Outpatient follow-up with orthopedics in 2 weeks. DVT prophylaxis: Lovenox 30 mg SQ twice daily. His surgeon has committed him to a 6-week course. He will need instruction on self administration of this medication.   Monitoring his hemoglobin and platelet count periodically while on this medication. Close monitoring of his thrombocytopenia. Uncontrolled pain: He comes with orders for scheduled Toradol for the next 3 days. He has oxycodone available as well. Cryotherapy. Avoiding acetaminophen products while struggling with cirrhosis. Alcoholic liver disease: Chronulac 10 g twice daily. No Tylenol products. Cautious diuresis. Neuropathy: He may be a candidate for gabapentin. Weightbearing through his right lower limb is much as possible. I personally performed a history and physical on this patient within 24 hours of admission to the rehab unit. I have reviewed the preadmission screening and concur with its findings without change. A detailed plan of care will be established by hospital day 4 and I attest the patient is appropriate for inpatient rehabilitation at this time. I have compared the patient's current functional status noted during my history and physical with that of the preadmission screen and I have found no significant differences.

## 2022-12-01 PROCEDURE — 97530 THERAPEUTIC ACTIVITIES: CPT

## 2022-12-01 PROCEDURE — 6370000000 HC RX 637 (ALT 250 FOR IP): Performed by: STUDENT IN AN ORGANIZED HEALTH CARE EDUCATION/TRAINING PROGRAM

## 2022-12-01 PROCEDURE — 1280000000 HC REHAB R&B

## 2022-12-01 PROCEDURE — 6370000000 HC RX 637 (ALT 250 FOR IP): Performed by: PHYSICAL MEDICINE & REHABILITATION

## 2022-12-01 PROCEDURE — 97535 SELF CARE MNGMENT TRAINING: CPT

## 2022-12-01 PROCEDURE — 76937 US GUIDE VASCULAR ACCESS: CPT

## 2022-12-01 PROCEDURE — 94150 VITAL CAPACITY TEST: CPT

## 2022-12-01 PROCEDURE — 6360000002 HC RX W HCPCS: Performed by: STUDENT IN AN ORGANIZED HEALTH CARE EDUCATION/TRAINING PROGRAM

## 2022-12-01 PROCEDURE — 97116 GAIT TRAINING THERAPY: CPT

## 2022-12-01 PROCEDURE — 97542 WHEELCHAIR MNGMENT TRAINING: CPT

## 2022-12-01 PROCEDURE — 97110 THERAPEUTIC EXERCISES: CPT

## 2022-12-01 PROCEDURE — 94761 N-INVAS EAR/PLS OXIMETRY MLT: CPT

## 2022-12-01 PROCEDURE — 2580000003 HC RX 258: Performed by: STUDENT IN AN ORGANIZED HEALTH CARE EDUCATION/TRAINING PROGRAM

## 2022-12-01 RX ORDER — IBUPROFEN 400 MG/1
400 TABLET ORAL EVERY 6 HOURS PRN
Status: DISCONTINUED | OUTPATIENT
Start: 2022-12-01 | End: 2022-12-07 | Stop reason: HOSPADM

## 2022-12-01 RX ADMIN — SODIUM CHLORIDE, PRESERVATIVE FREE 10 ML: 5 INJECTION INTRAVENOUS at 21:02

## 2022-12-01 RX ADMIN — LACTULOSE 30 G: 10 SOLUTION ORAL at 21:01

## 2022-12-01 RX ADMIN — IBUPROFEN 400 MG: 400 TABLET ORAL at 23:14

## 2022-12-01 RX ADMIN — KETOROLAC TROMETHAMINE 30 MG: 30 INJECTION, SOLUTION INTRAMUSCULAR; INTRAVENOUS at 02:47

## 2022-12-01 RX ADMIN — OXYCODONE HYDROCHLORIDE 5 MG: 5 TABLET ORAL at 06:46

## 2022-12-01 RX ADMIN — SODIUM CHLORIDE, PRESERVATIVE FREE 10 ML: 5 INJECTION INTRAVENOUS at 09:16

## 2022-12-01 RX ADMIN — LACTULOSE 30 G: 10 SOLUTION ORAL at 09:15

## 2022-12-01 RX ADMIN — ENOXAPARIN SODIUM 30 MG: 100 INJECTION SUBCUTANEOUS at 09:12

## 2022-12-01 RX ADMIN — FUROSEMIDE 60 MG: 40 TABLET ORAL at 09:14

## 2022-12-01 RX ADMIN — Medication 1 TABLET: at 09:14

## 2022-12-01 RX ADMIN — OXYCODONE HYDROCHLORIDE 5 MG: 5 TABLET ORAL at 21:02

## 2022-12-01 RX ADMIN — ENOXAPARIN SODIUM 30 MG: 100 INJECTION SUBCUTANEOUS at 21:01

## 2022-12-01 RX ADMIN — KETOROLAC TROMETHAMINE 30 MG: 30 INJECTION, SOLUTION INTRAMUSCULAR; INTRAVENOUS at 09:13

## 2022-12-01 RX ADMIN — SPIRONOLACTONE 150 MG: 50 TABLET ORAL at 12:27

## 2022-12-01 ASSESSMENT — PAIN DESCRIPTION - PAIN TYPE
TYPE: SURGICAL PAIN

## 2022-12-01 ASSESSMENT — PAIN DESCRIPTION - FREQUENCY
FREQUENCY: CONTINUOUS

## 2022-12-01 ASSESSMENT — PAIN DESCRIPTION - LOCATION
LOCATION: ANKLE;KNEE;LEG
LOCATION: LEG;KNEE
LOCATION: ANKLE;KNEE;LEG
LOCATION: ANKLE;KNEE;LEG
LOCATION: KNEE;LEG;ANKLE
LOCATION: LEG;KNEE
LOCATION: ANKLE;KNEE;LEG
LOCATION: ANKLE;LEG;KNEE
LOCATION: ANKLE;KNEE;LEG
LOCATION: LEG

## 2022-12-01 ASSESSMENT — PAIN SCALES - WONG BAKER
WONGBAKER_NUMERICALRESPONSE: 2

## 2022-12-01 ASSESSMENT — PAIN DESCRIPTION - ORIENTATION
ORIENTATION: LEFT

## 2022-12-01 ASSESSMENT — PAIN DESCRIPTION - DESCRIPTORS
DESCRIPTORS: ACHING
DESCRIPTORS: ACHING;DISCOMFORT
DESCRIPTORS: ACHING;DISCOMFORT
DESCRIPTORS: DISCOMFORT
DESCRIPTORS: ACHING;DISCOMFORT
DESCRIPTORS: ACHING;DISCOMFORT
DESCRIPTORS: DISCOMFORT
DESCRIPTORS: ACHING;DISCOMFORT
DESCRIPTORS: ACHING
DESCRIPTORS: ACHING

## 2022-12-01 ASSESSMENT — PAIN DESCRIPTION - ONSET
ONSET: ON-GOING

## 2022-12-01 ASSESSMENT — PAIN SCALES - GENERAL
PAINLEVEL_OUTOF10: 2
PAINLEVEL_OUTOF10: 2
PAINLEVEL_OUTOF10: 3
PAINLEVEL_OUTOF10: 0
PAINLEVEL_OUTOF10: 4
PAINLEVEL_OUTOF10: 2
PAINLEVEL_OUTOF10: 4
PAINLEVEL_OUTOF10: 2
PAINLEVEL_OUTOF10: 4
PAINLEVEL_OUTOF10: 2
PAINLEVEL_OUTOF10: 4
PAINLEVEL_OUTOF10: 3

## 2022-12-01 ASSESSMENT — PAIN - FUNCTIONAL ASSESSMENT

## 2022-12-01 NOTE — PROGRESS NOTES
Physical Therapy    [x] daily progress note       [] discharge       Patient Name:  Jesse Negron   :  1970 MRN: [id number]  Room:  [address] [place] [address] Date of Admission: 2022  Rehabilitation Diagnosis:   Closed fracture of lateral portion of left tibial plateau, initial encounter [S82.122A]       Date 2022       Day of ARU Week:  3   Time IN//930   Individual Tx Minutes 60   Group Tx Minutes    Co-Treat Minutes    Concurrent Tx Minutes    TOTAL Tx Time Mins 60   Variance Time    Variance Time []   Refusal due to:     []   Medical hold/reason:    []   Illness   []   Off Unit for test/procedure  []   Extra time needed to complete task  []   Therapeutic need  []   Other (specify):   Restrictions Restrictions/Precautions  Restrictions/Precautions: General Precautions, Weight Bearing, Fall Risk (LLE NWB, order in for immobilzer, but no order for wearing one)      Interdisciplinary communication [x]   Cleared for therapy per nursing     []   RN notified about issues during session  []   RN updated on pt performance  []   Spoke with   []   Spoke with OT  []   Spoke with MD  []   Other:    Subjective observations and cognitive status: Pt in w/c, states upper body feeling shaky following OT activities, states plan of using cane to manage entry step and was provided with safety education with being assisted at w/c level versus having a ramp installed. Pt states that his sister knows how to manage a w/c on a step as she was their father's caregiver before.       Pain level/location:    2/10       Location: LLE   Discharge recommendations  Anticipated discharge date:  TBD  Destination: []home alone   []home alone with assist PRN     [] home w/ family      [] Continuous supervision  []SNF    [] Assisted living     [] Other:  Continued therapy: [x]HHC PT  []OUTPATIENT PT   [] No Further PT  []SNF PT  Caregiver training recommended: []Yes  [] No   Equipment needs: 2WW, manual w/c with elevating leg rests and anti-tippers  Marie Barnes requires the assistance of a front-wheeled walker to successfully ambulate from room to room at home to allow completion of daily living tasks such as: bathing, toileting, dressing and grooming. A wheeled walker is necessary due to the patient's unsteady gait, upper body weakness, inability to  a standard walker. This patient can ambulate only by pushing a walker instead of using a lesser assistive device such as a cane or crutch. Marie Barnes requires a standard wheelchair to successfully complete daily living tasks such as: bathing, toileting, dressing and grooming; or any other daily living task in the home. A standard manual wheelchair is necessary due to patient's impaired ambulation and mobility restrictions and would be unable to resolve these daily living tasks using a cane or walker. The patient is capable of using/self-propelling a standard wheelchair safely in their home and can maneuver within their home with adequate access in a reasonable amount of time. The patient has not expressed an unwillingness to use the wheelchair. Having this wheelchair would enable Marie Barnes ability to regularly participate in the above mentioned daily living tasks around the home. There is a caregiver available to provide necessary assistance. Expected length of need is at least 6 mos. Marie Barnes does not have any infectious diagnoses. Marie Barnes can self propel a wheelchair and needs anti-rollback device because of ramps.     Marie Barnes requires elevated leg rests due to a musculoskeletal condition which prevents 90 degree flexion in the knee and due to significant edema of the lower extremeties that requires an elevating leg rest.     PT IRF-JUDE scores and goals for discharge assessment:     Sit to Stand  Assistance Needed: Supervision or touching assistance  Comment: CGA using 2WW following NWB LLE  CARE Score: 4  Discharge Goal: Independent    Chair/Bed-to-Chair Transfer  Assistance Needed: Supervision or touching assistance  Comment: CGA using 2WW following NWB LLE  CARE Score: 4  Discharge Goal: Independent      Car Transfer  Assistance Needed: Supervision or touching assistance  Comment: CGA using 2WW following NWB LLE  CARE Score: 4  Discharge Goal: Independent    Walk 10 Feet? Walk 10 Feet?: Yes    1 Step  1 Step?: Yes    Wheelchair Ability  Uses a Wheelchair and/or Scooter?: Yes    Wheel 50 Feet with Two Turns  Assistance Needed: Independent  Comment: Mod Ind using BUE  CARE Score: 6  Discharge Goal: Independent    Wheel 150 Feet  Assistance Needed: Independent  Comment:  Mod Ind using BUE  CARE Score: 6  Discharge Goal: Independent              Walking Ability  Does the Patient Walk?: Yes    Walk 10 Feet  Assistance Needed: Supervision or touching assistance  Comment: CGA using 2WW following NWB LLE  CARE Score: 4  Discharge Goal: Independent    Walking 10 Feet on Uneven Surfaces  Assistance Needed: Supervision or touching assistance  Comment: CGA using 2WW following NWB on LLE  CARE Score: 4  Discharge Goal: Independent    1 Step (Curb)  Comment: trained to ascend/descend 4\" curb step using // bars with Min A following NWB on LLE; pt reports increased fatigue and pain on LLE after 2 trials of this activity  CARE Score: 88  Discharge Goal: Supervision or touching assistance    Patient/Caregiver Education and Training:   []   Role of PT  []   Education about Dx  []   Use of call light for assist   []   HEP provided and explained   [x]   Treatment plan reviewed  [x]   Home safety  []   Wheelchair mobility/management   []   Body mechanics  []   Bed Mobility/Transfer technique  []   Gait technique/sequencing  []   Proper use of assistive device/adaptive equipment  [x]   Advanced mobility safety and technique  []   Reinforced patient's precautions/mobility while maintaining precautions  []   Postural awareness  []   Family/caregiver training  []   Progress was updated and reviewed in Rehabtracker with patient and/or family this date. [x]   Other: Pt education about care conference process and team goals related to discharge planning, DME recommendations for mobility, and follow-up PT after ARU stay. Treatment Plan for Next Session: continue curb step training in // bars and progress with 2WW if safe and able; increase ambulation distance over level surface     Assessment: This pt demonstrated a positive response to today's treatment as evidenced by tolerance to advanced gait training and successful completion of car transfer. The patient is making good progress toward established goals as evidenced by QI scores.      Treatment/Activity Tolerance:   [] Tolerated treatment with no adverse effects    [x] Patient limited by fatigue  [] Patient limited by pain   [] Patient limited by medical complications:    [] Adverse reaction to Tx:   [] Significant change in status    Safety:       []  bed alarm set    []  chair alarm set    []  Pt refused alarms                []  Telesitter activated      [x]  Gait belt used during tx session      []other:       Number of Minutes/Billable Intervention  Gait Training 30   Therapeutic Exercise    Neuro Re-Ed    Therapeutic Activity 15   Wheelchair Propulsion 15   Group    Other:    TOTAL 60     Social History  Social/Functional History  Lives With: Alone  Type of Home: House  Home Layout: One level  Home Access: Stairs to enter without rails  Entrance Stairs - Number of Steps: 1 (from front)would have to walk through grass, 2 steps through garage  Entrance Stairs - Rails: None  Bathroom Shower/Tub: Tub/Shower unit  Bathroom Toilet: Standard  Bathroom Equipment: Shower chair, Grab bars in shower  Bathroom Accessibility:  (pt unsure if bathroom big enough for walker)  Home Equipment: Yasmin Smith  Has the patient had two or more falls in the past year or any fall with injury in the past year?: Yes (Pt has fallen 2-3 times in past year, this fall only one with injury)  Receives Help From: Family (pt needed assist with cleaning and anything requiring prolonged standing or activity tolerance due to B foot pain and decreased endurance)  ADL Assistance: [address]: Needs assistance  Homemaking Responsibilities: Yes  Meal Prep Responsibility: Primary  Laundry Responsibility: Secondary (family primarily does)  Cleaning Responsibility: Secondary (pt only performs light cleaning occasionally, family performs the rest)  Bill Paying/Finance Responsibility: Primary  Shopping Responsibility: Primary  Health Care Management: Primary  Ambulation Assistance: Independent (no device in house or to go to mailbox (400' round trip))  Transfer Assistance: Independent  Active : Yes  Mode of Transportation: Truck  Occupation: On disability  Type of Occupation: Pt on disability due to cirrhosis  Additional Comments: pt admits he only cleans up himself or house when he absolutely has to at baseline.  sleeps in regular flat bed, but cannot sleep well on back    Objective                                                                                    Goals:  (Update in navigator)  Short Term Goals  Time Frame for Short Term Goals: 7-10 days  Short Term Goal 1: pt to complete all bed mobility mod I  Short Term Goal 2: Pt to complete sit to stand to/from bed, commode, and car transfers with  mod I  Short Term Goal 3: Pt will ambulate 50' on level surfaces and 10' on unlevel surfaces with 2ww and NWB LLE with mod I, supervision for 150' on level surface  Short Term Goal 4: Pt will ascend/descend curb step with 2ww and 4 steps with rail with CGA  Short Term Goal 5: Pt will  object with reacher and 2ww with mod I  Additional Goals?: Yes  Short Term Goal 6: pt will propel mod I in w/c for >500':   :        Plan of Care                                                                              Times per week: 5 days per week for a minimum of 60 minutes/day plus group as appropriate for 60 minutes.   Treatment to include Current Treatment Recommendations: Strengthening, Balance training, Functional mobility training, Transfer training, Endurance training, IADL training, Gait training, Stair training, Neuromuscular re-education, Return to work related activity, Pain management, Patient/Caregiver education & training, Equipment evaluation, education, & procurement, Therapeutic activities, Positioning, Safety education & training    Electronically signed by   Yoni Swan PT  12/1/2022, 10:41 AM

## 2022-12-01 NOTE — CARE COORDINATION
Patient reviewed at today's care conference. Patient will need a ramp or caregiver training with his sister on bumping him into the house. Patient will need WC with elevating leg rests and anti tippers and a RW. [facility] pt/ot recommended. Discharge 12/7. Case mgt updated patient in room. Patient is agreeable to dc date and plan. Case mgt will provide patient with a list of in network [facility] agencies. Family will provide dc transport.

## 2022-12-01 NOTE — PROGRESS NOTES
Occupational Therapy    Physical Rehabilitation: OCCUPATIONAL THERAPY     [x] daily progress note       [] discharge       Patient Name:  Per Gibson   :  1970 MRN: [id number]  Room:  [address] [place] [address] Date of Admission: 2022  Rehabilitation Diagnosis:   Closed fracture of lateral portion of left tibial plateau, initial encounter [S82.122A]       Date 2022       Day of ARU Week:  3   Time IN/OUT 0830/0930  1100/1200   Individual Tx Minutes 60 + 60   Group Tx Minutes    Co-Treat Minutes    Concurrent Tx Minutes    TOTAL Tx Time Mins 120   Variance Time    Variance Time []   Refusal due to:     []   Medical hold/reason:    []   Illness   []   Off Unit for test/procedure  []   Extra time needed to complete task  []   Therapeutic need  []   Other (specify):   Restrictions Restrictions/Precautions: General Precautions, Weight Bearing, Fall Risk (LLE NWB, order in for immobilzer, but no order for wearing one)         Communication with other providers: [x]   OK to see per nursing:     []   Spoke with team member regarding:      Subjective observations and cognitive status: 1st: Pt in semi-atkins's position upon entrance, pleasant and agreeable to therapy. Pt denies ADLs other than dressing tasks to leave the room. Pt very verbose requiring cues for redirection. 2nd: Pt in w/c upon entrance, eating some fruit, pleasant and agreeable to therapy. Pain level/location:    2/10       Location: L ankle, foot, lower leg   Discharge recommendations  Anticipated discharge date:    Destination: []home alone   [x]home alone w assist prn   [] home w/ family    [] Continuous supervision       []SNF    [] Assisted living     [] Other:   Continued therapy: [x][facility] OT  []OUTPATIENT  OT   [] No Further OT  Equipment needs: None.        ADLs:      LB Dressing: Lower Body Dressing  Assistance Needed: Supervision or touching assistance  Comment: Pt able to thread BLEs into pajama pants with use of reacher for LLE and able to manage over hips in bed with supervision. CARE Score: 4  Discharge Goal: Independent    Donning and [employer] Footwear: Putting On/Taking Off Footwear  Assistance Needed: Supervision or touching assistance  Comment: Pt able to don sock and shoe on R foot using figure four method sitting EOB; Pt educated on use of sock aide and able to perform after demo on L foot. CARE Score: 4  Discharge Goal: Independent        Bed Mobility:           []   Pt received out of bed   Supine --> Sit:  IND      Transfers:    Sit--> Stand:  CGA  Stand --> Sit:   CGA  Stand-Pivot:   CGA  Other:    Assistive device required for transfer:   2WW      Functional Mobility:    Assistance:  Pt propelled self in w/c 130 ft x 3 with Mod I. Pt CGA in room with 2WW. Device:   [x]   Rolling Walker     []   Standard Anthony Crew [x]   Wheelchair        []   Aranza Bakes       []   4-Wheeled Anthony Crew         []   Cardiac Anthony Crew       []   Other:        Additional Therapeutic activities/exercises completed this date:     [x]   ADL Training   [x]   Balance/Postural training     [x]   Bed/Transfer Training   [x]   Endurance Training   []   Neuromuscular Re-ed   []   Nu-step:  Time:        Level:         #Steps:       [x]   Rebounder:    []  Seated     [x]  Standing Pt performed 2 sets of 15 reps standing with 2WW unilateral support. Performed to increase standing balance/tolerance to facilitate safe performance of ADLs.       []   Supine Ther Ex (reps/sets):     [x]   Seated Ther Ex (reps/sets):  Pt performed 15 mins on arm bike with Mod resistance. Pt required a few rest breaks primarily d/t nursing coming into therapy gym to distribute meds. Pt performed 3 sets of 20 reps on [place] with 40# total weight. Pt then completed BUE exercises with 3# dumbbell, 1 set of 20 reps, including bicep curls, chest press, overhead press, shoulder horizontal abduction, shoulder external/internal rotation, and forearm pronation/supination.  Pt performed all exercise to increase BUE strength and endurance to facilitate safe performance of ADLs. []   Standing Ther Ex (reps/sets):     []   Other:      Comments: All intervention performed to increase pt's strength, endurance, ax tolerance, and balance in prep for increased I c ADL/IADLs and functional transfers/mobility. Patient/Caregiver Education and Training:   [x]   Adaptive Equipment Use  [x]   Bed Mobility/Transfer Technique/Safety  []   Energy Conservation Tips  []   Family training  []   Postural Awareness  [x]   Safety During Functional Activities  []   Reinforced Patient's Precautions   []   Progress was updated and reviewed in Rehabtracker with patient and/or family this         date. Treatment Plan for Next Session: Continue OT POC      Assessment: This pt demonstrated a positive response to today's treatment as evidenced by good carryover of learned techniques with AE for ADL tasks. The patient is making good progress toward established goals as evidenced by QI scores. Ongoing deficits are observed in the areas of functional balance, strength, endurance, FMC, pain, sensation, and ADLs and continued focus on this is recommended.        Treatment/Activity Tolerance:   [x] Tolerated treatment with no adverse effects    [] Patient limited by fatigue  [] Patient limited by pain   [] Patient limited by medical complications:    [] Adverse reaction to Tx:   [] Significant change in status    Safety:       []  bed alarm set    []  chair alarm set    []  Pt refused alarms                []  Telesitter activated      [x]  Gait belt used during tx session      [x]other:   Passed off to Alicia PT at the end of first session    Number of Minutes/Billable Intervention  Therapeutic Exercise 85   ADL Self-care 15   Neuro Re-Ed    Therapeutic Activity 20   Group    Other:    TOTAL 120       Social History  Social/Functional History  Lives With: Alone  Type of Home: House  Home Layout: One level  Home Access: Stairs to enter without rails  Entrance Stairs - Number of Steps: 1 (from front)would have to walk through grass, 2 steps through garage  Entrance Stairs - Rails: None  Bathroom Shower/Tub: Tub/Shower unit  Bathroom Toilet: Standard  Bathroom Equipment: Shower chair, Grab bars in shower  Bathroom Accessibility:  (pt unsure if bathroom big enough for walker)  Home Equipment: [employer]  Has the patient had two or more falls in the past year or any fall with injury in the past year?: Yes (Pt has fallen 2-3 times in past year, this fall only one with injury)  Receives Help From: Family (pt needed assist with cleaning and anything requiring prolonged standing or activity tolerance due to B foot pain and decreased endurance)  ADL Assistance: [address]: Needs assistance  Homemaking Responsibilities: Yes  Meal Prep Responsibility: Primary  Laundry Responsibility: Secondary (family primarily does)  Cleaning Responsibility: Secondary (pt only performs light cleaning occasionally, family performs the rest)  Bill Paying/Finance Responsibility: Primary  Shopping Responsibility: Primary  Health Care Management: Primary  Ambulation Assistance: Independent (no device in house or to go to mailbox (400' round trip))  Transfer Assistance: Independent  Active : Yes  Mode of Transportation: Truck  Occupation: On disability  Type of Occupation: Pt on disability due to cirrhosis  Additional Comments: pt admits he only cleans up himself or house when he absolutely has to at baseline. sleeps in regular flat bed, but cannot sleep well on back    Objective                                                                                    Goals:  (Update in navigator)  Short Term Goals  Time Frame for Short Term Goals: STGs=LTGs:  Long Term Goals  Time Frame for Long Term Goals : 7-10 days or until d/c.   Long Term Goal 1: Pt will complete self feeding with Mod I.  Long Term Goal 2: Pt will complete oral hygiene and grooming tasks with Mod I.  Long Term Goal 3: Pt will complete total body bathing with AE PRN and Mod I.  Long Term Goal 4: Pt will complete UB dressing with IND. Long Term Goal 5: Pt will complete LB dressing with AE PRN and Mod I. Additional Goals?: Yes  Long Term Goal 6: Pt will doff/don footwear with AE PRN and Mod I.  Long Term Goal 7: Pt will perform toileting with Mod I.  Long Term Goal 8: Pt will perform functional transfers (bed, chair, toilet, shower) with DME PRN and Mod I.  Long Term Goal 9: Pt will perform therex/therax to facilitate an increase in strength/endurance (with emphasis on dynamic standing balance/tolerance > 8 mins) with Mod I.  Long Term Goal 10: Pt will perform meal prep with Mod I.:        Plan of Care                                                                              Times per week: 5 days per week for a minimum of 60 minutes/day plus group as appropriate for 60 minutes.   Treatment to include Occupational Therapy Plan  Current Treatment Recommendations: Strengthening, ROM, Balance training, Functional mobility training, Endurance training, Pain management, Safety education & training, Patient/Caregiver education & training, Equipment evaluation, education, & procurement, Positioning, Self-Care / ADL, Home management training, Coordination training    Electronically signed by   SUNSHINE Albarado, OTR/L #[id number]  12/1/2022, 3:09 PM

## 2022-12-01 NOTE — PLAN OF CARE
Problem: Discharge Planning  Goal: Discharge to home or other facility with appropriate resources  12/1/2022 1201 by Shahnaz Moraes RN  Outcome: Progressing  11/30/2022 2239 by Simin Lo  Outcome: Progressing     Problem: Skin/Tissue Integrity  Goal: Absence of new skin breakdown  Description: 1. Monitor for areas of redness and/or skin breakdown  2. Assess vascular access sites hourly  3. Every 4-6 hours minimum:  Change oxygen saturation probe site  4. Every 4-6 hours:  If on nasal continuous positive airway pressure, respiratory therapy assess nares and determine need for appliance change or resting period.   12/1/2022 1201 by Shahnaz Moraes RN  Outcome: Progressing  11/30/2022 2239 by Simin Lo  Outcome: Progressing     Problem: Safety - Adult  Goal: Free from fall injury  12/1/2022 1201 by Shahnaz Moraes RN  Outcome: Progressing  11/30/2022 2239 by Simin Lo  Outcome: Progressing     Problem: Pain  Goal: Verbalizes/displays adequate comfort level or baseline comfort level  12/1/2022 1201 by Shahanz Moraes RN  Outcome: Progressing  11/30/2022 2239 by Simin Lo  Outcome: Progressing     Problem: ABCDS Injury Assessment  Goal: Absence of physical injury  12/1/2022 1201 by Shahnaz Moraes RN  Outcome: Progressing  11/30/2022 2239 by Simin Lo  Outcome: Progressing     Problem: Nutrition Deficit:  Goal: Optimize nutritional status  12/1/2022 1201 by Shahnaz Moraes RN  Outcome: Progressing  11/30/2022 2239 by Simin Lo  Outcome: Progressing

## 2022-12-01 NOTE — PROGRESS NOTES
Brittny Mulligan    : 1970  Acct #: [de-identified]  MRN: [id number]              PM&R Progress Note      Admitting diagnosis: Left lateral tibial plateau fracture ( [address])     Comorbid diagnoses impacting rehabilitation: Generalized weakness, gait disturbance, uncontrolled pain, alcoholic liver disease, cirrhosis, thrombocytopenia, alcoholic neuropathy    Chief complaint: Various pains including left posterior thigh and his \"sciatica\". He complains of left leg swelling. Prior (baseline) level of function: Independent. Current level of function:         Current  IRF-JUDE and Goals:   Occupational Therapy:    Short Term Goals  Time Frame for Short Term Goals: STGs=LTGs :   Long Term Goals  Time Frame for Long Term Goals : 7-10 days or until d/c. Long Term Goal 1: Pt will complete self feeding with Mod I.  Long Term Goal 2: Pt will complete oral hygiene and grooming tasks with Mod I.  Long Term Goal 3: Pt will complete total body bathing with AE PRN and Mod I.  Long Term Goal 4: Pt will complete UB dressing with IND. Long Term Goal 5: Pt will complete LB dressing with AE PRN and Mod I. Additional Goals?: Yes  Long Term Goal 6: Pt will doff/don footwear with AE PRN and Mod I.  Long Term Goal 7: Pt will perform toileting with Mod I.  Long Term Goal 8: Pt will perform functional transfers (bed, chair, toilet, shower) with DME PRN and Mod I.  Long Term Goal 9: Pt will perform therex/therax to facilitate an increase in strength/endurance (with emphasis on dynamic standing balance/tolerance > 8 mins) with Mod I.  Long Term Goal 10: Pt will perform meal prep with Mod I. :                                       Eating: Eating  Assistance Needed: Setup or clean-up assistance  Comment: Pt reports occasional assist opening containers, able to feed self IND.   CARE Score: 5  Discharge Goal: Independent       Oral Hygiene: Oral Hygiene  Assistance Needed: Setup or clean-up assistance  Comment: Setup assist seated sinkside. CARE Score: 5  Discharge Goal: Independent    UB/LB Bathing: Shower/Bathe Self  Assistance Needed: Partial/moderate assistance  Comment: Pt completed full shower, seated for entirety, weight shifting to wash rear. Pt requires assist washing distal LLE. CARE Score: 3  Discharge Goal: Independent    UB Dressing: Upper Body Dressing  Assistance Needed: Supervision or touching assistance  Comment: Sup to don pullover shirt. CARE Score: 4  Discharge Goal: Independent         LB Dressing: Lower Body Dressing  Assistance Needed: Supervision or touching assistance  Comment: Pt able to thread BLEs into depends with use of reacher, able to manage over hips with CGA. CARE Score: 4  Discharge Goal: Independent    Donning and [place] Footwear: Putting On/Taking Off Footwear  Assistance Needed: Partial/moderate assistance  Comment: Pt able to doff/don sock and shoe on R foot, however unable to complete on L foot. CARE Score: 3  Discharge Goal: Independent      Toiletin [address] needed: Supervision or touching assistance  Comment: CGA in stance to perform clothing management and bowel hygiene as Pt shaky balancing self on the R foot following NWB precautions on L. CARE Score: 4  Discharge Goal: Independent      Toilet Transfers: Toilet Transfer  Assistance needed: Supervision or touching assistance  Comment: CGA with use of 2ww and Max verbal cues for safety as Pt impulsive with urgency.   CARE Score: 4  Discharge Goal: Independent    Physical Therapy:   Short Term Goals  Time Frame for Short Term Goals: 7-10 days  Short Term Goal 1: pt to complete all bed mobility mod I  Short Term Goal 2: Pt to complete sit to stand to/from bed, commode, and car transfers with  mod I  Short Term Goal 3: Pt will ambulate 50' on level surfaces and 10' on unlevel surfaces with 2ww and NWB LLE with mod I, supervision for 150' on level surface  Short Term Goal 4: Pt will ascend/descend curb step with 2ww and 4 steps with rail with CGA  Short Term Goal 5: Pt will  object with reacher and 2ww with mod I  Additional Goals?: Yes  Short Term Goal 6: pt will propel mod I in w/c for >500'            Bed Mobility:   Sit to Lying  Assistance Needed: Supervision or touching assistance  Comment: supervision with difficulty bringing LLE into bed  CARE Score: 4  Discharge Goal: Independent  Roll Left and Right  Assistance Needed: Independent  Comment: no bed features, uses RLE to assist LLE  CARE Score: 6  Discharge Goal: Independent  Lying to Sitting on Side of Bed  Assistance Needed: Supervision or touching assistance  Comment: handed pt leg , cues for use  CARE Score: 4  Discharge Goal: Independent    Transfers:    Sit to Stand  Assistance Needed: Supervision or touching assistance  Comment: SBA to 2ww with cues for technique  CARE Score: 4  Discharge Goal: Independent  Chair/Bed-to-Chair Transfer  Assistance Needed: Supervision or touching assistance  Comment: SBA with 2ww  CARE Score: 4  Discharge Goal: Independent     Car Transfer  Comment: feel pt would be able to attempt next session  Reason if not Attempted: Not attempted due to medical condition or safety concerns  CARE Score: 88  Discharge Goal: Independent    Ambulation:    Walking Ability  Does the Patient Walk?: Yes     Walk 10 Feet  Assistance Needed: Supervision or touching assistance  Comment: CGA with 2ww, NWB LLE, good clearance hopping  CARE Score: 4  Discharge Goal: Independent     Walk 50 Feet with Two Turns  Assistance Needed: Supervision or touching assistance  Comment: CG with 2ww, NWB LLE  CARE Score: 4  Discharge Goal: Independent     Walk 150 Feet  Comment: 54' max distance  Reason if not Attempted: Not attempted due to medical condition or safety concerns  CARE Score: 88  Discharge Goal: Supervision or touching assistance     Walking 10 Feet on Uneven Surfaces  Comment: Pt may be able to attempt next session  Reason if not Attempted: Not attempted due to medical condition or safety concerns  CARE Score: 88  Discharge Goal: Independent     1 Step (Curb)  Comment: feel pt may be able to attempt next session  Reason if not Attempted: Not attempted due to medical condition or safety concerns  CARE Score: 88  Discharge Goal: Supervision or touching assistance     4 Steps  Comment: feel pt may be able to attempt next session  Reason if not Attempted: Not attempted due to medical condition or safety concerns  CARE Score: 88  Discharge Goal: Supervision or touching assistance     12 Steps  Reason if not Attempted: Not attempted due to medical condition or safety concerns  CARE Score: 88  Discharge Goal: Not Attempted       Wheelchair:  w/c Ability: Wheelchair Ability  Uses a Wheelchair and/or Scooter?: Yes  Wheel 50 Feet with Two Turns  Assistance Needed: Supervision or touching assistance  Comment: verbal cues  CARE Score: 4  Discharge Goal: Independent  Wheel 150 Feet  Assistance Needed: Supervision or touching assistance  CARE Score: 4  Discharge Goal: Independent          Balance:        Object: Picking Up Object  Assistance Needed: Supervision or touching assistance  Comment: supervision with 2ww and reacher  CARE Score: 4  Discharge Goal: Independent    I      Exam:    Blood pressure 130/70, pulse 94, temperature 99.3 °F (37.4 °C), temperature source Oral, resp. rate 16, height 5' 5\" (1.651 m), weight 239 lb 3.2 oz (108.5 kg), SpO2 99 %. General: Semiupright in bed. Talkative. In good spirits. HEENT: Gazing right and left. Clear speech. Neck supple. MMM. Pulmonary: No wheezes, rales or rhonchi. Cardiac: RRR. Abdomen: Patient's abdomen is soft and mildly distended. Bowel sounds were present throughout. There was no rebound, guarding or masses noted. Upper extremities: Patient was able to bring both hands up overhead. He lacks deep tendon reflexes not blunted sensation in the fingers.   4+/5 MMT of power  and digit thrombocytopenia. No signs of acute blood loss. Uncontrolled pain: Scheduled Toradol for another 2 days. He has oxycodone available as well. Cryotherapy. Initially we have been avoiding acetaminophen products while struggling with cirrhosis. He reports occasional use of Tylenol as an outpatient at the direction of his gastroenterologist.  Alcoholic liver disease: Chronulac 10 g twice daily. Reconsidering the use of some Tylenol products. Cautious diuresis. Neuropathy: He may be a candidate for gabapentin. Weightbearing through his right lower limb is much as possible.

## 2022-12-02 PROCEDURE — 6370000000 HC RX 637 (ALT 250 FOR IP): Performed by: PHYSICAL MEDICINE & REHABILITATION

## 2022-12-02 PROCEDURE — 97535 SELF CARE MNGMENT TRAINING: CPT

## 2022-12-02 PROCEDURE — 6360000002 HC RX W HCPCS: Performed by: STUDENT IN AN ORGANIZED HEALTH CARE EDUCATION/TRAINING PROGRAM

## 2022-12-02 PROCEDURE — 97110 THERAPEUTIC EXERCISES: CPT

## 2022-12-02 PROCEDURE — 2580000003 HC RX 258: Performed by: STUDENT IN AN ORGANIZED HEALTH CARE EDUCATION/TRAINING PROGRAM

## 2022-12-02 PROCEDURE — 97530 THERAPEUTIC ACTIVITIES: CPT

## 2022-12-02 PROCEDURE — 6370000000 HC RX 637 (ALT 250 FOR IP): Performed by: STUDENT IN AN ORGANIZED HEALTH CARE EDUCATION/TRAINING PROGRAM

## 2022-12-02 PROCEDURE — 1280000000 HC REHAB R&B

## 2022-12-02 PROCEDURE — 97116 GAIT TRAINING THERAPY: CPT

## 2022-12-02 RX ADMIN — ENOXAPARIN SODIUM 30 MG: 100 INJECTION SUBCUTANEOUS at 08:52

## 2022-12-02 RX ADMIN — SODIUM CHLORIDE, PRESERVATIVE FREE 10 ML: 5 INJECTION INTRAVENOUS at 14:15

## 2022-12-02 RX ADMIN — ENOXAPARIN SODIUM 30 MG: 100 INJECTION SUBCUTANEOUS at 20:24

## 2022-12-02 RX ADMIN — LACTULOSE 30 G: 10 SOLUTION ORAL at 08:53

## 2022-12-02 RX ADMIN — OXYCODONE HYDROCHLORIDE 10 MG: 10 TABLET ORAL at 22:47

## 2022-12-02 RX ADMIN — Medication 1 TABLET: at 08:53

## 2022-12-02 RX ADMIN — FUROSEMIDE 60 MG: 40 TABLET ORAL at 08:53

## 2022-12-02 RX ADMIN — IBUPROFEN 400 MG: 400 TABLET ORAL at 08:52

## 2022-12-02 RX ADMIN — SPIRONOLACTONE 150 MG: 50 TABLET ORAL at 14:15

## 2022-12-02 RX ADMIN — LACTULOSE 30 G: 10 SOLUTION ORAL at 20:24

## 2022-12-02 RX ADMIN — IBUPROFEN 400 MG: 400 TABLET ORAL at 22:47

## 2022-12-02 RX ADMIN — SODIUM CHLORIDE, PRESERVATIVE FREE 10 ML: 5 INJECTION INTRAVENOUS at 21:55

## 2022-12-02 ASSESSMENT — PAIN SCALES - GENERAL
PAINLEVEL_OUTOF10: 4
PAINLEVEL_OUTOF10: 2
PAINLEVEL_OUTOF10: 3
PAINLEVEL_OUTOF10: 0
PAINLEVEL_OUTOF10: 2
PAINLEVEL_OUTOF10: 2
PAINLEVEL_OUTOF10: 1
PAINLEVEL_OUTOF10: 0
PAINLEVEL_OUTOF10: 2
PAINLEVEL_OUTOF10: 2
PAINLEVEL_OUTOF10: 3
PAINLEVEL_OUTOF10: 8
PAINLEVEL_OUTOF10: 0

## 2022-12-02 ASSESSMENT — PAIN DESCRIPTION - DESCRIPTORS
DESCRIPTORS: ACHING;DISCOMFORT
DESCRIPTORS: ACHING
DESCRIPTORS: TEARING;ACHING
DESCRIPTORS: ACHING;DISCOMFORT
DESCRIPTORS: ACHING
DESCRIPTORS: ACHING;DISCOMFORT
DESCRIPTORS: ACHING

## 2022-12-02 ASSESSMENT — PAIN DESCRIPTION - FREQUENCY
FREQUENCY: CONTINUOUS
FREQUENCY: CONTINUOUS
FREQUENCY: INTERMITTENT
FREQUENCY: CONTINUOUS

## 2022-12-02 ASSESSMENT — PAIN DESCRIPTION - PAIN TYPE
TYPE: SURGICAL PAIN

## 2022-12-02 ASSESSMENT — PAIN DESCRIPTION - LOCATION
LOCATION: ANKLE;LEG
LOCATION: KNEE
LOCATION: LEG;KNEE

## 2022-12-02 ASSESSMENT — PAIN - FUNCTIONAL ASSESSMENT
PAIN_FUNCTIONAL_ASSESSMENT: ACTIVITIES ARE NOT PREVENTED

## 2022-12-02 ASSESSMENT — PAIN DESCRIPTION - ORIENTATION
ORIENTATION: LEFT

## 2022-12-02 ASSESSMENT — PAIN SCALES - WONG BAKER
WONGBAKER_NUMERICALRESPONSE: 0
WONGBAKER_NUMERICALRESPONSE: 2
WONGBAKER_NUMERICALRESPONSE: 2
WONGBAKER_NUMERICALRESPONSE: 0
WONGBAKER_NUMERICALRESPONSE: 2

## 2022-12-02 ASSESSMENT — PAIN DESCRIPTION - ONSET
ONSET: ON-GOING

## 2022-12-02 NOTE — PROGRESS NOTES
Physical Therapy    [x] daily progress note       [] discharge       Patient Name:  Nettie Lomas   :  1970 MRN: [id number]  Room:  [address] [place] [address] Date of Admission: 2022  Rehabilitation Diagnosis:   Closed fracture of lateral portion of left tibial plateau, initial encounter [S82.122A]       Date 2022       Day of ARU Week:  4   Time IN//1030   Individual Tx Minutes 60   Group Tx Minutes    Co-Treat Minutes    Concurrent Tx Minutes    TOTAL Tx Time Mins 60   Variance Time    Variance Time []   Refusal due to:     []   Medical hold/reason:    []   Illness   []   Off Unit for test/procedure  []   Extra time needed to complete task  []   Therapeutic need  []   Other (specify):   Restrictions Restrictions/Precautions  Restrictions/Precautions: General Precautions, Weight Bearing, Fall Risk (LLE NWB, order in for immobilzer, but no order for wearing one)      Interdisciplinary communication [x]   Cleared for therapy per nursing     []   RN notified about issues during session  []   RN updated on pt performance  []   Spoke with   []   Spoke with OT  []   Spoke with MD  []   Other:    Subjective observations and cognitive status: Pt in w/c, alert, states having some sleep last night, ate breakfast, expressed frustration related to timing of pain meds and feeling \"rushed\" earlier today.     Pain level/location: 1/10       Location: LLE    Discharge recommendations  Anticipated discharge date:  2022  Destination: []home alone   []home alone with assist PRN     [x] home w/ family      [] Continuous supervision  []SNF    [] Assisted living     [] Other:  Continued therapy: [x]HHC PT  []OUTPATIENT PT   [] No Further PT  []SNF PT  Caregiver training recommended: [x]Yes  [] No   Equipment needs: 2WW, manual w/c with elevating leg rests and anti-tippers  Nettie Lomas requires the assistance of a front-wheeled walker to successfully ambulate from room to room at home to allow completion of daily living tasks such as: bathing, toileting, dressing and grooming. A wheeled walker is necessary due to the patient's unsteady gait, upper body weakness, inability to  a standard walker. This patient can ambulate only by pushing a walker instead of using a lesser assistive device such as a cane or crutch. Amos Mccollum requires a standard wheelchair to successfully complete daily living tasks such as: bathing, toileting, dressing and grooming; or any other daily living task in the home. A standard manual wheelchair is necessary due to patient's impaired ambulation and mobility restrictions and would be unable to resolve these daily living tasks using a cane or walker. The patient is capable of using/self-propelling a standard wheelchair safely in their home and can maneuver within their home with adequate access in a reasonable amount of time. The patient has not expressed an unwillingness to use the wheelchair. Having this wheelchair would enable Amos Mccollum ability to regularly participate in the above mentioned daily living tasks around the home. There is a caregiver available to provide necessary assistance. Expected length of need is at least 6 mos. Amos Mccollum does not have any infectious diagnoses. Amos Mccollum can self propel a wheelchair and needs anti-rollback device because of ramps.      Amos Mccollum requires elevated leg rests due to a musculoskeletal condition which prevents 90 degree flexion in the knee and due to significant edema of the lower extremeties that requires an elevating leg rest.     PT IRF-JUDE scores and goals for discharge assessment:     Sit to Stand  Assistance Needed: Supervision or touching assistance  Comment: SBA using 2WW following NWB on LLE  CARE Score: 4  Discharge Goal: Independent    Chair/Bed-to-Chair Transfer  Assistance Needed: Supervision or touching assistance  Comment: CGA using 2WW following NWB LLE  CARE Score: 4  Discharge Goal: Independent      Walk 10 Feet? Walk 10 Feet?: Yes    Wheelchair Ability  Uses a Wheelchair and/or Scooter?: Yes    Wheel 50 Feet with Two Turns  Assistance Needed: Independent  Comment: Mod Ind using BUE  CARE Score: 6  Discharge Goal: Independent    Wheel 150 Feet  Assistance Needed: Independent  Comment: Mod Ind using BUE  CARE Score: 6  Discharge Goal: Independent              Walking Ability  Does the Patient Walk?: Yes    Walk 10 Feet  Assistance Needed: Supervision or touching assistance  Comment: SBA using 2WW following NWB on LLE; tends to be impulsive requiring VCs to slow down pace for safety  CARE Score: 4  Discharge Goal: Independent    Walk 50 Feet with Two Turns  Assistance Needed: Supervision or touching assistance  Comment: CGA using 2WW following NWB on LLE; tends to be impulsive requiring VCs to slow down pace for safety  CARE Score: 4  Discharge Goal: Independent    Walk 150 Feet  Comment: max tolerance was 65 ft (limited by fatigue)  Reason if not Attempted: Not attempted due to medical condition or safety concerns  CARE Score: 88  Discharge Goal: Supervision or touching assistance    Additional Therapeutic activities/exercises completed this date:     []   Nu-step:  Time:        Level:         #Steps:       []   Rebounder:    []  Seated     []  Standing        []   Balance training         []   Postural training    []   Supine ther ex (reps/sets):     [x]   Seated ther ex (reps/sets):  AROM L ankle PF/DF x 20 reps, AAROM L knee flexion/extension using knee glider x 20 reps, AROM L hip marches x 20 reps; R hip marches x 20 reps, R LAQ with 5# ankle weight x 5 sec hold x 10 reps x 2 sets, AROM L leg kicks x 10 reps x 2 sets    [x]   Standing ther ex (reps/sets) with BUE support in // bars: R DF/PF x 20 reps, R mini squats x 10 reps x 2 sets   []   Other:  Toileting activity completed with    []   Other:    Comments:      Patient/Caregiver Education and Training:   []   Role of PT  [x]   Education about Dx and phases of bone healing   []   Use of call light for assist   []   HEP provided and explained   [x]   Treatment plan reviewed  [x]   Home safety  []   Wheelchair mobility/management   []   Body mechanics  []   Bed Mobility/Transfer technique  [x]   Gait technique/sequencing  []   Proper use of assistive device/adaptive equipment  []   Stair training/Advanced mobility safety and technique  []   Reinforced patient's precautions/mobility while maintaining precautions  []   Postural awareness  []   Family/caregiver training  []   Progress was updated and reviewed in Rehabtracker with patient and/or family this date. []   Other:    Treatment Plan for Next Session: 2\" curb step training (recommend 2nd person assist for safety); object retrieval activity, transfers training; progress >/=50 ft level surface ambulation to SBA->Sup if able and safe     Assessment: This pt demonstrated a positive response to today's treatment as evidenced by tolerance to gentle ROM exercises on LLE and increased ambulation tolerance over level surface. The patient is making good progress toward established goals as evidenced by QI scores.      Treatment/Activity Tolerance:   [] Tolerated treatment with no adverse effects    [x] Patient limited by fatigue  [] Patient limited by pain   [] Patient limited by medical complications:    [] Adverse reaction to Tx:   [] Significant change in status    Safety:       []  bed alarm set    []  chair alarm set    []  Pt refused alarms                []  Telesitter activated      [x]  Gait belt used during tx session      [x]other: pt left in w/c under OT's care      Number of Minutes/Billable Intervention  Gait Training 15   Therapeutic Exercise 30   Neuro Re-Ed    Therapeutic Activity 15   Wheelchair Propulsion    Group    Other:    TOTAL 60     Social History  Social/Functional History  Lives With: Alone  Type of Home: House  Home Layout: One level  Home Access: Stairs to enter without rails  Entrance Stairs - Number of Steps: 1 (from front)would have to walk through grass, 2 steps through garage  Entrance Stairs - Rails: None  Bathroom Shower/Tub: Tub/Shower unit  Bathroom Toilet: Standard  Bathroom Equipment: Shower chair, Grab bars in shower  Bathroom Accessibility:  (pt unsure if bathroom big enough for walker)  Home Equipment: Efraín Archibald  Has the patient had two or more falls in the past year or any fall with injury in the past year?: Yes (Pt has fallen 2-3 times in past year, this fall only one with injury)  Receives Help From: Family (pt needed assist with cleaning and anything requiring prolonged standing or activity tolerance due to B foot pain and decreased endurance)  ADL Assistance: Independent  Homemaking Assistance: Needs assistance  Homemaking Responsibilities: Yes  Meal Prep Responsibility: Primary  Laundry Responsibility: Secondary (family primarily does)  Cleaning Responsibility: Secondary (pt only performs light cleaning occasionally, family performs the rest)  Bill Paying/Finance Responsibility: Primary  Shopping Responsibility: Primary  Health Care Management: Primary  Ambulation Assistance: Independent (no device in house or to go to mailbox (400' round trip))  Transfer Assistance: Independent  Active : Yes  Mode of Transportation: Truck  Occupation: On disability  Type of Occupation: Pt on disability due to cirrhosis  Additional Comments: pt admits he only cleans up himself or house when he absolutely has to at baseline.  sleeps in regular flat bed, but cannot sleep well on back    Objective                                                                                    Goals:  (Update in navigator)  Short Term Goals  Time Frame for Short Term Goals: 7-10 days  Short Term Goal 1: pt to complete all bed mobility mod I  Short Term Goal 2: Pt to complete sit to stand to/from bed, commode, and car transfers with  mod I  Short Term Goal 3: Pt will ambulate 48' on level surfaces and 10' on unlevel surfaces with 2ww and NWB LLE with mod I, supervision for 150' on level surface  Short Term Goal 4: Pt will ascend/descend curb step with 2ww and 4 steps with rail with CGA  Short Term Goal 5: Pt will  object with reacher and 2ww with mod I  Additional Goals?: Yes  Short Term Goal 6: pt will propel mod I in w/c for >500':   :        Plan of Care                                                                              Times per week: 5 days per week for a minimum of 60 minutes/day plus group as appropriate for 60 minutes.   Treatment to include Current Treatment Recommendations: Strengthening, Balance training, Functional mobility training, Transfer training, Endurance training, IADL training, Gait training, Stair training, Neuromuscular re-education, Return to work related activity, Pain management, Patient/Caregiver education & training, Equipment evaluation, education, & procurement, Therapeutic activities, Positioning, Safety education & training    Electronically signed by   Alisson Shaw PT  12/2/2022, 12:38 PM

## 2022-12-02 NOTE — PROGRESS NOTES
Kiera Dickey    : 1970  Acct #: [de-identified]  MRN: [id number]              PM&R Progress Note      Admitting diagnosis: Left lateral tibial plateau fracture ([address])     Comorbid diagnoses impacting rehabilitation: Generalized weakness, gait disturbance, uncontrolled pain, alcoholic liver disease, cirrhosis, thrombocytopenia, alcoholic neuropathy    Chief complaint: Some throbbing in his left lower leg. No chills or cough. Prior (baseline) level of function: Independent. Current level of function:         Current  IRF-JUDE and Goals:   Occupational Therapy:    Short Term Goals  Time Frame for Short Term Goals: STGs=LTGs :   Long Term Goals  Time Frame for Long Term Goals : 7-10 days or until d/c. Long Term Goal 1: Pt will complete self feeding with Mod I.  Long Term Goal 2: Pt will complete oral hygiene and grooming tasks with Mod I.  Long Term Goal 3: Pt will complete total body bathing with AE PRN and Mod I.  Long Term Goal 4: Pt will complete UB dressing with IND. Long Term Goal 5: Pt will complete LB dressing with AE PRN and Mod I. Additional Goals?: Yes  Long Term Goal 6: Pt will doff/don footwear with AE PRN and Mod I.  Long Term Goal 7: Pt will perform toileting with Mod I.  Long Term Goal 8: Pt will perform functional transfers (bed, chair, toilet, shower) with DME PRN and Mod I.  Long Term Goal 9: Pt will perform therex/therax to facilitate an increase in strength/endurance (with emphasis on dynamic standing balance/tolerance > 8 mins) with Mod I.  Long Term Goal 10: Pt will perform meal prep with Mod I. :                                       Eating: Eating  Assistance Needed: Setup or clean-up assistance  Comment: Pt reports occasional assist opening containers, able to feed self IND. CARE Score: 5  Discharge Goal: Independent       Oral Hygiene: Oral Hygiene  Assistance Needed: Setup or clean-up assistance  Comment: Setup assist seated sinkside.   CARE Score: 5  Discharge Goal: Independent    UB/LB Bathing: Shower/Bathe Self  Assistance Needed: Partial/moderate assistance  Comment: Pt completed full shower, seated for entirety, weight shifting to wash rear. Pt requires assist washing distal LLE. CARE Score: 3  Discharge Goal: Independent    UB Dressing: Upper Body Dressing  Assistance Needed: Supervision or touching assistance  Comment: Sup to don pullover shirt. CARE Score: 4  Discharge Goal: Independent         LB Dressing: Lower Body Dressing  Assistance Needed: Supervision or touching assistance  Comment: Pt able to thread BLEs into pajama pants with use of reacher for LLE and able to manage over hips in bed with supervision. CARE Score: 4  Discharge Goal: Independent    Donning and [place] Footwear: Putting On/Taking Off Footwear  Assistance Needed: Supervision or touching assistance  Comment: Pt able to don sock and shoe on R foot using figure four method sitting EOB; Pt educated on use of sock aide and able to perform after demo on L foot. CARE Score: 4  Discharge Goal: Independent      Toiletin [address] needed: Supervision or touching assistance  Comment: CGA in stance to perform clothing management and bowel hygiene as Pt shaky balancing self on the R foot following NWB precautions on L. CARE Score: 4  Discharge Goal: Independent      Toilet Transfers: Toilet Transfer  Assistance needed: Supervision or touching assistance  Comment: CGA with use of 2ww and Max verbal cues for safety as Pt impulsive with urgency.   CARE Score: 4  Discharge Goal: Independent    Physical Therapy:   Short Term Goals  Time Frame for Short Term Goals: 7-10 days  Short Term Goal 1: pt to complete all bed mobility mod I  Short Term Goal 2: Pt to complete sit to stand to/from bed, commode, and car transfers with  mod I  Short Term Goal 3: Pt will ambulate 50' on level surfaces and 10' on unlevel surfaces with 2ww and NWB LLE with mod I, supervision for 150' on level surface  Short Term Goal 4: Pt will ascend/descend curb step with 2ww and 4 steps with rail with CGA  Short Term Goal 5: Pt will  object with reacher and 2ww with mod I  Additional Goals?: Yes  Short Term Goal 6: pt will propel mod I in w/c for >500'            Bed Mobility:   Sit to Lying  Assistance Needed: Supervision or touching assistance  Comment: supervision with difficulty bringing LLE into bed  CARE Score: 4  Discharge Goal: Independent  Roll Left and Right  Assistance Needed: Independent  Comment: no bed features, uses RLE to assist LLE  CARE Score: 6  Discharge Goal: Independent  Lying to Sitting on Side of Bed  Assistance Needed: Supervision or touching assistance  Comment: handed pt leg , cues for use  CARE Score: 4  Discharge Goal: Independent    Transfers:    Sit to Stand  Assistance Needed: Supervision or touching assistance  Comment: CGA using 2WW following NWB LLE  CARE Score: 4  Discharge Goal: Independent  Chair/Bed-to-Chair Transfer  Assistance Needed: Supervision or touching assistance  Comment: CGA using 2WW following NWB LLE  CARE Score: 4  Discharge Goal: Independent     Car Transfer  Assistance Needed: Supervision or touching assistance  Comment: CGA using 2WW following NWB LLE  Reason if not Attempted: Not attempted due to medical condition or safety concerns  CARE Score: 4  Discharge Goal: Independent    Ambulation:    Walking Ability  Does the Patient Walk?: Yes     Walk 10 Feet  Assistance Needed: Supervision or touching assistance  Comment: CGA using 2WW following NWB LLE  CARE Score: 4  Discharge Goal: Independent     Walk 50 Feet with Two Turns  Assistance Needed: Supervision or touching assistance  Comment: CG with 2ww, NWB LLE  CARE Score: 4  Discharge Goal: Independent     Walk 150 Feet  Comment: 54' max distance  Reason if not Attempted: Not attempted due to medical condition or safety concerns  CARE Score: 88  Discharge Goal: Supervision or touching assistance Walking 10 Feet on Uneven Surfaces  Assistance Needed: Supervision or touching assistance  Comment: CGA using 2WW following NWB on LLE  Reason if not Attempted: Not attempted due to medical condition or safety concerns  CARE Score: 4  Discharge Goal: Independent     1 Step (Curb)  Comment: trained to ascend/descend 4\" curb step using // bars with Min A following NWB on LLE; pt reports increased fatigue and pain on LLE after 2 trials of this activity  Reason if not Attempted: Not attempted due to medical condition or safety concerns  CARE Score: 88  Discharge Goal: Supervision or touching assistance     4 Steps  Comment: feel pt may be able to attempt next session  Reason if not Attempted: Not attempted due to medical condition or safety concerns  CARE Score: 88  Discharge Goal: Supervision or touching assistance     12 Steps  Reason if not Attempted: Not attempted due to medical condition or safety concerns  CARE Score: 88  Discharge Goal: Not Attempted       Wheelchair:  w/c Ability: Wheelchair Ability  Uses a Wheelchair and/or Scooter?: Yes  Wheel 50 Feet with Two Turns  Assistance Needed: Independent  Comment: Mod Ind using BUE  CARE Score: 6  Discharge Goal: Independent  Wheel 150 Feet  Assistance Needed: Independent  Comment: Mod Ind using BUE  CARE Score: 6  Discharge Goal: Independent          Balance:        Object: Picking Up Object  Assistance Needed: Supervision or touching assistance  Comment: supervision with 2ww and reacher  CARE Score: 4  Discharge Goal: Independent    I      Exam:    Blood pressure 113/60, pulse 91, temperature 98.8 °F (37.1 °C), temperature source Oral, resp. rate 16, height 5' 5\" (1.651 m), weight 238 lb (108 kg), SpO2 98 %. General: Semiupright in bed. Left leg elevated with ice on it. HEENT: Gazing right and left. Clear speech. Neck supple. Pulmonary: Clear and unlabored. Cardiac: Regular rate and rhythm.     Abdomen: Patient's abdomen is soft and nondistended. Bowel sounds were present throughout. There was no rebound, guarding or masses noted. Upper extremities: Slowly moving his upper limbs to functional range of motion. Normal tone without tremor. Lower extremities: Less erythema about the left lower leg. Swelling persist.  (His venous Doppler study was negative). Sensation intact. Sitting balance was good. Standing balance was poor. Lab Results   Component Value Date    WBC 6.7 11/27/2022    HGB 12.7 (L) 11/27/2022    HCT 36.9 (L) 11/27/2022    MCV 96.3 11/27/2022     11/27/2022     Lab Results   Component Value Date    INR 1.15 11/27/2022    PROTIME 14.9 (H) 11/27/2022     Lab Results   Component Value Date    CREATININE 0.6 (L) 11/27/2022    BUN 13 11/27/2022     (L) 11/27/2022    K 3.6 11/27/2022    CL 97 (L) 11/27/2022    CO2 27 11/27/2022     No results found for: ALT, AST, GGT, ALKPHOS, BILITOT    Expected length of stay  prior to a supervised level of function for discharge home with a walker and [facility] OT/PT is 12/7/2022. Recommendations:    Left lateral tibial plateau fracture with gait disturbance: When we time his analgesics right, he does engage well in the activities of the daily occupational and physical therapy. Working on techniques for self-care and mobility maintaining no weightbearing through the left lower limb. Immobilizer on the left knee at all times. His incision shows no signs of infection. Less erythema today and a negative venous Doppler study yesterday. Ongoing aggressive pulmonary hygiene measures, nutritional support, pain management and DVT prophylaxis. Outpatient follow-up with orthopedics in 2 weeks. His left lower limb remains swollen but it seems like what I would expect at this point. Verbal cues and minimum physical assistance for transfers. .  DVT prophylaxis: Lovenox 30 mg SQ twice daily. His surgeon has committed him to a 6-week course.   Planning on instruction for self administration of this medication. Monitoring his hemoglobin and platelet count periodically while on this medication. Close monitoring of his thrombocytopenia. No new bruising or swelling. Uncontrolled pain: Completing his Toradol today. He has oxycodone available as well. Cryotherapy. Initially we have been avoiding acetaminophen products while struggling with cirrhosis. He reports occasional use of Tylenol as an outpatient at the direction of his gastroenterologist.  Sometimes uses ibuprofen as well. Alcoholic liver disease: Chronulac 10 g twice daily. Cautious use of Tylenol products. Cautious diuresis. Neuropathy: Holding off with gabapentin. Weightbearing through his right lower limb is much as possible. Counseling and Coordination of Care: In care conference today I met with the patient's OT, PT, RN and . We discussed the patient's problems, progress and prognosis. Disposition issues were clarified and plans were established for ongoing rehabilitation efforts beyond the ARU stay. I reviewed this information with the patient during a second distinct visit with the patient. More than half of the total time of 33 minutes spent with the patient involved counseling and coordination of care.

## 2022-12-02 NOTE — PLAN OF CARE
Problem: Discharge Planning  Goal: Discharge to home or other facility with appropriate resources  12/2/2022 1116 by José Miguel Ferro RN  Outcome: Progressing  12/2/2022 0125 by Gini Mack RN  Outcome: Progressing     Problem: Skin/Tissue Integrity  Goal: Absence of new skin breakdown  Description: 1. Monitor for areas of redness and/or skin breakdown  2. Assess vascular access sites hourly  3. Every 4-6 hours minimum:  Change oxygen saturation probe site  4. Every 4-6 hours:  If on nasal continuous positive airway pressure, respiratory therapy assess nares and determine need for appliance change or resting period.   12/2/2022 1116 by José Miguel Ferro RN  Outcome: Progressing  12/2/2022 0125 by Gini Mack RN  Outcome: Progressing     Problem: Safety - Adult  Goal: Free from fall injury  12/2/2022 1116 by José Miguel Ferro RN  Outcome: Progressing  12/2/2022 0125 by Gini Mack RN  Outcome: Progressing     Problem: Pain  Goal: Verbalizes/displays adequate comfort level or baseline comfort level  12/2/2022 1116 by José Miguel Ferro RN  Outcome: Progressing  12/2/2022 0125 by Gini Mack RN  Outcome: Progressing     Problem: ABCDS Injury Assessment  Goal: Absence of physical injury  12/2/2022 1116 by José Miguel Ferro RN  Outcome: Progressing  12/2/2022 0125 by Gini Mack RN  Outcome: Progressing     Problem: Nutrition Deficit:  Goal: Optimize nutritional status  12/2/2022 1116 by José Miguel Ferro RN  Outcome: Progressing  12/2/2022 0125 by Gini Mack RN  Outcome: Progressing

## 2022-12-02 NOTE — PLAN OF CARE
Problem: Discharge Planning  Goal: Discharge to home or other facility with appropriate resources  12/2/2022 0125 by Luis F Duran RN  Outcome: Progressing  12/1/2022 1201 by Shamar Fofana RN  Outcome: Progressing     Problem: Skin/Tissue Integrity  Goal: Absence of new skin breakdown  Description: 1. Monitor for areas of redness and/or skin breakdown  2. Assess vascular access sites hourly  3. Every 4-6 hours minimum:  Change oxygen saturation probe site  4. Every 4-6 hours:  If on nasal continuous positive airway pressure, respiratory therapy assess nares and determine need for appliance change or resting period.   12/2/2022 0125 by Luis F Duran RN  Outcome: Progressing  12/1/2022 1201 by Shamar Fofana RN  Outcome: Progressing     Problem: Safety - Adult  Goal: Free from fall injury  12/2/2022 0125 by Luis F Duran RN  Outcome: Progressing  12/1/2022 1201 by Shamar Fofana RN  Outcome: Progressing     Problem: Pain  Goal: Verbalizes/displays adequate comfort level or baseline comfort level  12/2/2022 0125 by Luis F Duran RN  Outcome: Progressing  12/1/2022 1201 by Shamar Fofana RN  Outcome: Progressing     Problem: ABCDS Injury Assessment  Goal: Absence of physical injury  12/2/2022 0125 by Luis F Duran RN  Outcome: Progressing  12/1/2022 1201 by Shamar Fofana RN  Outcome: Progressing     Problem: Nutrition Deficit:  Goal: Optimize nutritional status  12/2/2022 0125 by Luis F Duran RN  Outcome: Progressing  12/1/2022 1201 by Shamar Fofana RN  Outcome: Progressing

## 2022-12-02 NOTE — PROGRESS NOTES
Occupational Therapy    Physical Rehabilitation: OCCUPATIONAL THERAPY     [x] daily progress note       [] discharge       Patient Name:  Kady Gregg   :  1970 MRN: [id number]  Room:  [address] [place] [address] Date of Admission: 2022  Rehabilitation Diagnosis:   Closed fracture of lateral portion of left tibial plateau, initial encounter [S82.122A]       Date 2022       Day of ARU Week:  4   Time IN//930  1035/1135   Individual Tx Minutes 60 + 60   Group Tx Minutes    Co-Treat Minutes    Concurrent Tx Minutes    TOTAL Tx Time Mins 120   Variance Time    Variance Time []   Refusal due to:     []   Medical hold/reason:    []   Illness   []   Off Unit for test/procedure  []   Extra time needed to complete task  []   Therapeutic need  []   Other (specify):   Restrictions Restrictions/Precautions: General Precautions, Weight Bearing, Fall Risk (LLE NWB, order in for immobilzer, but no order for wearing one)         Communication with other providers: [x]   OK to see per nursing:     []   Spoke with team member regarding:      Subjective observations and cognitive status: 1st: Pt in supine upon entrance with LLE elevated on pillows, Pt frustrated that he has not been given his Ibuprofen or given ice bags. Pt verbose with therapist trying to redirect Pt. Pt then states I have not gotten to touch my breakfast with this therapist indicating Pt's breakfast has been present in the room for an hour. Later on in session, Pt showed therapist a video featuring him and his mother regarding ramp placement in his home that he would like played in care conference. 2nd session: Pt received from physical therapist in therapy gym. Pt pleasant and agreeable to therapy.         Pain level/location:   1-5 /10 depending on rest/movement      Location: L ankle, foot, lower leg, knee    Discharge recommendations  Anticipated discharge date:    Destination: []home alone   [x]home alone w assist prn   [] home w/ family [] Continuous supervision       []SNF    [] Assisted living     [] Other:   Continued therapy: [x][facility]HC OT  []OUTPATIENT  OT   [] No Further OT  Equipment needs: Brittny Mulligan requires the assistance of a tub transfer bench to successfully and safely complete bathing activities necessary due to reduced balance, endurance, need for ADL assist, and LE weakness and reduced ROM. These tasks cannot be safely completed without this device and would place Brittny Mulligan at greater risk of falls. ADLs:    Eating: Eating  Assistance Needed: Independent  Comment: Pt was able to open packages and containers to eat breakfast IND. CARE Score: 6  Discharge Goal: Independent       UB Dressing: Upper Body Dressing  Assistance Needed: Supervision or touching assistance  Comment: Sup to don pullover shirt. CARE Score: 4  Discharge Goal: Independent         LB Dressing: Lower Body Dressing  Assistance Needed: Supervision or touching assistance  Comment: Pt able to thread BLEs into pajama pants without AE to manage over hips in stance c SBA. CARE Score: 4  Discharge Goal: Independent    Donning and [place] Footwear: Putting On/Taking Off Footwear  Assistance Needed: Supervision or touching assistance  Comment: Sup to don B socks at bed level as well as R shoe. CARE Score: 4  Discharge Goal: Independent      Toiletin [address] needed: Supervision or touching assistance  Comment: CGA for clothing management to perform episode of urination in stance at toilet.   CARE Score: 4  Discharge Goal: Independent        Bed Mobility:           []   Pt received out of bed   Scooting:  IND  Supine --> Sit:  IND  Sit --> Supine:  IND    Transfers:    Sit--> Stand:  CG/SBA  Stand --> Sit:   CG/SBA  Stand-Pivot:   CGA  Other:  TTB in tub shower CGA  Assistive device required for transfer:   2WW      Functional Mobility:    Assistance:  Pt able to propel self Mod I 130 ft x 2 as well as shorter distances 20-30 ft x2.  Device:   []   Link [place]     []   Standard Breanna Salas [x]   Wheelchair        []   [address] [facility]       []   4-Wheeled Breanna Franciss         []   Cardiac Breanna Schillings       []   Other:        Additional Therapeutic activities/exercises completed this date:     [x]   ADL Training   [x]   Balance/Postural training     [x]   Bed/Transfer Training - Pt gave very detailed description of bathroom setup at home with OT attempting to simulate environment and have Pt practice bathroom mobility with 2ww and transfers on and off TTB with CGA. Extended time. [x]   Endurance Training   []   Neuromuscular Re-ed   []   Nu-step:  Time:        Level:         #Steps:       []   Rebounder:    []  Seated     []  Standing        []   Supine Ther Ex (reps/sets):     [x]   Seated Ther Ex (reps/sets):  Pt performed 20 mins on arm bike with Mod resistance, averaging 75 rpm. Pt required no rest breaks. To further increase BUE endurance for ADLs and transfers, pt completed 1 set x10 reps of the following exercises using purple theraband: alternating shoulder presses, chest presses, and simultaneous shoulder horizontal abduction/adduction. Educated pt on HEP at d/c to increase/maintain BUE endurance, pt verbalized understanding. []   Standing Ther Ex (reps/sets):     []   Other:      Comments: All intervention performed to increase pt's endurance, ax tolerance, balance, strength, and I c ADLs/IADLs and functional transfers/mobility. Patient/Caregiver Education and Training:   [x]   Adaptive Equipment Use  [x]   Bed Mobility/Transfer Technique/Safety  []   Energy Conservation Tips  []   Family training  []   Postural Awareness  [x]   Safety During Functional Activities - Pt demonstrates impulsive/quick movements requiring verbal cues throughout session for safety. []   Reinforced Patient's Precautions   []   Progress was updated and reviewed in Rehabtracker with patient and/or family this         date.     Treatment Plan for Next Session: Continue OT POC, progress bathing with use of LHS. Assessment: This pt demonstrated a positive response to today's treatment as evidenced by good demo of TTB transfers as Pt will be completing at home. The patient is making good progress toward established goals as evidenced by QI scores. Ongoing deficits are observed in the areas of functional balance, strength, FMC, sensation, pain, and ADLs and continued focus on this is recommended.        Treatment/Activity Tolerance:   [x] Tolerated treatment with no adverse effects    [] Patient limited by fatigue  [] Patient limited by pain   [] Patient limited by medical complications:    [] Adverse reaction to Tx:   [] Significant change in status    Safety:       []  bed alarm set    []  chair alarm set    []  Pt refused alarms                []  Telesitter activated      [x]  Gait belt used during tx session      []other:       Number of Minutes/Billable Intervention  Therapeutic Exercise 30   ADL Self-care 45   Neuro Re-Ed    Therapeutic Activity 45   Group    Other:    TOTAL 120       Social History  Social/Functional History  Lives With: Alone  Type of Home: House  Home Layout: One level  Home Access: Stairs to enter without rails  Entrance Stairs - Number of Steps: 1 (from front)would have to walk through grass, 2 steps through garage  Entrance Stairs - Rails: None  Bathroom Shower/Tub: Tub/Shower unit  Bathroom Toilet: Standard  Bathroom Equipment: Shower chair, Grab bars in shower  Bathroom Accessibility:  (pt unsure if bathroom big enough for walker)  Home Equipment: [employer] [place], Crutches  Has the patient had two or more falls in the past year or any fall with injury in the past year?: Yes (Pt has fallen 2-3 times in past year, this fall only one with injury)  Receives Help From: Family (pt needed assist with cleaning and anything requiring prolonged standing or activity tolerance due to B foot pain and decreased endurance)  ADL Assistance: Independent  Homemaking Assistance: Needs assistance  Homemaking Responsibilities: Yes  Meal Prep Responsibility: Primary  Laundry Responsibility: Secondary (family primarily does)  Cleaning Responsibility: Secondary (pt only performs light cleaning occasionally, family performs the rest)  Bill Paying/Finance Responsibility: Primary  Shopping Responsibility: Primary  Health Care Management: Primary  Ambulation Assistance: Independent (no device in house or to go to mailbox (400' round trip))  Transfer Assistance: Independent  Active : Yes  Mode of Transportation: Truck  Occupation: On disability  Type of Occupation: Pt on disability due to cirrhosis  Additional Comments: pt admits he only cleans up himself or house when he absolutely has to at baseline. sleeps in regular flat bed, but cannot sleep well on back    Objective                                                                                    Goals:  (Update in navigator)  Short Term Goals  Time Frame for Short Term Goals: STGs=LTGs:  Long Term Goals  Time Frame for Long Term Goals : 7-10 days or until d/c. Long Term Goal 1: Pt will complete self feeding with Mod I.  Long Term Goal 2: Pt will complete oral hygiene and grooming tasks with Mod I.  Long Term Goal 3: Pt will complete total body bathing with AE PRN and Mod I.  Long Term Goal 4: Pt will complete UB dressing with IND. Long Term Goal 5: Pt will complete LB dressing with AE PRN and Mod I. Additional Goals?: Yes  Long Term Goal 6: Pt will doff/don footwear with AE PRN and Mod I.  Long Term Goal 7: Pt will perform toileting with Mod I.  Long Term Goal 8: Pt will perform functional transfers (bed, chair, toilet, shower) with DME PRN and Mod I.  Long Term Goal 9: Pt will perform therex/therax to facilitate an increase in strength/endurance (with emphasis on dynamic standing balance/tolerance > 8 mins) with Mod I.  Long Term Goal 10:  Pt will perform meal prep with Mod I.:        Plan of Care Times per week: 5 days per week for a minimum of 60 minutes/day plus group as appropriate for 60 minutes.   Treatment to include Occupational Therapy Plan  Current Treatment Recommendations: Strengthening, ROM, Balance training, Functional mobility training, Endurance training, Pain management, Safety education & training, Patient/Caregiver education & training, Equipment evaluation, education, & procurement, Positioning, Self-Care / ADL, Home management training, Coordination training    Electronically signed by   Alejandro Yoder24 [facility] NORBERT PetersR/L #[id number]  12/2/2022, 12:18 PM

## 2022-12-03 LAB
GLUCOSE BLD-MCNC: 114 MG/DL (ref 70–99)
GLUCOSE BLD-MCNC: 118 MG/DL (ref 70–99)
GLUCOSE BLD-MCNC: 122 MG/DL (ref 70–99)
GLUCOSE BLD-MCNC: 133 MG/DL (ref 70–99)

## 2022-12-03 PROCEDURE — 97535 SELF CARE MNGMENT TRAINING: CPT

## 2022-12-03 PROCEDURE — 6370000000 HC RX 637 (ALT 250 FOR IP): Performed by: STUDENT IN AN ORGANIZED HEALTH CARE EDUCATION/TRAINING PROGRAM

## 2022-12-03 PROCEDURE — 82962 GLUCOSE BLOOD TEST: CPT

## 2022-12-03 PROCEDURE — 1280000000 HC REHAB R&B

## 2022-12-03 PROCEDURE — 6360000002 HC RX W HCPCS: Performed by: STUDENT IN AN ORGANIZED HEALTH CARE EDUCATION/TRAINING PROGRAM

## 2022-12-03 PROCEDURE — 2580000003 HC RX 258: Performed by: STUDENT IN AN ORGANIZED HEALTH CARE EDUCATION/TRAINING PROGRAM

## 2022-12-03 PROCEDURE — 97110 THERAPEUTIC EXERCISES: CPT

## 2022-12-03 PROCEDURE — 94761 N-INVAS EAR/PLS OXIMETRY MLT: CPT

## 2022-12-03 PROCEDURE — 6370000000 HC RX 637 (ALT 250 FOR IP): Performed by: PHYSICAL MEDICINE & REHABILITATION

## 2022-12-03 PROCEDURE — 97530 THERAPEUTIC ACTIVITIES: CPT

## 2022-12-03 PROCEDURE — 97116 GAIT TRAINING THERAPY: CPT

## 2022-12-03 RX ADMIN — SPIRONOLACTONE 150 MG: 50 TABLET ORAL at 14:13

## 2022-12-03 RX ADMIN — ENOXAPARIN SODIUM 30 MG: 100 INJECTION SUBCUTANEOUS at 21:38

## 2022-12-03 RX ADMIN — IBUPROFEN 400 MG: 400 TABLET ORAL at 06:15

## 2022-12-03 RX ADMIN — IBUPROFEN 400 MG: 400 TABLET ORAL at 21:41

## 2022-12-03 RX ADMIN — FUROSEMIDE 60 MG: 40 TABLET ORAL at 08:28

## 2022-12-03 RX ADMIN — OXYCODONE HYDROCHLORIDE 5 MG: 5 TABLET ORAL at 06:16

## 2022-12-03 RX ADMIN — Medication 1 TABLET: at 08:28

## 2022-12-03 RX ADMIN — ENOXAPARIN SODIUM 30 MG: 100 INJECTION SUBCUTANEOUS at 08:28

## 2022-12-03 RX ADMIN — SODIUM CHLORIDE, PRESERVATIVE FREE 10 ML: 5 INJECTION INTRAVENOUS at 21:38

## 2022-12-03 RX ADMIN — OXYCODONE HYDROCHLORIDE 5 MG: 5 TABLET ORAL at 21:41

## 2022-12-03 RX ADMIN — OXYCODONE HYDROCHLORIDE 10 MG: 10 TABLET ORAL at 14:13

## 2022-12-03 RX ADMIN — SODIUM CHLORIDE, PRESERVATIVE FREE 10 ML: 5 INJECTION INTRAVENOUS at 08:23

## 2022-12-03 RX ADMIN — LACTULOSE 30 G: 10 SOLUTION ORAL at 21:38

## 2022-12-03 RX ADMIN — LACTULOSE 30 G: 10 SOLUTION ORAL at 08:27

## 2022-12-03 ASSESSMENT — PAIN SCALES - WONG BAKER
WONGBAKER_NUMERICALRESPONSE: 0
WONGBAKER_NUMERICALRESPONSE: 2
WONGBAKER_NUMERICALRESPONSE: 0
WONGBAKER_NUMERICALRESPONSE: 2

## 2022-12-03 ASSESSMENT — PAIN DESCRIPTION - LOCATION
LOCATION: LEG
LOCATION: INCISION;LEG
LOCATION: LEG

## 2022-12-03 ASSESSMENT — PAIN SCALES - GENERAL
PAINLEVEL_OUTOF10: 4
PAINLEVEL_OUTOF10: 4
PAINLEVEL_OUTOF10: 0
PAINLEVEL_OUTOF10: 7
PAINLEVEL_OUTOF10: 0

## 2022-12-03 ASSESSMENT — PAIN DESCRIPTION - ORIENTATION
ORIENTATION: LEFT

## 2022-12-03 ASSESSMENT — PAIN - FUNCTIONAL ASSESSMENT
PAIN_FUNCTIONAL_ASSESSMENT: PREVENTS OR INTERFERES SOME ACTIVE ACTIVITIES AND ADLS
PAIN_FUNCTIONAL_ASSESSMENT: PREVENTS OR INTERFERES SOME ACTIVE ACTIVITIES AND ADLS
PAIN_FUNCTIONAL_ASSESSMENT: PREVENTS OR INTERFERES WITH MANY ACTIVE NOT PASSIVE ACTIVITIES

## 2022-12-03 ASSESSMENT — PAIN DESCRIPTION - DESCRIPTORS
DESCRIPTORS: ACHING;DISCOMFORT
DESCRIPTORS: THROBBING;SHARP

## 2022-12-03 NOTE — PROGRESS NOTES
Leandro Tinoco    : 1970  Acct #: [de-identified]  MRN: [id number]              PM&R Progress Note      Admitting diagnosis: Left lateral tibial plateau fracture ([address])     Comorbid diagnoses impacting rehabilitation: Generalized weakness, gait disturbance, uncontrolled pain, alcoholic liver disease, cirrhosis, thrombocytopenia, alcoholic neuropathy    Chief complaint: No nausea. He requests some ibuprofen to augment his analgesics. Prior (baseline) level of function: Independent. Current level of function:         Current  IRF-JUDE and Goals:   Occupational Therapy:    Short Term Goals  Time Frame for Short Term Goals: STGs=LTGs :   Long Term Goals  Time Frame for Long Term Goals : 7-10 days or until d/c. Long Term Goal 1: Pt will complete self feeding with Mod I.  Long Term Goal 2: Pt will complete oral hygiene and grooming tasks with Mod I.  Long Term Goal 3: Pt will complete total body bathing with AE PRN and Mod I.  Long Term Goal 4: Pt will complete UB dressing with IND. Long Term Goal 5: Pt will complete LB dressing with AE PRN and Mod I. Additional Goals?: Yes  Long Term Goal 6: Pt will doff/don footwear with AE PRN and Mod I.  Long Term Goal 7: Pt will perform toileting with Mod I.  Long Term Goal 8: Pt will perform functional transfers (bed, chair, toilet, shower) with DME PRN and Mod I.  Long Term Goal 9: Pt will perform therex/therax to facilitate an increase in strength/endurance (with emphasis on dynamic standing balance/tolerance > 8 mins) with Mod I.  Long Term Goal 10: Pt will perform meal prep with Mod I. :                                       Eating: Eating  Assistance Needed: Independent  Comment: Pt was able to open packages and containers to eat breakfast IND. CARE Score: 6  Discharge Goal: Independent       Oral Hygiene: Oral Hygiene  Assistance Needed: Setup or clean-up assistance  Comment: Setup assist seated sinkside.   CARE Score: 5  Discharge Goal: Independent    UB/LB Bathing: Shower/Bathe Self  Assistance Needed: Partial/moderate assistance  Comment: Pt completed full shower, seated for entirety, weight shifting to wash rear. Pt requires assist washing distal LLE. CARE Score: 3  Discharge Goal: Independent    UB Dressing: Upper Body Dressing  Assistance Needed: Supervision or touching assistance  Comment: Sup to don pullover shirt. CARE Score: 4  Discharge Goal: Independent         LB Dressing: Lower Body Dressing  Assistance Needed: Supervision or touching assistance  Comment: Pt able to thread BLEs into pajama pants without AE to manage over hips in stance c SBA. CARE Score: 4  Discharge Goal: Independent    Donning and [place] Footwear: Putting On/Taking Off Footwear  Assistance Needed: Supervision or touching assistance  Comment: Sup to don B socks at bed level as well as R shoe. CARE Score: 4  Discharge Goal: Independent      Toiletin [address] needed: Supervision or touching assistance  Comment: CGA for clothing management to perform episode of urination in stance at toilet. CARE Score: 4  Discharge Goal: Independent      Toilet Transfers: Toilet Transfer  Assistance needed: Supervision or touching assistance  Comment: CGA with use of 2ww and Max verbal cues for safety as Pt impulsive with urgency.   CARE Score: 4  Discharge Goal: Independent    Physical Therapy:   Short Term Goals  Time Frame for Short Term Goals: 7-10 days  Short Term Goal 1: pt to complete all bed mobility mod I  Short Term Goal 2: Pt to complete sit to stand to/from bed, commode, and car transfers with  mod I  Short Term Goal 3: Pt will ambulate 50' on level surfaces and 10' on unlevel surfaces with 2ww and NWB LLE with mod I, supervision for 150' on level surface  Short Term Goal 4: Pt will ascend/descend curb step with 2ww and 4 steps with rail with CGA  Short Term Goal 5: Pt will  object with reacher and 2ww with mod I  Additional Goals?: Yes  Short Term Goal 6: pt will propel mod I in w/c for >500'            Bed Mobility:   Sit to Lying  Assistance Needed: Supervision or touching assistance  Comment: supervision with difficulty bringing LLE into bed  CARE Score: 4  Discharge Goal: Independent  Roll Left and Right  Assistance Needed: Independent  Comment: no bed features, uses RLE to assist LLE  CARE Score: 6  Discharge Goal: Independent  Lying to Sitting on Side of Bed  Assistance Needed: Supervision or touching assistance  Comment: handed pt leg , cues for use  CARE Score: 4  Discharge Goal: Independent    Transfers:    Sit to Stand  Assistance Needed: Supervision or touching assistance  Comment: SBA using 2WW following NWB on LLE  CARE Score: 4  Discharge Goal: Independent  Chair/Bed-to-Chair Transfer  Assistance Needed: Supervision or touching assistance  Comment: CGA using 2WW following NWB LLE  CARE Score: 4  Discharge Goal: Independent     Car Transfer  Assistance Needed: Supervision or touching assistance  Comment: CGA using 2WW following NWB LLE  Reason if not Attempted: Not attempted due to medical condition or safety concerns  CARE Score: 4  Discharge Goal: Independent    Ambulation:    Walking Ability  Does the Patient Walk?: Yes     Walk 10 Feet  Assistance Needed: Supervision or touching assistance  Comment: SBA using 2WW following NWB on LLE; tends to be impulsive requiring VCs to slow down pace for safety  CARE Score: 4  Discharge Goal: Independent     Walk 50 Feet with Two Turns  Assistance Needed: Supervision or touching assistance  Comment: CGA using 2WW following NWB on LLE  CARE Score: 4  Discharge Goal: Independent     Walk 150 Feet  Comment: max tolerance was 65 ft (limited by fatigue)  Reason if not Attempted: Not attempted due to medical condition or safety concerns  CARE Score: 88  Discharge Goal: Supervision or touching assistance     Walking 10 Feet on Uneven Surfaces  Assistance Needed: Supervision or touching assistance  Comment: 4 (deemed usual performance per team huddle)  Reason if not Attempted: Not attempted due to medical condition or safety concerns  CARE Score: 4  Discharge Goal: Independent     1 Step (Curb)  Comment: 3 (deemed usual performance per team huddle)  Reason if not Attempted: Not attempted due to medical condition or safety concerns  CARE Score: 88  Discharge Goal: Supervision or touching assistance     4 Steps  Comment: feel pt may be able to attempt next session  Reason if not Attempted: Not attempted due to medical condition or safety concerns  CARE Score: 88  Discharge Goal: Supervision or touching assistance     12 Steps  Reason if not Attempted: Not attempted due to medical condition or safety concerns  CARE Score: 88  Discharge Goal: Not Attempted       Wheelchair:  w/c Ability: Wheelchair Ability  Uses a Wheelchair and/or Scooter?: Yes  Wheel 50 Feet with Two Turns  Assistance Needed: Independent  Comment: Mod Ind using BUE  CARE Score: 6  Discharge Goal: Independent  Wheel 150 Feet  Assistance Needed: Independent  Comment: Mod Ind using BUE  CARE Score: 6  Discharge Goal: Independent          Balance:        Object: Picking Up Object  Assistance Needed: Supervision or touching assistance  Comment: supervision with 2ww and reacher  CARE Score: 4  Discharge Goal: Independent    I      Exam:    Blood pressure 123/68, pulse 94, temperature 98.2 °F (36.8 °C), temperature source Oral, resp. rate 18, height 5' 5\" (1.651 m), weight 240 lb 4.8 oz (109 kg), SpO2 100 %. General: Up in bed again. Left leg elevated. Alert and oriented. HEENT: Gazing right and left. Clear speech. No JVD.  MMM. Pulmonary: No wheezes, rales or rhonchi. Cardiac: Regular rate and rhythm. Abdomen: Patient's abdomen is soft and nondistended. Bowel sounds were present throughout. There was no rebound, guarding or masses noted. Upper extremities: No new swelling or bruising.     Lower extremities: Persistent edema of the left lower limb. The incision is well approximated and dry. No significant erythema. Sitting balance was good. Standing balance was poor. Lab Results   Component Value Date    WBC 6.7 11/27/2022    HGB 12.7 (L) 11/27/2022    HCT 36.9 (L) 11/27/2022    MCV 96.3 11/27/2022     11/27/2022     Lab Results   Component Value Date    INR 1.15 11/27/2022    PROTIME 14.9 (H) 11/27/2022     Lab Results   Component Value Date    CREATININE 0.6 (L) 11/27/2022    BUN 13 11/27/2022     (L) 11/27/2022    K 3.6 11/27/2022    CL 97 (L) 11/27/2022    CO2 27 11/27/2022     No results found for: ALT, AST, GGT, ALKPHOS, BILITOT    Expected length of stay  prior to a supervised level of function for discharge home with a walker and [address] OT/PT is 12/7/2022. Recommendations:    Left lateral tibial plateau fracture with gait disturbance: He is demonstrating benefit from participating in the daily occupational and physical therapy. Working on techniques for self-care and mobility maintaining no weightbearing through the left lower limb. Immobilizer on the left knee at all times. His incision shows no signs of infection. Less erythema today and a negative venous Doppler study yesterday. Ongoing aggressive pulmonary hygiene measures, nutritional support, pain management and DVT prophylaxis. Outpatient follow-up with orthopedics in 2 weeks. His left lower limb remains swollen but it seems like what I would expect at this point. Verbal cues and CGA-minimum physical assistance for transfers. .  DVT prophylaxis: Lovenox 30 mg SQ twice daily. His surgeon has committed him to a 6-week course. Planning on instruction for self administration of this medication. Monitoring his hemoglobin and platelet count periodically while on this medication. Periodically monitoring his thrombocytopenia. No clinical signs of blood loss. Uncontrolled pain: Off of injectable analgesics. Reasonable pain control.   He has oxycodone available as well. Cryotherapy. Initially we have been avoiding acetaminophen products while struggling with cirrhosis. He reports occasional use of Tylenol as an outpatient at the direction of his gastroenterologist.  Sometimes uses ibuprofen as well. Alcoholic liver disease: Chronulac 10 g twice daily. Cautious use of Tylenol products. Cautious diuresis. Neuropathy: Holding off with gabapentin. Weightbearing through his right lower limb is much as possible. This is a late entry note for service provided 12/2/2022.

## 2022-12-03 NOTE — FLOWSHEET NOTE
[x] daily progress note       [] discharge       Patient Name:  Odalys Arnold   :  1970 MRN: [id number]  Room:  [address] [place] [address] Date of Admission: 2022  Rehabilitation Diagnosis:   Closed fracture of lateral portion of left tibial plateau, initial encounter [S82.122A]       Date 12/3/2022       Day of ARU Week:  5   Time IN/-1040   Individual Tx Minutes 60   TOTAL Tx Time Mins 60   Restrictions Restrictions/Precautions  Restrictions/Precautions: General Precautions, Weight Bearing, Fall Risk (LLE NWB, order in for immobilzer, but no order for wearing one)      Communication with other providers: [x]   OK to see per nursing:     []   Spoke with team member regarding:      Subjective observations and cognitive status: Pt was sitting up in wheelchair in gym at beginning of treatment session. Pt was alert and agreeable to treatment session. Pain level/location:   Location: Some soreness in his leg (no pain rating given)   Discharge recommendations  Anticipated discharge date:  2022  Destination: []home alone   []home alone with assist PRN     [x] home w/ family      [] Continuous supervision  []SNF    [] Assisted living     [] Other:  Continued therapy: [x]HHC PT  []OUTPATIENT PT   [] No Further PT  []SNF PT  Caregiver training recommended: [x]Yes  [] No   Equipment needs: 2WW, manual w/c with elevating leg rests and anti-tippers  Odalys Arnold requires the assistance of a front-wheeled walker to successfully ambulate from room to room at home to allow completion of daily living tasks such as: bathing, toileting, dressing and grooming. A wheeled walker is necessary due to the patient's unsteady gait, upper body weakness, inability to  a standard walker. This patient can ambulate only by pushing a walker instead of using a lesser assistive device such as a cane or crutch.    Odalys Arnold requires a standard wheelchair to successfully complete daily living tasks such as: bathing, toileting, dressing and grooming; or any other daily living task in the home. A standard manual wheelchair is necessary due to patient's impaired ambulation and mobility restrictions and would be unable to resolve these daily living tasks using a cane or walker. The patient is capable of using/self-propelling a standard wheelchair safely in their home and can maneuver within their home with adequate access in a reasonable amount of time. The patient has not expressed an unwillingness to use the wheelchair. Having this wheelchair would enable Karen Alan ability to regularly participate in the above mentioned daily living tasks around the home. There is a caregiver available to provide necessary assistance. Expected length of need is at least 6 mos. Karen Alan does not have any infectious diagnoses. Karen Alan can self propel a wheelchair and needs anti-rollback device because of ramps. Karen Alan requires elevated leg rests due to a musculoskeletal condition which prevents 90 degree flexion in the knee and due to significant edema of the lower extremeties that requires an elevating leg rest.           Transfers:    Sit--> Stand:  SBA  Stand --> Sit:   SBA  Assistive device required for transfer:   RW    Gait:    Distance:  143'   Assistance:  CGA  Device:  RW  Gait Quality:  NWB hot to gait pattern. Pt required cuing for pacing to improve stability. This was max end requiring verbal cuing for pt to take a seat d/t noted instability at end range w/ pt wanting to continue but educating pt on importance of rest breaks to maintain safety.     Uneven Surfaces:       Assistance:    CGA on carpet  Device:    RW  Surfaces Completed:   [x]  Carpet mat with bean bags beneath      []  Throw rugs       []  Ramp       []  Outdoor pavements        []  Grass             []  Loose gravel        []  Other:         Additional Therapeutic activities/exercises completed this date:     []   Nu-step:  Time: Level:         #Steps:       []   Rebounder:    []  Seated     []  Standing        []   Balance training         []   Postural training    []   Supine ther ex (reps/sets):     [x]   Seated ther ex (reps/sets): Ankle pumps, heel raises, toe raises, arch doming and  PFC stretching to R foot 10 sec x 5 for improved strengthening/ flexibility to reduce cramping in R foot. []   Standing ther ex (reps/sets):     [x]   Picking up object from floor (standing): Pt required SBA  [x]   Reacher used. Pt required mod cuing through out session for proper weight shifting and to keep walker close during activity to maintain balance and stability. Pt required min cuing for deep breathing techniques to improve O2 intake and reduce valsalva maneuver during activity for safety. [x]  Other: 2\" curb step x 2 trials; Trial 1: pt required min A x 1 and CGA x2 d/t impulsivity and safety requiring cues for sequencing, control and pacing of activity. Trial 2: pt requried CGA x 2    []   Other:    Comments:  Pt able to maintain NWB on LLE through out session. Patient/Caregiver Education and Training:   [x]   Bed Mobility/Transfer technique/safety  [x]   Gait technique/sequencing  [x]   Proper use of assistive device  [x]   Advanced mobility safety and technique  [x]   Reinforced patient's precautions/mobility while maintaining precautions  []   Postural awareness  []   Family training  []   Progress was updated and reviewed in Rehabtracker with patient and/or family this date. Treatment Plan for Next Session: gait, advanced gait (curb step training,uneven surfaces), balance, LE exercises      Assessment: This pt demonstrated a positive response to today's treatment as evidenced by improved ability to navigate curb step. The patient is making good progress toward established goals as evidenced by QI scores.  Ongoing deficits are observed in the areas of endurance, strengthening and balance and continued focus on these is recommended. Treatment/Activity Tolerance:   [x] Tolerated treatment with no adverse effects    [] Patient limited by fatigue  [] Patient limited by pain   [] Patient limited by medical complications:    [] Adverse reaction to Tx:   [] Significant change in status    Safety:       []  bed alarm set    [x]  chair alarm set    []  Pt refused alarms                []  Telesitter activated      [x]  Gait belt used during tx session      [x]Other: Pt left seated in wheelchair w/ call light at end of treatment session.          Number of Minutes/Billable Intervention  Gait Training 15   Therapeutic Exercise 10   Neuro Re-Ed    Therapeutic Activity 35   Wheelchair Propulsion    Group    Other:    TOTAL 60         Social History  Social/Functional History  Lives With: Alone  Type of Home: House  Home Layout: One level  Home Access: Stairs to enter without rails  Entrance Stairs - Number of Steps: 1 (from front)would have to walk through grass, 2 steps through garage  Entrance Stairs - Rails: None  Bathroom Shower/Tub: Tub/Shower unit  Bathroom Toilet: Standard  Bathroom Equipment: Shower chair, Grab bars in shower  Bathroom Accessibility:  (pt unsure if bathroom big enough for walker)  Home Equipment: Heromoses Salas  Has the patient had two or more falls in the past year or any fall with injury in the past year?: Yes (Pt has fallen 2-3 times in past year, this fall only one with injury)  Receives Help From: Family (pt needed assist with cleaning and anything requiring prolonged standing or activity tolerance due to B foot pain and decreased endurance)  ADL Assistance: [facility] [address]: Needs assistance  Homemaking Responsibilities: Yes  Meal Prep Responsibility: Primary  Laundry Responsibility: Secondary (family primarily does)  Cleaning Responsibility: Secondary (pt only performs light cleaning occasionally, family performs the rest)  Bill Paying/Finance Responsibility: Primary  Shopping Responsibility: Primary  Health Care Management: Primary  Ambulation Assistance: Independent (no device in house or to go to mailbox (400' round trip))  Transfer Assistance: Independent  Active : Yes  Mode of Transportation: Truck  Occupation: On disability  Type of Occupation: Pt on disability due to cirrhosis  Additional Comments: pt admits he only cleans up himself or house when he absolutely has to at baseline. sleeps in regular flat bed, but cannot sleep well on back    Objective                                                                                    Goals:  (Update in navigator)  Short Term Goals  Time Frame for Short Term Goals: 7-10 days  Short Term Goal 1: pt to complete all bed mobility mod I  Short Term Goal 2: Pt to complete sit to stand to/from bed, commode, and car transfers with  mod I  Short Term Goal 3: Pt will ambulate 50' on level surfaces and 10' on unlevel surfaces with 2ww and NWB LLE with mod I, supervision for 150' on level surface  Short Term Goal 4: Pt will ascend/descend curb step with 2ww and 4 steps with rail with CGA  Short Term Goal 5: Pt will  object with reacher and 2ww with mod I  Additional Goals?: Yes  Short Term Goal 6: pt will propel mod I in w/c for >500':   :        Plan of Care                                                                              Times per week: 5 days per week for a minimum of 60 minutes/day plus group as appropriate for 60 minutes.   Treatment to include Current Treatment Recommendations: Strengthening, Balance training, Functional mobility training, Transfer training, Endurance training, IADL training, Gait training, Stair training, Neuromuscular re-education, Return to work related activity, Pain management, Patient/Caregiver education & training, Equipment evaluation, education, & procurement, Therapeutic activities, Positioning, Safety education & training    Electronically signed by   Jayde Pal PTA, [id number] 12/3/2022, 8:19 AM

## 2022-12-03 NOTE — PROGRESS NOTES
Physical Rehabilitation: OCCUPATIONAL THERAPY     [x] daily progress note       [] discharge       Patient Name:  Kady Gregg   :  1970 MRN: [id number]  Room:  [address] [place] [address] Date of Admission: 2022  Rehabilitation Diagnosis:   Closed fracture of lateral portion of left tibial plateau, initial encounter [S82.122A]       Date 12/3/2022       Day of ARU Week:  5   Time IN/-930/0973-1069   Individual Tx Minutes 60+70   Group Tx Minutes    Co-Treat Minutes    Concurrent Tx Minutes    TOTAL Tx Time Mins 130   Variance Time +10   Variance Time []   Refusal due to:     []   Medical hold/reason:    []   Illness   []   Off Unit for test/procedure  []   Extra time needed to complete task  []   Therapeutic need  [x]   Other (specify):  approached just prior to transferring to shower to provide communion to pt   Restrictions Restrictions/Precautions: General Precautions, Weight Bearing, Fall Risk (LLE NWB, order in for immobilzer, but no order for wearing one)         Communication with other providers: [x]   OK to see per nursing:     []   Spoke with team member regarding:      Subjective observations and cognitive status: 1st Session: Pt in semi-atkins position upon arrival c nursing present and pt meal tray present. Pt agreeable to sit EOB to complete meal. Pt agreeable to shower during 2nd session. Pt reports having  bathroom measurements by Monday at the latest.     2nd Session: Upon second approach pt seated in w/c in room. Pleasant and agreeable to OT tx and shower this session.       Pain level/location:    3/10       Location: L LLE below knee   Discharge recommendations  Anticipated discharge date:    Destination: []home alone   [x]home alone w assist prn   [] home w/ family    [] Continuous supervision       []SNF    [] Assisted living     [] Other:   Continued therapy: [x]HHC OT  []OUTPATIENT  OT   [] No Further OT  Equipment needs: Kady Gregg requires the assistance of a tub transfer bench to successfully and safely complete bathing activities necessary due to reduced balance, endurance, need for ADL assist, and LE weakness and reduced ROM. These tasks cannot be safely completed without this device and would place Liz Mosquera at greater risk of falls. Toileting:   denies need        Bed Mobility:           []   Pt received out of bed   Scooting:  IND  Supine --> Sit:  IND  Sit --> Supine:  IND    Transfers:    Sit--> Stand:  SBA  Stand --> Sit:   SBA  Stand-Pivot:   SBA  Other:  Bench in shower CGA  Assistive device required for transfer:   FWW      Functional Mobility:    Assistance:  1st Session: Pt completed fxl w/c mob throughout room and  room>therapy gym @ Sup demoing good safety awareness through doorways, in tight spaces, and maneuvering around obstacles. 2nd Session: Pt completed fxl mob within room and bathroom c FWW @ CG-SBA demoing F+ balance and safety during mob. Device:   []   Rolling Walker     []   Standard Sherl Owen [x]   Wheelchair        []   [employer]       []   4-Wheeled Sherl Owen         []   Cardiac Walker       []   Other:        Additional Therapeutic activities/exercises completed this date:     [x]   ADL Traininst Session: Pt sat EOB x 20 mins completing self feeding demoing good seated balance and tolerance. Pt completed UBdressing seated EOB @ VYAS. Pt completed LB dressing seated EOB utilizing reacher to assist threading LE's into pants @ Sup while in stance to fully hike over hips demoing 1 LOB noted pt able to recover @ SBA. Pt donning footwear utilizing sock aide to don socks and donning shoe in figure 4 position. 2nd Session: Pt completed UB/LB showering this date majority seated in shower on shower bench c Min A to wash lower LE. Pt completed doffing UB clothing seated in shower @ VYAS. Pt completed doffing LB clothing @ CGA c VC's for proper techn and safety as pt trialed completely unthreading LE while in stance.  Pt completed FWW>shower bench SPT c use of grab bars @ CGA. Pt completed shower bench>w/c @ CGA. Pt donned brief while seated in in w/c with use of reacher and CGA c FWW to fully hike over hips. []   Balance/Postural training     []   Bed/Transfer Training   [x]   Endurance Training : Pt completed reciprocal pattern UE movement while seated in w/c against mod resistance maintaining 60 RPMs x 20 minsreq 0 Rbs  demoing good endurace. []   Neuromuscular Re-ed   []   Nu-step:  Time:        Level:         #Steps:       []   Rebounder:    []  Seated     []  Standing        []   Supine Ther Ex (reps/sets):     []   Seated Ther Ex (reps/sets):     []   Standing Ther Ex (reps/sets):     [x]   Other: Pt engaged in leisure ax of receiving communion while in room seated. Comments: All interventions completed to increase strength, endurance, ax tolerance, balance and safety to further IND c (I)ADLs and fxl mob/transfers to improve QOL and prepare for d/c home and community. Patient/Caregiver Education and Training:   [x]   Adaptive Equipment Use  [x]   Bed Mobility/Transfer Technique/Safety  [x]   Energy Conservation Tips  []   Family training  [x]   Postural Awareness  [x]   Safety During Functional Activities  []   Reinforced Patient's Precautions   []   Progress was updated and reviewed in Rehabtracker with patient and/or family this         date. Treatment Plan for Next Session: Cont OT POC as tolerated      Assessment: This pt demonstrated a positive response to today's treatment as evidenced by good participation and safety. The patient is making good progress toward established goals as evidenced by QI scores. Ongoing deficits are observed in the areas of functional balance, strength, FMC, sensation, pain, and ADLs and continued focus on this is recommended.             Treatment/Activity Tolerance:   [x] Tolerated treatment with no adverse effects    [] Patient limited by fatigue  [] Patient limited by pain   [] Patient limited by medical complications:    [] Adverse reaction to Tx:   [] Significant change in status    Safety:       []  bed alarm set    []  chair alarm set    []  Pt refused alarms                []  Telesitter activated      [x]  Gait belt used during tx session      [x]other:   Left c PTA following first session    Number of Minutes/Billable Intervention  Therapeutic Exercise 20   ADL Self-care 30+45   Neuro Re-Ed    Therapeutic Activity 10+25   Group    Other:    TOTAL 130       Social History  Social/Functional History  Lives With: Alone  Type of Home: House  Home Layout: One level  Home Access: Stairs to enter without rails  Entrance Stairs - Number of Steps: 1 (from front)would have to walk through grass, 2 steps through garage  Entrance Stairs - Rails: None  Bathroom Shower/Tub: Tub/Shower unit  Bathroom Toilet: Standard  Bathroom Equipment: Shower chair, Grab bars in shower  Bathroom Accessibility:  (pt unsure if bathroom big enough for walker)  Home Equipment: Flavio Hammers, Crutches  Has the patient had two or more falls in the past year or any fall with injury in the past year?: Yes (Pt has fallen 2-3 times in past year, this fall only one with injury)  Receives Help From: Family (pt needed assist with cleaning and anything requiring prolonged standing or activity tolerance due to B foot pain and decreased endurance)  ADL Assistance: Independent  Homemaking Assistance: Needs assistance  Homemaking Responsibilities: Yes  Meal Prep Responsibility: Primary  Laundry Responsibility: Secondary (family primarily does)  Cleaning Responsibility: Secondary (pt only performs light cleaning occasionally, family performs the rest)  Bill Paying/Finance Responsibility: Primary  Shopping Responsibility: Primary  Health Care Management: Primary  Ambulation Assistance: Independent (no device in house or to go to [employer] (400' round trip))  Transfer Assistance: Independent  Active : Yes  Mode of Transportation: Truck  Occupation:  On disability  Type of Occupation: Pt on disability due to cirrhosis  Additional Comments: pt admits he only cleans up himself or house when he absolutely has to at baseline. sleeps in regular flat bed, but cannot sleep well on back    Objective                                                                                    Goals:  (Update in navigator)  Short Term Goals  Time Frame for Short Term Goals: STGs=LTGs:  Long Term Goals  Time Frame for Long Term Goals : 7-10 days or until d/c. Long Term Goal 1: Pt will complete self feeding with Mod I.  Long Term Goal 2: Pt will complete oral hygiene and grooming tasks with Mod I.  Long Term Goal 3: Pt will complete total body bathing with AE PRN and Mod I.  Long Term Goal 4: Pt will complete UB dressing with IND. Long Term Goal 5: Pt will complete LB dressing with AE PRN and Mod I. Additional Goals?: Yes  Long Term Goal 6: Pt will doff/don footwear with AE PRN and Mod I.  Long Term Goal 7: Pt will perform toileting with Mod I.  Long Term Goal 8: Pt will perform functional transfers (bed, chair, toilet, shower) with DME PRN and Mod I.  Long Term Goal 9: Pt will perform therex/therax to facilitate an increase in strength/endurance (with emphasis on dynamic standing balance/tolerance > 8 mins) with Mod I.  Long Term Goal 10: Pt will perform meal prep with Mod I.:        Plan of Care                                                                              Times per week: 5 days per week for a minimum of 60 minutes/day plus group as appropriate for 60 minutes.   Treatment to include Occupational Therapy Plan  Current Treatment Recommendations: Strengthening, ROM, Balance training, Functional mobility training, Endurance training, Pain management, Safety education & training, Patient/Caregiver education & training, Equipment evaluation, education, & procurement, Positioning, Self-Care / ADL, Home management training, Coordination training    Electronically signed by BOBY Mooney,  12/3/2022, 8:25 AM

## 2022-12-04 VITALS
HEIGHT: 65 IN | RESPIRATION RATE: 19 BRPM | WEIGHT: 246.91 LBS | HEART RATE: 89 BPM | TEMPERATURE: 98.8 F | OXYGEN SATURATION: 96 % | DIASTOLIC BLOOD PRESSURE: 55 MMHG | BODY MASS INDEX: 41.14 KG/M2 | SYSTOLIC BLOOD PRESSURE: 121 MMHG

## 2022-12-04 LAB
GLUCOSE BLD-MCNC: 111 MG/DL (ref 70–99)
GLUCOSE BLD-MCNC: 118 MG/DL (ref 70–99)
GLUCOSE BLD-MCNC: 133 MG/DL (ref 70–99)

## 2022-12-04 PROCEDURE — 1280000000 HC REHAB R&B

## 2022-12-04 PROCEDURE — 82962 GLUCOSE BLOOD TEST: CPT

## 2022-12-04 PROCEDURE — 94761 N-INVAS EAR/PLS OXIMETRY MLT: CPT

## 2022-12-04 PROCEDURE — 6360000002 HC RX W HCPCS: Performed by: STUDENT IN AN ORGANIZED HEALTH CARE EDUCATION/TRAINING PROGRAM

## 2022-12-04 PROCEDURE — 94150 VITAL CAPACITY TEST: CPT

## 2022-12-04 PROCEDURE — 6370000000 HC RX 637 (ALT 250 FOR IP): Performed by: STUDENT IN AN ORGANIZED HEALTH CARE EDUCATION/TRAINING PROGRAM

## 2022-12-04 PROCEDURE — 6370000000 HC RX 637 (ALT 250 FOR IP): Performed by: PHYSICAL MEDICINE & REHABILITATION

## 2022-12-04 PROCEDURE — 2580000003 HC RX 258: Performed by: STUDENT IN AN ORGANIZED HEALTH CARE EDUCATION/TRAINING PROGRAM

## 2022-12-04 RX ADMIN — OXYCODONE HYDROCHLORIDE 5 MG: 5 TABLET ORAL at 10:20

## 2022-12-04 RX ADMIN — IBUPROFEN 400 MG: 400 TABLET ORAL at 10:20

## 2022-12-04 RX ADMIN — ENOXAPARIN SODIUM 30 MG: 100 INJECTION SUBCUTANEOUS at 10:09

## 2022-12-04 RX ADMIN — SPIRONOLACTONE 150 MG: 50 TABLET ORAL at 12:05

## 2022-12-04 RX ADMIN — IBUPROFEN 400 MG: 400 TABLET ORAL at 21:22

## 2022-12-04 RX ADMIN — LACTULOSE 30 G: 10 SOLUTION ORAL at 10:09

## 2022-12-04 RX ADMIN — FUROSEMIDE 60 MG: 40 TABLET ORAL at 10:09

## 2022-12-04 RX ADMIN — ENOXAPARIN SODIUM 30 MG: 100 INJECTION SUBCUTANEOUS at 21:11

## 2022-12-04 RX ADMIN — SODIUM CHLORIDE, PRESERVATIVE FREE 10 ML: 5 INJECTION INTRAVENOUS at 21:11

## 2022-12-04 RX ADMIN — SODIUM CHLORIDE, PRESERVATIVE FREE 10 ML: 5 INJECTION INTRAVENOUS at 10:10

## 2022-12-04 RX ADMIN — Medication 1 TABLET: at 10:09

## 2022-12-04 RX ADMIN — LACTULOSE 30 G: 10 SOLUTION ORAL at 21:11

## 2022-12-04 ASSESSMENT — PAIN SCALES - GENERAL
PAINLEVEL_OUTOF10: 2
PAINLEVEL_OUTOF10: 2
PAINLEVEL_OUTOF10: 4
PAINLEVEL_OUTOF10: 0
PAINLEVEL_OUTOF10: 5
PAINLEVEL_OUTOF10: 0

## 2022-12-04 ASSESSMENT — PAIN DESCRIPTION - ORIENTATION
ORIENTATION: LEFT
ORIENTATION: LEFT

## 2022-12-04 ASSESSMENT — PAIN SCALES - WONG BAKER
WONGBAKER_NUMERICALRESPONSE: 0
WONGBAKER_NUMERICALRESPONSE: 0

## 2022-12-04 ASSESSMENT — PAIN DESCRIPTION - LOCATION
LOCATION: LEG
LOCATION: LEG

## 2022-12-04 ASSESSMENT — PAIN DESCRIPTION - DESCRIPTORS: DESCRIPTORS: ACHING;DISCOMFORT

## 2022-12-04 ASSESSMENT — PAIN - FUNCTIONAL ASSESSMENT: PAIN_FUNCTIONAL_ASSESSMENT: PREVENTS OR INTERFERES SOME ACTIVE ACTIVITIES AND ADLS

## 2022-12-05 LAB
GLUCOSE BLD-MCNC: 111 MG/DL (ref 70–99)
GLUCOSE BLD-MCNC: 123 MG/DL (ref 70–99)
GLUCOSE BLD-MCNC: 98 MG/DL (ref 70–99)

## 2022-12-05 PROCEDURE — 97542 WHEELCHAIR MNGMENT TRAINING: CPT

## 2022-12-05 PROCEDURE — 97116 GAIT TRAINING THERAPY: CPT

## 2022-12-05 PROCEDURE — 97530 THERAPEUTIC ACTIVITIES: CPT

## 2022-12-05 PROCEDURE — 97110 THERAPEUTIC EXERCISES: CPT

## 2022-12-05 PROCEDURE — 94150 VITAL CAPACITY TEST: CPT

## 2022-12-05 PROCEDURE — 94761 N-INVAS EAR/PLS OXIMETRY MLT: CPT

## 2022-12-05 PROCEDURE — 97535 SELF CARE MNGMENT TRAINING: CPT

## 2022-12-05 PROCEDURE — 6360000002 HC RX W HCPCS: Performed by: STUDENT IN AN ORGANIZED HEALTH CARE EDUCATION/TRAINING PROGRAM

## 2022-12-05 PROCEDURE — 82962 GLUCOSE BLOOD TEST: CPT

## 2022-12-05 PROCEDURE — 6370000000 HC RX 637 (ALT 250 FOR IP): Performed by: STUDENT IN AN ORGANIZED HEALTH CARE EDUCATION/TRAINING PROGRAM

## 2022-12-05 PROCEDURE — 2580000003 HC RX 258: Performed by: STUDENT IN AN ORGANIZED HEALTH CARE EDUCATION/TRAINING PROGRAM

## 2022-12-05 PROCEDURE — 6370000000 HC RX 637 (ALT 250 FOR IP): Performed by: PHYSICAL MEDICINE & REHABILITATION

## 2022-12-05 PROCEDURE — 1280000000 HC REHAB R&B

## 2022-12-05 RX ORDER — OXYCODONE HYDROCHLORIDE 5 MG/1
5 TABLET ORAL EVERY 6 HOURS PRN
Status: DISCONTINUED | OUTPATIENT
Start: 2022-12-05 | End: 2022-12-07 | Stop reason: HOSPADM

## 2022-12-05 RX ADMIN — OXYCODONE HYDROCHLORIDE 5 MG: 5 TABLET ORAL at 14:15

## 2022-12-05 RX ADMIN — OXYCODONE HYDROCHLORIDE 5 MG: 5 TABLET ORAL at 06:17

## 2022-12-05 RX ADMIN — ENOXAPARIN SODIUM 30 MG: 100 INJECTION SUBCUTANEOUS at 21:11

## 2022-12-05 RX ADMIN — IBUPROFEN 400 MG: 400 TABLET ORAL at 14:15

## 2022-12-05 RX ADMIN — LACTULOSE 30 G: 10 SOLUTION ORAL at 21:11

## 2022-12-05 RX ADMIN — SPIRONOLACTONE 150 MG: 50 TABLET ORAL at 12:28

## 2022-12-05 RX ADMIN — SODIUM CHLORIDE, PRESERVATIVE FREE 10 ML: 5 INJECTION INTRAVENOUS at 08:06

## 2022-12-05 RX ADMIN — IBUPROFEN 400 MG: 400 TABLET ORAL at 06:18

## 2022-12-05 RX ADMIN — FUROSEMIDE 60 MG: 40 TABLET ORAL at 08:02

## 2022-12-05 RX ADMIN — ENOXAPARIN SODIUM 30 MG: 100 INJECTION SUBCUTANEOUS at 08:01

## 2022-12-05 RX ADMIN — BISACODYL 10 MG: 5 TABLET, COATED ORAL at 06:18

## 2022-12-05 RX ADMIN — LACTULOSE 30 G: 10 SOLUTION ORAL at 08:02

## 2022-12-05 RX ADMIN — POLYETHYLENE GLYCOL (3350) 17 G: 17 POWDER, FOR SOLUTION ORAL at 06:17

## 2022-12-05 RX ADMIN — Medication 1 TABLET: at 08:02

## 2022-12-05 ASSESSMENT — PAIN SCALES - GENERAL: PAINLEVEL_OUTOF10: 5

## 2022-12-05 ASSESSMENT — PAIN DESCRIPTION - DESCRIPTORS: DESCRIPTORS: ACHING;DISCOMFORT;TEARING

## 2022-12-05 ASSESSMENT — PAIN - FUNCTIONAL ASSESSMENT: PAIN_FUNCTIONAL_ASSESSMENT: PREVENTS OR INTERFERES SOME ACTIVE ACTIVITIES AND ADLS

## 2022-12-05 ASSESSMENT — PAIN DESCRIPTION - ORIENTATION: ORIENTATION: LEFT

## 2022-12-05 ASSESSMENT — PAIN DESCRIPTION - LOCATION: LOCATION: LEG

## 2022-12-05 NOTE — PROGRESS NOTES
Comprehensive Nutrition Assessment    Type and Reason for Visit:  Reassess    Nutrition Recommendations/Plan:   Continue no added salt diet   Will continue to follow up during stay      Malnutrition Assessment:  Malnutrition Status:  Insufficient data (11/30/22 1408)    Context:  Acute Illness       Nutrition Assessment:    Remains on no added salt diet, intake at most meals %. Patient content with meals. Drinks kefir most days. Reasonable intake during stay. Will follow at low nutrition risk at this time. Nutrition Related Findings:    resting in bed, preparing for lunch,  POCT under 130 Wound Type: Surgical Incision       Current Nutrition Intake & Therapies:    Average Meal Intake: %  Average Supplements Intake: None Ordered  ADULT DIET; Regular; No Added Salt (3-4 gm)    Anthropometric Measures:  Height: 5' 5\" (165.1 cm)  Ideal Body Weight (IBW): 136 lbs (62 kg)    Admission Body Weight: 239 lb 3.2 oz (108.5 kg)  Current Body Weight: 245 lb 9.5 oz (111.4 kg), 175.9 % IBW.  Weight Source: Bed Scale  Current BMI (kg/m2): 40.9  Usual Body Weight: 186 lb (84.4 kg) ( MD office in January)  % Weight Change (Calculated): 28.6  Weight Adjustment For: No Adjustment                 BMI Categories: Obese Class 3 (BMI 40.0 or greater)    Estimated Daily Nutrient Needs:  Energy Requirements Based On: Formula  Weight Used for Energy Requirements: Current  Energy (kcal/day): 2122-9160  Weight Used for Protein Requirements: Ideal  Protein (g/day): 74-86 (1.2-1.4 g/kg)  Method Used for Fluid Requirements: 1 ml/kcal  Fluid (ml/day): 2100    Nutrition Diagnosis:   Increased nutrient needs related to acute injury/trauma as evidenced by other (comment) (healing tibia fracture s/p ORIF)    Nutrition Interventions:   Food and/or Nutrient Delivery: Continue Current Diet  Nutrition Education/Counseling: No recommendation at this time  Coordination of Nutrition Care: Continue to monitor while inpatient  Plan of Care discussed with: patient    Goals:  Previous Goal Met: Progressing toward Goal(s)  Goals: PO intake 75% or greater, by next RD assessment       Nutrition Monitoring and Evaluation:   Behavioral-Environmental Outcomes: None Identified  Food/Nutrient Intake Outcomes: Food and Nutrient Intake  Physical Signs/Symptoms Outcomes: Biochemical Data, Meal Time Behavior, Skin, Weight    Discharge Planning:    Continue current diet     Kya Godinez RD, LD  Contact: [phone]

## 2022-12-05 NOTE — CARE COORDINATION
Received a call from [facility]. They are unable to accept patient at this time. No skilled nursing services available in the [facility]. Referral faxed to [facility] for review.

## 2022-12-05 NOTE — CARE COORDINATION
Replaced by [facility] does service Cholo Harley and accepts [facility]. Referral faxed to office for review. [facility] [place] [facility]. Only other two options are [facility].

## 2022-12-05 NOTE — PROGRESS NOTES
Physical Therapy    [x] daily progress note       [] discharge       Patient Name:  Tyler Hernandez   :  1970 MRN: [id number]  Room:  [address] [place] [address] Date of Admission: 2022  Rehabilitation Diagnosis:   Closed fracture of lateral portion of left tibial plateau, initial encounter [S82.122A]       Date 2022       Day of ARU Week:  7   Time IN/OUT 1300/1410   Individual Tx Minutes 70   Group Tx Minutes    Co-Treat Minutes    Concurrent Tx Minutes    TOTAL Tx Time Mins 70   Variance Time +10   Variance Time []   Refusal due to:     []   Medical hold/reason:    []   Illness   []   Off Unit for test/procedure  [x]   Extra time needed to complete tasks  []   Therapeutic need  []   Other (specify):   Restrictions Restrictions/Precautions  Restrictions/Precautions: General Precautions, Weight Bearing, Fall Risk (LLE NWB, order in for immobilzer, but no order for wearing one)      Interdisciplinary communication [x]   Cleared for therapy per nursing     []   RN notified about issues during session  []   RN updated on pt performance  []   Spoke with   []   Spoke with OT  []   Spoke with MD  []   Other:    Subjective observations and cognitive status: Pt resting in high atkins's position, alert, states feeling tired, expressed concerns related to constipation despite getting multiple laxatives/stool softeners. Pt states ramp will be available a week from now.     Pain level/location: 4/10       Location: L knee down to ankle    Discharge recommendations  Anticipated discharge date:  2022  Destination: []home alone   []home alone with assist PRN     [x] home w/ family      [] Continuous supervision  []SNF    [] Assisted living     [] Other:  Continued therapy: [x]HHC PT  []OUTPATIENT PT   [] No Further PT  []SNF PT  Caregiver training recommended: [x]Yes  [] No   Equipment needs: 2WW, manual w/c with elevating leg rests and anti-tippers; ramp   Tyler Hernandez requires the assistance of a front-wheeled walker to successfully ambulate from room to room at home to allow completion of daily living tasks such as: bathing, toileting, dressing and grooming. A wheeled walker is necessary due to the patient's unsteady gait, upper body weakness, inability to  a standard walker. This patient can ambulate only by pushing a walker instead of using a lesser assistive device such as a cane or crutch. Mirza Manuel requires a standard wheelchair to successfully complete daily living tasks such as: bathing, toileting, dressing and grooming; or any other daily living task in the home. A standard manual wheelchair is necessary due to patient's impaired ambulation and mobility restrictions and would be unable to resolve these daily living tasks using a cane or walker. The patient is capable of using/self-propelling a standard wheelchair safely in their home and can maneuver within their home with adequate access in a reasonable amount of time. The patient has not expressed an unwillingness to use the wheelchair. Having this wheelchair would enable Mirza Manuel ability to regularly participate in the above mentioned daily living tasks around the home. There is a caregiver available to provide necessary assistance. Expected length of need is at least 6 mos. Mirza Manuel does not have any infectious diagnoses. Mirza Manuel can self propel a wheelchair and needs anti-rollback device because of ramps.      Mirza Manuel requires elevated leg rests due to a musculoskeletal condition which prevents 90 degree flexion in the knee and due to significant edema of the lower extremeties that requires an elevating leg rest.     PT IRF-JUDE scores and goals for discharge assessment:   Roll Left and Right  Assistance Needed: Independent  Comment: Ind without use of bed features; pt self-assisted LLE using RLE to roll to R; pt able to tolerate rolling to L side with some aggravation of LLE pain (5/10)  CARE Score: 6  Discharge Goal: Independent    Sit to Lying  Assistance Needed: Independent  Comment: Ind without use of bed features  CARE Score: 6  Discharge Goal: Independent    Lying to Sitting on Side of Bed  Assistance Needed: Independent  Comment: Ind without use of bed features towards L side of bed simulating home set-up  CARE Score: 6  Discharge Goal: Independent    Sit to Stand  Assistance Needed: Independent  Comment: Mod Ind with 2WW following NWB on LLE  CARE Score: 6  Discharge Goal: Independent    Chair/Bed-to-Chair Transfer  Assistance Needed: Supervision or touching assistance  Comment: SBA using squat pivot technique leading with RLE/LLE; pt able to position and manage brakes of w/c prior to transfers  CARE Score: 4  Discharge Goal: Independent    Car Transfer  Assistance Needed: Independent  Comment: Mod Ind using 2WW following NWB on LLE  CARE Score: 6  Discharge Goal: Independent    Walk 10 Feet? Walk 10 Feet?: Yes      Wheelchair Ability  Uses a Wheelchair and/or Scooter?: Yes    Wheel 50 Feet with Two Turns  Assistance Needed: Independent  Comment: Mod Ind using BUE  CARE Score: 6  Discharge Goal: Independent      Walking Ability  Does the Patient Walk?: Yes    Walk 10 Feet  Assistance Needed: Independent  Comment: Mod Ind using 2WW following NWB on LLE  CARE Score: 6  Discharge Goal: Independent    1 Step (Curb):  Total A at w/c level to ascend/descend 6\" step height      Additional Therapeutic activities/exercises completed this date:     []   Nu-step:  Time:        Level:         #Steps:       []   Rebounder:    []  Seated     []  Standing        []   Balance training         []   Postural training    [x]   Supine ther ex (reps/sets):  bilat ankle PF/DF x 15 reps, bilat. quad sets x 5 sec hold x 15 reps, L knee flexion (~45*) x 5 sec hold/controlled extension x 5 reps x 3 sets, modified pelvic bridging following NWB on LLE x 15 reps, L SLR x 5 reps x 3 sets    []   Seated ther ex (reps/sets):     []   Standing ther ex (reps/sets):     []   Other: Toileting activity completed with    []   Other:    Comments:      Patient/Caregiver Education and Training:   []   Role of PT  []   Education about Dx  []   Use of call light for assist   []   HEP provided and explained   [x]   Treatment plan reviewed  [x]   Home safety  []   Wheelchair mobility/management   []   Body mechanics  []   Bed Mobility/Transfer technique  []   Gait technique/sequencing  []   Proper use of assistive device/adaptive equipment  []   Stair training/Advanced mobility safety and technique  [x]   Reinforced patient's precautions/mobility while maintaining precautions  []   Postural awareness  []   Family/caregiver training  [x]   Progress was updated and reviewed with patient this date. [x]   Other: recommended technique to assist him at w/c level by his sister to manage entry step for his safety     Treatment Plan for Next Session: discharge QI tasks     Assessment: This pt demonstrated a positive response to today's treatment as evidenced by increased independence with transfers and steady gait quality over 10 ft level surface. The patient is making good progress toward established goals as evidenced by QI scores.       Treatment/Activity Tolerance:   [] Tolerated treatment with no adverse effects    [] Patient limited by fatigue  [x] Patient limited by pain   [] Patient limited by medical complications:    [] Adverse reaction to Tx:   [] Significant change in status    Safety:       [x]  bed alarm set    []  chair alarm set    []  Pt refused alarms                []  Telesitter activated      [x]  Gait belt used during tx session      []other:       Number of Minutes/Billable Intervention  Gait Training    Therapeutic Exercise 30   Neuro Re-Ed    Therapeutic Activity 30   Wheelchair Propulsion 10   Group    Other:    TOTAL 70     Social History  Social/Functional History  Lives With: Alone  Type of Home: House  Home Layout: One level  Home Access: Stairs to enter without rails  Entrance Stairs - Number of Steps: 1 (from front)would have to walk through grass, 2 steps through garage  Entrance Stairs - Rails: None  Bathroom Shower/Tub: Tub/Shower unit  Bathroom Toilet: Standard  Bathroom Equipment: Shower chair, Grab bars in shower  Bathroom Accessibility:  (pt unsure if bathroom big enough for walker)  Home Equipment: Nanetta An  Has the patient had two or more falls in the past year or any fall with injury in the past year?: Yes (Pt has fallen 2-3 times in past year, this fall only one with injury)  Receives Help From: Family (pt needed assist with cleaning and anything requiring prolonged standing or activity tolerance due to B foot pain and decreased endurance)  ADL Assistance: [address]: Needs assistance  Homemaking Responsibilities: Yes  Meal Prep Responsibility: Primary  Laundry Responsibility: Secondary (family primarily does)  Cleaning Responsibility: Secondary (pt only performs light cleaning occasionally, family performs the rest)  Bill Paying/Finance Responsibility: Primary  Shopping Responsibility: Primary  Health Care Management: Primary  Ambulation Assistance: Independent (no device in house or to go to mailbox (400' round trip))  Transfer Assistance: Independent  Active : Yes  Mode of Transportation: Truck  Occupation: On disability  Type of Occupation: Pt on disability due to cirrhosis  Additional Comments: pt admits he only cleans up himself or house when he absolutely has to at baseline.  sleeps in regular flat bed, but cannot sleep well on back    Objective                                                                                    Goals:  (Update in navigator)  Short Term Goals  Time Frame for Short Term Goals: 7-10 days  Short Term Goal 1: pt to complete all bed mobility mod I  Short Term Goal 2: Pt to complete sit to stand to/from bed, commode, and car transfers with  mod I  Short Term Goal 3: Pt will ambulate 48' on level surfaces and 10' on unlevel surfaces with 2ww and NWB LLE with mod I, supervision for 150' on level surface  Short Term Goal 4: Pt will ascend/descend curb step with 2ww and 4 steps with rail with CGA  Short Term Goal 5: Pt will  object with reacher and 2ww with mod I  Additional Goals?: Yes  Short Term Goal 6: pt will propel mod I in w/c for >500':   :        Plan of Care                                                                              Times per week: 5 days per week for a minimum of 60 minutes/day plus group as appropriate for 60 minutes.   Treatment to include Current Treatment Recommendations: Strengthening, Balance training, Functional mobility training, Transfer training, Endurance training, IADL training, Gait training, Stair training, Neuromuscular re-education, Return to work related activity, Pain management, Patient/Caregiver education & training, Equipment evaluation, education, & procurement, Therapeutic activities, Positioning, Safety education & training    Electronically signed by   Sae Cortes, PT  12/5/2022, 2:41 PM

## 2022-12-05 NOTE — FLOWSHEET NOTE
[x] daily progress note       [] discharge       Patient Name:  Semaj Marquez   :  1970 MRN: [id number]  Room:  [address] [place] [address] Date of Admission: 2022  Rehabilitation Diagnosis:   Closed fracture of lateral portion of left tibial plateau, initial encounter [S82.122A]       Date 2022       Day of ARU Week:  7   Time IN/OUT 4215-7373   Individual Tx Minutes 30   TOTAL Tx Time Mins 30   Restrictions Restrictions/Precautions  Restrictions/Precautions: General Precautions, Weight Bearing, Fall Risk (LLE NWB, order in for immobilzer, but no order for wearing one)      Communication with other providers: [x]   OK to see per nursing:     []   Spoke with team member regarding:      Subjective observations and cognitive status: Pt seen in semi-atkins's position in bed at beginning of treatment. Agreeable to therapy. Pain level/location: 4/10       Location: left LE   Discharge recommendations  Anticipated discharge date:  2022  Destination: []home alone   []home alone with assist PRN     [x] home w/ family      [] Continuous supervision  []SNF    [] Assisted living     [] Other:  Continued therapy: [x]HHC PT  []OUTPATIENT PT   [] No Further PT  []SNF PT  Caregiver training recommended: [x]Yes  [] No   Equipment needs: 2WW, manual w/c with elevating leg rests and anti-tippers  Semaj Marquez requires the assistance of a front-wheeled walker to successfully ambulate from room to room at home to allow completion of daily living tasks such as: bathing, toileting, dressing and grooming. A wheeled walker is necessary due to the patient's unsteady gait, upper body weakness, inability to  a standard walker. This patient can ambulate only by pushing a walker instead of using a lesser assistive device such as a cane or crutch.    Semaj Marquez requires a standard wheelchair to successfully complete daily living tasks such as: bathing, toileting, dressing and grooming; or any other daily living task in the home.  A standard manual wheelchair is necessary due to patient's impaired ambulation and mobility restrictions and would be unable to resolve these daily living tasks using a cane or walker. The patient is capable of using/self-propelling a standard wheelchair safely in their home and can maneuver within their home with adequate access in a reasonable amount of time. The patient has not expressed an unwillingness to use the wheelchair. Having this wheelchair would enable Albina Brown ability to regularly participate in the above mentioned daily living tasks around the home. There is a caregiver available to provide necessary assistance. Expected length of need is at least 6 mos. Albina Brown does not have any infectious diagnoses. Albina Brown can self propel a wheelchair and needs anti-rollback device because of ramps.      Albina Brown requires elevated leg rests due to a musculoskeletal condition which prevents 90 degree flexion in the knee and due to significant edema of the lower extremeties that requires an elevating leg rest.     Bed Mobility:           [x]   Pt received out of bed   Lying --> Sit:  Setup assist with bed rails   Sit --> lying:  Setup assist with bed rails     Transfers:    Sit--> Stand: SBA  Stand --> Sit:   SBA  Chair-->Bed/Bed --> Chair:   SBA  Assistive device required for transfer:   RW  Pt did well     Wheelchair Propulsion:  Distance:  150'+150'   Assistance:  mod I   Extremities Used:   Frandy  Type:    [x]  Manual        []  Electric    Uneven Surfaces: (2 trials)       Assistance:    CGA  Device:    RW  Surfaces Completed:   [x] Carpeted Surface with bean bags beneath      []  Throw rugs       []  Ramp       []  Outdoor pavements        []  Grass             []  Loose gravel        []  Other:       In // bars: pt amb up/down 4\" curb step CGA in // bars  Pt amb up/down 6\" curb step CGA in // bars  Pt amb up/down 8\" curb step CGA x 2 in // bars   Pt amb up/down 6\" curb step CGA x 2 with RW in // bars  Pt amb up/down 8\" curb step CGA x 2 with RW in // bars   Min vcs initially, pt required min vcs to position self closer to curb prior to hopping up onto the curb. Patient/Caregiver Education and Training:   [x]   Bed Mobility/Transfer technique/safety  [x]   Gait technique/sequencing  [x]   Proper use of assistive device  [x]   Advanced mobility safety and technique  [x]   Reinforced patient's precautions/mobility while maintaining precautions  []   Postural awareness  []   Family training    Treatment Plan for Next Session: gait; advanced gait; stair training      Assessment: This pt demonstrated a positive response to today's treatment as evidenced by improved mobility. The patient is making progress toward established goals as evidenced by QI scores. Ongoing deficits are observed in the areas of strength, balance, endurance, and safety and continued focus on this is recommended. Treatment/Activity Tolerance:   [x] Tolerated treatment with no adverse effects    [] Patient limited by fatigue  [] Patient limited by pain   [] Patient limited by medical complications:    [] Adverse reaction to Tx:   [] Significant change in status    Safety:       [x]  bed alarm set    []  chair alarm set    []  Pt refused alarms                []  Telesitter activated      [x]  Gait belt used during tx session      [x]other: pt left in semi-atkins's position in bed with call light at end of treatment.        Number of Minutes/Billable Intervention  Gait Training 30   Therapeutic Exercise    Neuro Re-Ed    Therapeutic Activity    Wheelchair Propulsion    Group    Other:    TOTAL 30         Social History  Social/Functional History  Lives With: Alone  Type of Home: House  Home Layout: One level  Home Access: Stairs to enter without rails  Entrance Stairs - Number of Steps: 1 (from front)would have to walk through grass, 2 steps through garage  Entrance Stairs - Rails: None  Bathroom Shower/Tub: Tub/Shower unit  Bathroom Toilet: Standard  Bathroom Equipment: Shower chair, Grab bars in shower  Bathroom Accessibility:  (pt unsure if bathroom big enough for walker)  Home Equipment: Marlen Hutchison  Has the patient had two or more falls in the past year or any fall with injury in the past year?: Yes (Pt has fallen 2-3 times in past year, this fall only one with injury)  Receives Help From: Family (pt needed assist with cleaning and anything requiring prolonged standing or activity tolerance due to B foot pain and decreased endurance)  ADL Assistance: [place]: Needs assistance  Homemaking Responsibilities: Yes  Meal Prep Responsibility: Primary  Laundry Responsibility: Secondary (family primarily does)  Cleaning Responsibility: Secondary (pt only performs light cleaning occasionally, family performs the rest)  Bill Paying/Finance Responsibility: Primary  Shopping Responsibility: Primary  Health Care Management: Primary  Ambulation Assistance: Independent (no device in house or to go to mailbox (400' round trip))  Transfer Assistance: Independent  Active : Yes  Mode of Transportation: Truck  Occupation: On disability  Type of Occupation: Pt on disability due to cirrhosis  Additional Comments: pt admits he only cleans up himself or house when he absolutely has to at baseline.  sleeps in regular flat bed, but cannot sleep well on back    Objective                                                                                    Goals:  (Update in navigator)  Short Term Goals  Time Frame for Short Term Goals: 7-10 days  Short Term Goal 1: pt to complete all bed mobility mod I  Short Term Goal 2: Pt to complete sit to stand to/from bed, commode, and car transfers with  mod I  Short Term Goal 3: Pt will ambulate 50' on level surfaces and 10' on unlevel surfaces with 2ww and NWB LLE with mod I, supervision for 150' on level surface  Short Term Goal 4: Pt will ascend/descend curb step with 2ww and 4 steps with rail with CGA  Short Term Goal 5: Pt will  object with reacher and 2ww with mod I  Additional Goals?: Yes  Short Term Goal 6: pt will propel mod I in w/c for >500':   :        Plan of Care                                                                              Times per week: 5 days per week for a minimum of 60 minutes/day plus group as appropriate for 60 minutes.   Treatment to include Current Treatment Recommendations: Strengthening, Balance training, Functional mobility training, Transfer training, Endurance training, IADL training, Gait training, Stair training, Neuromuscular re-education, Return to work related activity, Pain management, Patient/Caregiver education & training, Equipment evaluation, education, & procurement, Therapeutic activities, Positioning, Safety education & training    Electronically signed by   Napoleon Hinojosa, [id number]  12/5/2022, 12:11 PM

## 2022-12-05 NOTE — PROGRESS NOTES
Occupational Therapy    Physical Rehabilitation: OCCUPATIONAL THERAPY     [x] daily progress note       [] discharge       Patient Name:  Fred Chan   :  1970 MRN: [id number]  Room:  [address] [place] [address] Date of Admission: 2022  Rehabilitation Diagnosis:   Closed fracture of lateral portion of left tibial plateau, initial encounter [S82.122A]       Date 2022       Day of ARU Week:  7   Time IN/OUT 0840/1010   Individual Tx Minutes 90   Group Tx Minutes    Co-Treat Minutes    Concurrent Tx Minutes    TOTAL Tx Time Mins 90   Variance Time    Variance Time []   Refusal due to:     []   Medical hold/reason:    []   Illness   []   Off Unit for test/procedure  []   Extra time needed to complete task  []   Therapeutic need  []   Other (specify):   Restrictions Restrictions/Precautions: General Precautions, Weight Bearing, Fall Risk (LLE NWB, order in for immobilzer, but no order for wearing one)         Communication with other providers: [x]   OK to see per nursing:     []   Spoke with team member regarding:      Subjective observations and cognitive status: Pt in high-atkins's position upon entrance, finishing up breakfast. Pt pleasant and agreeable to therapy. Pt does however request not to perform shower until tomorrow because he believes he will be \"having diarrhea\" a lot today as he took multiple medications as he was constipated.       Pain level/location:   3 /10       Location: L knee, lower leg, ankle, foot   Discharge recommendations  Anticipated discharge date:    Destination: []home alone   [x]home alone w assist prn   [] home w/ family    [] Continuous supervision       []SNF    [] Assisted living     [] Other:   Continued therapy: [x]HHC OT  []OUTPATIENT  OT   [] No Further OT  Equipment needs: Fred Chan requires the assistance of a tub transfer bench to successfully and safely complete bathing activities necessary due to reduced balance, endurance, need for ADL assist, and LE weakness and reduced ROM. These tasks cannot be safely completed without this device and would place Ascencion Choi at greater risk of falls. ADLs:    UB Dressing: Upper Body Dressing  Assistance Needed: Setup or clean-up assistance  Comment: Setup assist to don pullover shirt. CARE Score: 5  Discharge Goal: Independent         LB Dressing: Lower Body Dressing  Assistance Needed: Setup or clean-up assistance  Comment: Setup assist to don pajama pants. CARE Score: 5  Discharge Goal: Independent    Donning and [place] Footwear: Putting On/Taking Off Footwear  Assistance Needed: Setup or clean-up assistance  Comment: Setup assist to don B socks as well as R shoe (using reacher and sock aide for LLE). CARE Score: 5  Discharge Goal: Independent      Bed Mobility:           []   Pt received out of bed   Supine --> Sit:  IND  Sit --> Supine:  IND    Transfers:    Sit--> Stand:  SBA  Stand --> Sit:   SBA  Stand-Pivot:   SBA  Other:    Assistive device required for transfer:   2WW      Functional Mobility:    Assistance:  SBA with 2ww in room and in kitchen area. Mod I propelling self in w/c 130 ft x 2. Device:   [x]   Rolling Walker     []   Standard Joyce Jaret [x]   Wheelchair        []   Desi Katelyn       []   4-Wheeled Joyce Jaret         []   Cardiac Walker       []   Other:        Homemaking: Pt educated on walker safety with emphasis on IADL task completion. Pt then completed meal prep activity requiring use of microwave with CG/SBA using RW, with fair-good carryover of safety education noted requiring a couple cues.         Additional Therapeutic activities/exercises completed this date:     [x]   ADL Training   [x]   Balance/Postural training     [x]   Bed/Transfer Training   [x]   Endurance Training   []   Neuromuscular Re-ed   []   Nu-step:  Time:        Level:         #Steps:       []   Rebounder:    []  Seated     []  Standing        []   Supine Ther Ex (reps/sets):     [x]   Seated Ther Ex (reps/sets):  Pt performed 20 mins on arm bike with Mod resistance. Pt required no rest breaks. Pt then completed 1 set x15 reps of the following exercises using purple theraband: alternating shoulder presses, chest presses, elbow extension, shoulder horizontal abduction; and simultaneous shoulder horizontal abduction/adduction. Pt completed all exercise to increase BUE strength and endurance for safe performance of ADLs. []   Standing Ther Ex (reps/sets):     []   Other:      Comments: All intervention performed to increase pt's strength, endurance, ax tolerance, and balance in prep for increased I c ADL/IADLs and functional transfers/mobility. Patient/Caregiver Education and Training:   [x]   Adaptive Equipment Use  [x]   Bed Mobility/Transfer Technique/Safety  []   Energy Conservation Tips  []   Family training  []   Postural Awareness  [x]   Safety During Functional Activities  []   Reinforced Patient's Precautions   []   Progress was updated and reviewed in Rehabtracker with patient and/or family this         date. Treatment Plan for Next Session: Continue OT POC      Assessment: This pt demonstrated a positive response to today's treatment as evidenced by completion of kitchen task with CG/SBA using 2WW. The patient is making good progress toward established goals as evidenced by QI scores. Ongoing deficits are observed in the areas of functional balance, strength, ADLs, and [facility] and continued focus on this is recommended.        Treatment/Activity Tolerance:   [x] Tolerated treatment with no adverse effects    [] Patient limited by fatigue  [] Patient limited by pain   [] Patient limited by medical complications:    [] Adverse reaction to Tx:   [] Significant change in status    Safety:       [x]  bed alarm set    []  chair alarm set    []  Pt refused alarms                []  Telesitter activated      [x]  Gait belt used during tx session      []other:       Number of Minutes/Billable Intervention  Therapeutic Exercise 30   ADL Self-care 20   Neuro Re-Ed    Therapeutic Activity 40   Group    Other:    TOTAL 90       Social History  Social/Functional History  Lives With: Alone  Type of Home: House  Home Layout: One level  Home Access: Stairs to enter without rails  Entrance Stairs - Number of Steps: 1 (from front)would have to walk through grass, 2 steps through garage  Entrance Stairs - Rails: None  Bathroom Shower/Tub: Tub/Shower unit  Bathroom Toilet: Standard  Bathroom Equipment: Shower chair, Grab bars in shower  Bathroom Accessibility:  (pt unsure if bathroom big enough for walker)  Home Equipment: Bailon Saini  Has the patient had two or more falls in the past year or any fall with injury in the past year?: Yes (Pt has fallen 2-3 times in past year, this fall only one with injury)  Receives Help From: Family (pt needed assist with cleaning and anything requiring prolonged standing or activity tolerance due to B foot pain and decreased endurance)  ADL Assistance: Independent  Homemaking Assistance: Needs assistance  Homemaking Responsibilities: Yes  Meal Prep Responsibility: Primary  Laundry Responsibility: Secondary (family primarily does)  Cleaning Responsibility: Secondary (pt only performs light cleaning occasionally, family performs the rest)  Bill Paying/Finance Responsibility: Primary  Shopping Responsibility: Primary  Health Care Management: Primary  Ambulation Assistance: Independent (no device in house or to go to mailbox (400' round trip))  Transfer Assistance: Independent  Active : Yes  Mode of Transportation: Truck  Occupation: On disability  Type of Occupation: Pt on disability due to cirrhosis  Additional Comments: pt admits he only cleans up himself or house when he absolutely has to at baseline.  sleeps in regular flat bed, but cannot sleep well on back    Objective                                                                                    Goals:  (Update in navigator)  Short Term Goals  Time Frame for Short Term Goals: STGs=LTGs:  Long Term Goals  Time Frame for Long Term Goals : 7-10 days or until d/c. Long Term Goal 1: Pt will complete self feeding with Mod I.  Long Term Goal 2: Pt will complete oral hygiene and grooming tasks with Mod I.  Long Term Goal 3: Pt will complete total body bathing with AE PRN and Mod I.  Long Term Goal 4: Pt will complete UB dressing with IND. Long Term Goal 5: Pt will complete LB dressing with AE PRN and Mod I. Additional Goals?: Yes  Long Term Goal 6: Pt will doff/don footwear with AE PRN and Mod I.  Long Term Goal 7: Pt will perform toileting with Mod I.  Long Term Goal 8: Pt will perform functional transfers (bed, chair, toilet, shower) with DME PRN and Mod I.  Long Term Goal 9: Pt will perform therex/therax to facilitate an increase in strength/endurance (with emphasis on dynamic standing balance/tolerance > 8 mins) with Mod I.  Long Term Goal 10: Pt will perform meal prep with Mod I.:        Plan of Care                                                                              Times per week: 5 days per week for a minimum of 60 minutes/day plus group as appropriate for 60 minutes.   Treatment to include Occupational Therapy Plan  Current Treatment Recommendations: Strengthening, ROM, Balance training, Functional mobility training, Endurance training, Pain management, Safety education & training, Patient/Caregiver education & training, Equipment evaluation, education, & procurement, Positioning, Self-Care / ADL, Home management training, Coordination training    Electronically signed by   SUNSHINE Aviles, OTR/L #[id number]  12/5/2022, 1:33 PM

## 2022-12-06 LAB
ANION GAP SERPL CALCULATED.3IONS-SCNC: 13 MMOL/L (ref 4–16)
BUN BLDV-MCNC: 16 MG/DL (ref 6–23)
CALCIUM SERPL-MCNC: 9.4 MG/DL (ref 8.3–10.6)
CHLORIDE BLD-SCNC: 99 MMOL/L (ref 99–110)
CO2: 24 MMOL/L (ref 21–32)
CREAT SERPL-MCNC: 0.6 MG/DL (ref 0.9–1.3)
GFR SERPL CREATININE-BSD FRML MDRD: >60 ML/MIN/1.73M2
GLUCOSE BLD-MCNC: 111 MG/DL (ref 70–99)
HCT VFR BLD CALC: 37.7 % (ref 42–52)
HEMOGLOBIN: 12.8 GM/DL (ref 13.5–18)
MCH RBC QN AUTO: 33.1 PG (ref 27–31)
MCHC RBC AUTO-ENTMCNC: 34 % (ref 32–36)
MCV RBC AUTO: 97.4 FL (ref 78–100)
PDW BLD-RTO: 13.5 % (ref 11.7–14.9)
PLATELET # BLD: 153 K/CU MM (ref 140–440)
PMV BLD AUTO: 10.5 FL (ref 7.5–11.1)
POTASSIUM SERPL-SCNC: 4.5 MMOL/L (ref 3.5–5.1)
RBC # BLD: 3.87 M/CU MM (ref 4.6–6.2)
SODIUM BLD-SCNC: 136 MMOL/L (ref 135–145)
WBC # BLD: 9.9 K/CU MM (ref 4–10.5)

## 2022-12-06 PROCEDURE — 80048 BASIC METABOLIC PNL TOTAL CA: CPT

## 2022-12-06 PROCEDURE — 6370000000 HC RX 637 (ALT 250 FOR IP): Performed by: STUDENT IN AN ORGANIZED HEALTH CARE EDUCATION/TRAINING PROGRAM

## 2022-12-06 PROCEDURE — 97535 SELF CARE MNGMENT TRAINING: CPT

## 2022-12-06 PROCEDURE — 6360000002 HC RX W HCPCS: Performed by: STUDENT IN AN ORGANIZED HEALTH CARE EDUCATION/TRAINING PROGRAM

## 2022-12-06 PROCEDURE — 94761 N-INVAS EAR/PLS OXIMETRY MLT: CPT

## 2022-12-06 PROCEDURE — 97542 WHEELCHAIR MNGMENT TRAINING: CPT

## 2022-12-06 PROCEDURE — 97530 THERAPEUTIC ACTIVITIES: CPT

## 2022-12-06 PROCEDURE — 94150 VITAL CAPACITY TEST: CPT

## 2022-12-06 PROCEDURE — 6370000000 HC RX 637 (ALT 250 FOR IP): Performed by: PHYSICAL MEDICINE & REHABILITATION

## 2022-12-06 PROCEDURE — 1280000000 HC REHAB R&B

## 2022-12-06 PROCEDURE — 36415 COLL VENOUS BLD VENIPUNCTURE: CPT

## 2022-12-06 PROCEDURE — 85027 COMPLETE CBC AUTOMATED: CPT

## 2022-12-06 PROCEDURE — 97116 GAIT TRAINING THERAPY: CPT

## 2022-12-06 RX ORDER — OXYCODONE HYDROCHLORIDE 5 MG/1
5 TABLET ORAL EVERY 6 HOURS PRN
Qty: 20 TABLET | Refills: 0 | Status: SHIPPED | OUTPATIENT
Start: 2022-12-06 | End: 2022-12-13

## 2022-12-06 RX ORDER — IBUPROFEN 400 MG/1
400 TABLET ORAL EVERY 6 HOURS PRN
Qty: 120 TABLET | Refills: 3 | COMMUNITY
Start: 2022-12-06

## 2022-12-06 RX ORDER — POLYETHYLENE GLYCOL 3350 17 G/17G
17 POWDER, FOR SOLUTION ORAL DAILY PRN
Qty: 527 G | Refills: 1 | COMMUNITY
Start: 2022-12-06 | End: 2023-01-05

## 2022-12-06 RX ORDER — M-VIT,TX,IRON,MINS/CALC/FOLIC 27MG-0.4MG
1 TABLET ORAL DAILY
COMMUNITY
Start: 2022-12-07

## 2022-12-06 RX ORDER — ENOXAPARIN SODIUM 100 MG/ML
40 INJECTION SUBCUTANEOUS DAILY
Status: DISCONTINUED | OUTPATIENT
Start: 2022-12-07 | End: 2022-12-07 | Stop reason: HOSPADM

## 2022-12-06 RX ORDER — ENOXAPARIN SODIUM 100 MG/ML
40 INJECTION SUBCUTANEOUS DAILY
Qty: 14 EACH | Refills: 0 | Status: SHIPPED | OUTPATIENT
Start: 2022-12-07 | End: 2022-12-21

## 2022-12-06 RX ADMIN — IBUPROFEN 400 MG: 400 TABLET ORAL at 21:58

## 2022-12-06 RX ADMIN — LACTULOSE 30 G: 10 SOLUTION ORAL at 21:09

## 2022-12-06 RX ADMIN — OXYCODONE HYDROCHLORIDE 5 MG: 5 TABLET ORAL at 21:58

## 2022-12-06 RX ADMIN — OXYCODONE HYDROCHLORIDE 5 MG: 5 TABLET ORAL at 15:36

## 2022-12-06 RX ADMIN — SPIRONOLACTONE 150 MG: 50 TABLET ORAL at 12:52

## 2022-12-06 RX ADMIN — POLYETHYLENE GLYCOL (3350) 17 G: 17 POWDER, FOR SOLUTION ORAL at 05:37

## 2022-12-06 RX ADMIN — Medication 1 TABLET: at 09:38

## 2022-12-06 RX ADMIN — ENOXAPARIN SODIUM 30 MG: 100 INJECTION SUBCUTANEOUS at 09:39

## 2022-12-06 RX ADMIN — OXYCODONE HYDROCHLORIDE 5 MG: 5 TABLET ORAL at 09:39

## 2022-12-06 RX ADMIN — LACTULOSE 30 G: 10 SOLUTION ORAL at 09:44

## 2022-12-06 RX ADMIN — IBUPROFEN 400 MG: 400 TABLET ORAL at 15:36

## 2022-12-06 RX ADMIN — BISACODYL 10 MG: 5 TABLET, COATED ORAL at 06:25

## 2022-12-06 RX ADMIN — IBUPROFEN 400 MG: 400 TABLET ORAL at 09:39

## 2022-12-06 RX ADMIN — FUROSEMIDE 60 MG: 40 TABLET ORAL at 09:38

## 2022-12-06 ASSESSMENT — PAIN DESCRIPTION - LOCATION
LOCATION: LEG

## 2022-12-06 ASSESSMENT — PAIN - FUNCTIONAL ASSESSMENT
PAIN_FUNCTIONAL_ASSESSMENT: ACTIVITIES ARE NOT PREVENTED
PAIN_FUNCTIONAL_ASSESSMENT: PREVENTS OR INTERFERES SOME ACTIVE ACTIVITIES AND ADLS
PAIN_FUNCTIONAL_ASSESSMENT: PREVENTS OR INTERFERES SOME ACTIVE ACTIVITIES AND ADLS

## 2022-12-06 ASSESSMENT — PAIN DESCRIPTION - ORIENTATION
ORIENTATION: LEFT

## 2022-12-06 ASSESSMENT — PAIN DESCRIPTION - DESCRIPTORS
DESCRIPTORS: ACHING;DISCOMFORT
DESCRIPTORS: ACHING

## 2022-12-06 ASSESSMENT — PAIN DESCRIPTION - FREQUENCY
FREQUENCY: INTERMITTENT
FREQUENCY: INTERMITTENT

## 2022-12-06 ASSESSMENT — PAIN SCALES - WONG BAKER: WONGBAKER_NUMERICALRESPONSE: 0

## 2022-12-06 ASSESSMENT — PAIN SCALES - GENERAL
PAINLEVEL_OUTOF10: 4
PAINLEVEL_OUTOF10: 4
PAINLEVEL_OUTOF10: 6
PAINLEVEL_OUTOF10: 2
PAINLEVEL_OUTOF10: 4

## 2022-12-06 ASSESSMENT — PAIN DESCRIPTION - PAIN TYPE
TYPE: SURGICAL PAIN
TYPE: SURGICAL PAIN

## 2022-12-06 ASSESSMENT — PAIN DESCRIPTION - ONSET: ONSET: ON-GOING

## 2022-12-06 NOTE — PROGRESS NOTES
Physical Therapy    [x] daily progress note       [] discharge       Patient Name:  Scott Santoro   :  1970 MRN: [id number]  Room:  [address] [place] [address] Date of Admission: 2022  Rehabilitation Diagnosis:   Closed fracture of lateral portion of left tibial plateau, initial encounter [S82.122A]       Date 2022       Day of ARU Week:  1   Time IN//1030  1400/1450   Individual Tx Minutes 60+50   Group Tx Minutes    Co-Treat Minutes    Concurrent Tx Minutes    TOTAL Tx Time Mins 110   Variance Time -10   Variance Time []   Refusal due to:     []   Medical hold/reason:    []   Illness   []   Off Unit for test/procedure  []   Extra time needed to complete task  []   Therapeutic need  [x]   Other (specify): extra time with ROWLAND   Restrictions Restrictions/Precautions  Restrictions/Precautions: General Precautions, Weight Bearing, Fall Risk (LLE NWB, order in for immobilzer, but no order for wearing one)      Interdisciplinary communication [x]   Cleared for therapy per nursing     []   RN notified about issues during session  []   RN updated on pt performance  []   Spoke with   []   Spoke with OT  []   Spoke with MD  []   Other:    Subjective observations and cognitive status: (AM) Pt resting in w/c with LLE positioned on elevating leg rest, reports tolerating shower activity earlier today, states that his mother is planning to come in later this PM to learn how she can help him. (PM) Pt resting in w/c, alert and pleasant, mother present in room for CGT. Pt and caregiver shared their plan of using a modified ramp to use temporarily until the actual ramp is available. Caregiver confirmed initiating home modifications inside pt's home to make it walker and wheelchair accessible.    Pain level/location: 3/10 at rest      Location: LLE   7/10     R foot with long distance ambulation    Discharge recommendations  Anticipated discharge date:  2022  Destination: []home alone   []home alone with assist PRN     [x] home w/ family      [] Continuous supervision  []SNF    [] Assisted living     [] Other:  Continued therapy: [x]HHC PT  []OUTPATIENT PT   [] No Further PT  []SNF PT  Caregiver training recommended: [x]Yes  [] No   Equipment needs: 2WW, manual w/c with elevating leg rests and anti-tippers; ramp   Bonnie Bonilla requires the assistance of a front-wheeled walker to successfully ambulate from room to room at home to allow completion of daily living tasks such as: bathing, toileting, dressing and grooming. A wheeled walker is necessary due to the patient's unsteady gait, upper body weakness, inability to  a standard walker. This patient can ambulate only by pushing a walker instead of using a lesser assistive device such as a cane or crutch. Bonnie Bonilla requires a standard wheelchair to successfully complete daily living tasks such as: bathing, toileting, dressing and grooming; or any other daily living task in the home. A standard manual wheelchair is necessary due to patient's impaired ambulation and mobility restrictions and would be unable to resolve these daily living tasks using a cane or walker. The patient is capable of using/self-propelling a standard wheelchair safely in their home and can maneuver within their home with adequate access in a reasonable amount of time. The patient has not expressed an unwillingness to use the wheelchair. Having this wheelchair would enable Bonnie Bonilla ability to regularly participate in the above mentioned daily living tasks around the home. There is a caregiver available to provide necessary assistance. Expected length of need is at least 6 mos. Bonnie Bonilla does not have any infectious diagnoses. Bonnie Bonilla can self propel a wheelchair and needs anti-rollback device because of ramps.      oBnnie Bonilla requires elevated leg rests due to a musculoskeletal condition which prevents 90 degree flexion in the knee and due to significant edema of the lower extremeties that requires an elevating leg rest.     PT IRF-JUDE scores and goals for discharge assessment:   Roll Left and Right  Assistance Needed: Independent  Comment: Ind in regular bed  CARE Score: 6  Discharge Goal: Independent    Sit to Lying  Assistance Needed: Independent  Comment: Ind in regular bed  CARE Score: 6  Discharge Goal: Independent    Lying to Sitting on Side of Bed  Assistance Needed: Independent  Comment: Ind in regular bed  CARE Score: 6  Discharge Goal: Independent    Sit to Stand  Assistance Needed: Independent  Comment: Mod Ind with 2WW following NWB on LLE  CARE Score: 6  Discharge Goal: Independent    Chair/Bed-to-Chair Transfer  Assistance Needed: Independent  Comment: Mod Ind using squat pivot leading with RLE/LLE; pt Ind positions w/c and manages w/c parts appropriately and safely prior to bed<->w/c transfers  CARE Score: 6  Discharge Goal: Independent    Car Transfer  Assistance Needed: Independent  Comment: Mod Ind using 2WW following NWB on LLE  CARE Score: 6  Discharge Goal: Independent    Walk 10 Feet? Walk 10 Feet?: Yes    1 Step  1 Step?: Yes    Picking Up Object  Assistance Needed: Independent  Comment: Mod Ind with 2WW and reacher following NWB on LLE throughout required tasks  CARE Score: 6  Discharge Goal: Independent    Wheelchair Ability  Uses a Wheelchair and/or Scooter?: Yes    Wheel 50 Feet with Two Turns  Assistance Needed: Independent  Comment: Mod Ind using BUE  CARE Score: 6  Discharge Goal: Independent    Wheel 150 Feet  Assistance Needed: Independent  Comment: Mod Ind using BUE  CARE Score: 6  Discharge Goal: Independent       Walking Ability  Does the Patient Walk?: Yes    Walk 10 Feet  Assistance Needed: Independent  Comment: Mod Ind using 2WW following NWB on LLE  CARE Score: 6  Discharge Goal: Independent    Walk 50 Feet with Two Turns  Assistance Needed: Independent  Comment:  Mod Ind using 2WW following NWB on LLE  CARE Score: 6  Discharge Goal: Independent    Walk 150 Feet  Assistance Needed: Supervision or touching assistance  Comment: pt was able to complete this mobility task today with SBA-CGA using 2WW following NWB on LLE; visible tremors on upper body and RLE observed due to increased fatigue and worsening R foot pain (7/10) that affected pt's gait quality  CARE Score: 4  Discharge Goal: Supervision or touching assistance    Walking 10 Feet on Uneven Surfaces  Assistance Needed: Supervision or touching assistance  Comment: SBA-CGA using 2WW following NWB on LLE  CARE Score: 4  Discharge Goal: Independent    1 Step (Curb)  Assistance Needed: Dependent  Comment: Min A x 2 using 2WW to ascend/descend 4\" step height following NWB on LLE; required use of modified technique for safety (backward hopping to ascend) due to impaired strength, balance, and limited L knee flexion to fully clear over step  CARE Score: 1  Discharge Goal: Supervision or touching assistance      4 Steps  Comment: pt unable; high fall risk; safety concerns related to impaired strength, balance, and endurance with NWB on LLE even with 2-person assist  Reason if not Attempted: Not attempted due to medical condition or safety concerns  CARE Score: 88  Discharge Goal: Supervision or touching assistance    12 Steps  Comment: pt unable; high fall risk; safety concerns related to impaired strength, balance, and endurance with NWB on LLE even with 2-person assist  Reason if not Attempted: Not attempted due to medical condition or safety concerns  CARE Score: 88  Discharge Goal: Not Attempted      Patient/Caregiver Education and Training:   []   Role of PT  []   Education about Dx  []   Use of call light for assist   [x]   HEP provided and explained   [x]   Treatment plan reviewed  [x]   Home safety  []   Wheelchair mobility/management   []   Body mechanics  []   Bed Mobility/Transfer technique  []   Gait technique/sequencing  []   Proper use of assistive device/adaptive equipment  []   Stair training/Advanced mobility safety and technique  []   Reinforced patient's precautions/mobility while maintaining precautions  []   Postural awareness  [x]   Family/caregiver training/education: Pt's mother educated and trained on management of manual w/c and 2WW (folding and unfolding; placement of L elevating leg rest), assisting pt at w/c level on single step that is deep enough to accommodate a w/c, for example a sidewalk step (pt provided with visual demonstration on proper sequencing and technique for safety and was able to return demonstrate with additional assist from PT for safety due to her unpredictable strength to manage weight of w/c with pt on it), providing steadying assist if pt is to ambulate over short, uneven surfaces; use of gait belt to safely assist pt with upright standing activities. [x]   Progress was updated and reviewed  with patient and family this date. [x]   Other: recommended technique of assisting him at w/c level by family member to safely manage home entrance with pending ramp installation next week    Treatment Plan for Next Session: discharge to home     Assessment:  Pt and caregiver (mother) were receptive to education provided today for increased safety at home especially technique to safely assist him at w/c level to manage home entrance. Ramp installation continues to be highly recommended for safety and accessibility.        Treatment/Activity Tolerance:   [] Tolerated treatment with no adverse effects    [x] Patient limited by fatigue  [x] Patient limited by pain   [] Patient limited by medical complications:    [] Adverse reaction to Tx:   [] Significant change in status    Safety:       [x]  bed alarm set    []  chair alarm set    []  Pt refused alarms                []  Telesitter activated      [x]  Gait belt used during tx session      []other:       Number of Minutes/Billable Intervention  Gait Training 50   Therapeutic Exercise    Neuro Re-Ed Therapeutic Activity 50   Wheelchair Propulsion 10   Group    Other:    TOTAL 110     Social History  Social/Functional History  Lives With: Alone  Type of Home: House  Home Layout: One level  Home Access: Stairs to enter without rails  Entrance Stairs - Number of Steps: 1 (from front)would have to walk through grass, 2 steps through garage  Entrance Stairs - Rails: None  Bathroom Shower/Tub: Tub/Shower unit  Bathroom Toilet: Standard  Bathroom Equipment: Shower chair, Grab bars in shower  Bathroom Accessibility:  (pt unsure if bathroom big enough for walker)  Home Equipment: Kayden Saez  Has the patient had two or more falls in the past year or any fall with injury in the past year?: Yes (Pt has fallen 2-3 times in past year, this fall only one with injury)  Receives Help From: Family (pt needed assist with cleaning and anything requiring prolonged standing or activity tolerance due to B foot pain and decreased endurance)  ADL Assistance: [facility] [address]: Needs assistance  Homemaking Responsibilities: Yes  Meal Prep Responsibility: Primary  Laundry Responsibility: Secondary (family primarily does)  Cleaning Responsibility: Secondary (pt only performs light cleaning occasionally, family performs the rest)  Bill Paying/Finance Responsibility: Primary  Shopping Responsibility: Primary  Health Care Management: Primary  Ambulation Assistance: Independent (no device in house or to go to mailbox (400' round trip))  Transfer Assistance: Independent  Active : Yes  Mode of Transportation: Truck  Occupation: On disability  Type of Occupation: Pt on disability due to cirrhosis  Additional Comments: pt admits he only cleans up himself or house when he absolutely has to at baseline.  sleeps in regular flat bed, but cannot sleep well on back    Objective                                                                                    Goals:  (Update in navigator)  Short Term Goals  Time Frame for Short Term Goals: 7-10 days  Short Term Goal 1: pt to complete all bed mobility mod I  Short Term Goal 2: Pt to complete sit to stand to/from bed, commode, and car transfers with  mod I  Short Term Goal 3: Pt will ambulate 50' on level surfaces and 10' on unlevel surfaces with 2ww and NWB LLE with mod I, supervision for 150' on level surface  Short Term Goal 4: Pt will ascend/descend curb step with 2ww and 4 steps with rail with CGA  Short Term Goal 5: Pt will  object with reacher and 2ww with mod I  Additional Goals?: Yes  Short Term Goal 6: pt will propel mod I in w/c for >500':   :        Plan of Care                                                                              Times per week: 5 days per week for a minimum of 60 minutes/day plus group as appropriate for 60 minutes.   Treatment to include Current Treatment Recommendations: Strengthening, Balance training, Functional mobility training, Transfer training, Endurance training, IADL training, Gait training, Stair training, Neuromuscular re-education, Return to work related activity, Pain management, Patient/Caregiver education & training, Equipment evaluation, education, & procurement, Therapeutic activities, Positioning, Safety education & training    Electronically signed by   Arlen Medel PT  12/6/2022, 3:05 PM

## 2022-12-06 NOTE — PROGRESS NOTES
Physical Rehabilitation: OCCUPATIONAL THERAPY     [x] daily progress note       [] discharge       Patient Name:  Albina Brown   :  1970 MRN: [id number]  Room:  [address] [place] [address] Date of Admission: 2022  Rehabilitation Diagnosis:   Closed fracture of lateral portion of left tibial plateau, initial encounter [S82.122A]       Date 2022       Day of ARU Week:  1   Time IN//925   Individual Tx Minutes 70   Group Tx Minutes    Co-Treat Minutes    Concurrent Tx Minutes    TOTAL Tx Time Mins 70   Variance Time    Variance Time []   Refusal due to:     []   Medical hold/reason:    []   Illness   []   Off Unit for test/procedure  []   Extra time needed to complete task  []   Therapeutic need  []   Other (specify):   Restrictions Restrictions/Precautions: General Precautions, Weight Bearing, Fall Risk (LLE NWB, order in for immobilzer, but no order for wearing one)         Communication with other providers: [x]   OK to see per nursing:     []   Spoke with team member regarding:      Subjective observations and cognitive status: Patient sitting up in bed upon approach, pleasant and agreeable to therapy    Pain level/location:    2/10       Location:  LLE   Discharge recommendations  Anticipated discharge date:    Destination: []home alone   [x]home alone w assist prn   [] home w/ family    [] Continuous supervision       []SNF    [] Assisted living     [] Other:   Continued therapy: [x]HHC OT  []OUTPATIENT  OT   [] No Further OT  Equipment needs:  Albina Brown requires the assistance of a tub transfer bench to successfully and safely complete bathing activities necessary due to reduced balance, endurance, need for ADL assist, and LE weakness and reduced ROM. These tasks cannot be safely completed without this device and would place Albina Brown at greater risk of falls.        ADLs:    Eating: Eating  Assistance Needed: Independent  Comment: X  CARE Score: 6  Discharge Goal: Independent       Oral Hygiene: Oral Hygiene  Assistance Needed: Independent  Comment: Lydia CABALLERO seated sinkside  CARE Score: 6  Discharge Goal: Independent    UB/LB Bathing: Shower/Bathe Self  Assistance Needed: Independent  Comment: Mod I seated for entirety of shower, uses LHS to bathe distal BLE and feet, weight shifts to reach mague area and buttocks  CARE Score: 6  Discharge Goal: Independent    UB Dressing: Upper Body Dressing  Assistance Needed: Independent  Comment: X  CARE Score: 6  Discharge Goal: Independent         LB Dressing: Lower Body Dressing  Assistance Needed: Independent  Comment: Mod I requires intermittent  unilateral support for balance managing pants oiver hips, uses reacher to thread LLE into pants  CARE Score: 6  Discharge Goal: Independent    Donning and [place] Footwear: Putting On/Taking Off Footwear  Assistance Needed: Independent  Comment: Lydia CABALLERO uses reacher and sock aide to LLE, independently donns sock and shoe to RLE  CARE Score: 6  Discharge Goal: Independent      Toileting: Toileting Hygiene  Assistance needed: Independent  Comment: Mod I in stance, unilateral support to grab bar for balance managing pants  CARE Score: 6  Discharge Goal: Independent      Toilet Transfers: Toilet Transfer  Assistance needed: Independent  Comment: Mod I  CARE Score: 6  Discharge Goal: Independent  Device Used:    [x]   Standard Toilet         [x]   Grab Bars           []  Bedside Commode       []   Elevated Toilet          []   Other:        Bed Mobility:           []   Pt received out of bed   Rolling R/L:    Scooting:  Ind  Supine --> Sit Ind  Sit --> Supine:  Ind    Transfers:    Sit--> Stand: Mod I   Stand --> Sit:   Mod I   Stand-Pivot: Mod I   Other:    Assistive device required for transfer:   RW      Functional Mobility:  throughout room/bathroom; patient sets self  up for shower  at  level    Assistance:   Mod I  Device:   [x]   Rolling Walker     []   Standard Walker []   Wheelchair        []   Flavio Hammers       [] Alfonso Whitten         []   Cardiac Walker       []   Other:        Homemaking Tasks:   Patient retrieves clothing from closet and transports to bathroom at [employer] level       Additional Therapeutic activities/exercises completed this date:     [x]   ADL Training   [x]   Balance/Postural training     [x]   Bed/Transfer Training   [x]   Endurance Training   []   Neuromuscular Re-ed   []   Nu-step:  Time:        Level:         #Steps:       []   Rebounder:    []  Seated     []  Standing        []   Supine Ther Ex (reps/sets):     []   Seated Ther Ex (reps/sets):     []   Standing Ther Ex (reps/sets):     []   Other:      Comments:      Patient/Caregiver Education and Training:   [x]   [employer] Equipment Use  [x]   Bed Mobility/Transfer Technique/Safety  [x]   Energy Conservation Tips  []   Family training  [x]   Postural Awareness  [x]   Safety During Functional Activities  [x]   Reinforced Patient's Precautions   []   Progress was updated and reviewed in Rehabtracker with patient and/or family this         date.     Treatment Plan for Next Session: POC to continue as tolerated           Treatment/Activity Tolerance:   [x] Tolerated treatment with no adverse effects    [] Patient limited by fatigue  [] Patient limited by pain   [] Patient limited by medical complications:    [] Adverse reaction to Tx:   [] Significant change in status    Safety:       [x]  bed alarm set    []  chair alarm set    []  Pt refused alarms                []  Telesitter activated      [x]  Gait belt used during tx session      []other:       Number of Minutes/Billable Intervention  Therapeutic Exercise    ADL Self-care 55   Neuro Re-Ed    Therapeutic Activity 15   Group    Other:    TOTAL 70       Social History  Social/Functional History  Lives With: Alone  Type of Home: House  Home Layout: One level  Home Access: Stairs to enter without rails  Entrance Stairs - Number of Steps: 1 (from front)would have to walk through grass, 2 steps through garage  Entrance Stairs - Rails: None  Bathroom Shower/Tub: Tub/Shower unit  Bathroom Toilet: Standard  Bathroom Equipment: Shower chair, Grab bars in shower  Bathroom Accessibility:  (pt unsure if bathroom big enough for walker)  Home Equipment: Michael Rasher  Has the patient had two or more falls in the past year or any fall with injury in the past year?: Yes (Pt has fallen 2-3 times in past year, this fall only one with injury)  Receives Help From: Family (pt needed assist with cleaning and anything requiring prolonged standing or activity tolerance due to B foot pain and decreased endurance)  ADL Assistance: [address]: Needs assistance  Homemaking Responsibilities: Yes  Meal Prep Responsibility: Primary  Laundry Responsibility: Secondary (family primarily does)  Cleaning Responsibility: Secondary (pt only performs light cleaning occasionally, family performs the rest)  Bill Paying/Finance Responsibility: Primary  Shopping Responsibility: Primary  Health Care Management: Primary  Ambulation Assistance: Independent (no device in house or to go to mailbox (400' round trip))  Transfer Assistance: Independent  Active : Yes  Mode of Transportation: Truck  Occupation: On disability  Type of Occupation: Pt on disability due to cirrhosis  Additional Comments: pt admits he only cleans up himself or house when he absolutely has to at baseline. sleeps in regular flat bed, but cannot sleep well on back    Objective                                                                                    Goals:  (Update in navigator)  Short Term Goals  Time Frame for Short Term Goals: STGs=LTGs:  Long Term Goals  Time Frame for Long Term Goals : 7-10 days or until d/c.   Long Term Goal 1: Pt will complete self feeding with Mod I.  Long Term Goal 2: Pt will complete oral hygiene and grooming tasks with Mod I.  Long Term Goal 3: Pt will complete total body bathing with AE PRN and Mod I.  Long Term Goal 4: Pt will complete UB dressing with IND. Long Term Goal 5: Pt will complete LB dressing with AE PRN and Mod I. Additional Goals?: Yes  Long Term Goal 6: Pt will doff/don footwear with AE PRN and Mod I.  Long Term Goal 7: Pt will perform toileting with Mod I.  Long Term Goal 8: Pt will perform functional transfers (bed, chair, toilet, shower) with DME PRN and Mod I.  Long Term Goal 9: Pt will perform therex/therax to facilitate an increase in strength/endurance (with emphasis on dynamic standing balance/tolerance > 8 mins) with Mod I.  Long Term Goal 10: Pt will perform meal prep with Mod I.:        Plan of Care                                                                              Times per week: 5 days per week for a minimum of 60 minutes/day plus group as appropriate for 60 minutes.   Treatment to include Occupational Therapy Plan  Current Treatment Recommendations: Strengthening, ROM, Balance training, Functional mobility training, Endurance training, Pain management, Safety education & training, Patient/Caregiver education & training, Equipment evaluation, education, & procurement, Positioning, Self-Care / ADL, Home management training, Coordination training    Electronically signed by   BOBY Mayen,  12/6/2022, 9:20 AM

## 2022-12-06 NOTE — CARE COORDINATION
[facility] declined patient. [facility]    [id number]X routed to [facility]    Case mgt met with patient in room. Confirmed WC/RW/TTB have been ordered. Patient requesting a grab bar. Patient's mother in room. She will provide dc transport tomorrow. She's here for caregiver training with Alicia Gomez mgt messaged Prerna @ [facility] D[facility] re: grab bar.

## 2022-12-06 NOTE — PROGRESS NOTES
Ivy العلي    : 1970  Acct #: [de-identified]  MRN: [id number]              PM&R Progress Note      Admitting diagnosis: Left lateral tibial plateau fracture ([address])     Comorbid diagnoses impacting rehabilitation: Generalized weakness, gait disturbance, uncontrolled pain, alcoholic liver disease, cirrhosis, thrombocytopenia, alcoholic neuropathy     Chief complaint: Fatigue with activities but he is napping more with a walker. No significant upper limb pain. Prior (baseline) level of function: Independent. Current level of function:         Current  IRF-JUDE and Goals:   Occupational Therapy:    Short Term Goals  Time Frame for Short Term Goals: STGs=LTGs :   Long Term Goals  Time Frame for Long Term Goals : 7-10 days or until d/c. Long Term Goal 1: Pt will complete self feeding with Mod I.  Long Term Goal 2: Pt will complete oral hygiene and grooming tasks with Mod I.  Long Term Goal 3: Pt will complete total body bathing with AE PRN and Mod I.  Long Term Goal 4: Pt will complete UB dressing with IND. Long Term Goal 5: Pt will complete LB dressing with AE PRN and Mod I. Additional Goals?: Yes  Long Term Goal 6: Pt will doff/don footwear with AE PRN and Mod I.  Long Term Goal 7: Pt will perform toileting with Mod I.  Long Term Goal 8: Pt will perform functional transfers (bed, chair, toilet, shower) with DME PRN and Mod I.  Long Term Goal 9: Pt will perform therex/therax to facilitate an increase in strength/endurance (with emphasis on dynamic standing balance/tolerance > 8 mins) with Mod I.  Long Term Goal 10: Pt will perform meal prep with Mod I. :                                       Eating: Eating  Assistance Needed: Independent  Comment: Pt was able to open packages and containers to eat breakfast IND. CARE Score: 6  Discharge Goal: Independent       Oral Hygiene: Oral Hygiene  Assistance Needed: Setup or clean-up assistance  Comment: Setup assist seated sinkside.   CARE Score: 5  Discharge Goal: Independent    UB/LB Bathing: Shower/Bathe Self  Assistance Needed: Partial/moderate assistance  Comment: Pt completed full shower, seated for entirety, weight shifting to wash rear. Pt requires assist washing distal LLE. CARE Score: 3  Discharge Goal: Independent    UB Dressing: Upper Body Dressing  Assistance Needed: Setup or clean-up assistance  Comment: Setup assist to don pullover shirt. CARE Score: 5  Discharge Goal: Independent         LB Dressing: Lower Body Dressing  Assistance Needed: Setup or clean-up assistance  Comment: Setup assist to don pajama pants. CARE Score: 5  Discharge Goal: Independent    Donning and [place] Footwear: Putting On/Taking Off Footwear  Assistance Needed: Setup or clean-up assistance  Comment: Setup assist to don B socks as well as R shoe (using reacher and sock aide for LLE). CARE Score: 5  Discharge Goal: Independent      Toiletin [address] needed: Supervision or touching assistance  Comment: CGA for clothing management to perform episode of urination in stance at toilet. CARE Score: 4  Discharge Goal: Independent      Toilet Transfers: Toilet Transfer  Assistance needed: Supervision or touching assistance  Comment: CGA with use of 2ww and Max verbal cues for safety as Pt impulsive with urgency.   CARE Score: 4  Discharge Goal: Independent    Physical Therapy:   Short Term Goals  Time Frame for Short Term Goals: 7-10 days  Short Term Goal 1: pt to complete all bed mobility mod I  Short Term Goal 2: Pt to complete sit to stand to/from bed, commode, and car transfers with  mod I  Short Term Goal 3: Pt will ambulate 50' on level surfaces and 10' on unlevel surfaces with 2ww and NWB LLE with mod I, supervision for 150' on level surface  Short Term Goal 4: Pt will ascend/descend curb step with 2ww and 4 steps with rail with CGA  Short Term Goal 5: Pt will  object with reacher and 2ww with mod I  Additional Goals?: Yes  Short Term Goal 6: pt will propel mod I in w/c for >500'            Bed Mobility:   Sit to Lying  Assistance Needed: Independent  Comment: Ind without use of bed features  CARE Score: 6  Discharge Goal: Independent  Roll Left and Right  Assistance Needed: Independent  Comment: Ind without use of bed features; pt self-assisted LLE using RLE to roll to R; pt able to tolerate rolling to L side with some aggravation of LLE pain (5/10)  CARE Score: 6  Discharge Goal: Independent  Lying to Sitting on Side of Bed  Assistance Needed: Independent  Comment: Ind without use of bed features towards L side of bed simulating home set-up  CARE Score: 6  Discharge Goal: Independent    Transfers:    Sit to Stand  Assistance Needed: Independent  Comment: Mod Ind with 2WW following NWB on LLE  CARE Score: 6  Discharge Goal: Independent  Chair/Bed-to-Chair Transfer  Assistance Needed: Supervision or touching assistance  Comment: SBA using squat pivot technique leading with RLE/LLE; pt able to position and manage brakes of w/c prior to transfers  CARE Score: 4  Discharge Goal: Independent     Car Transfer  Assistance Needed: Independent  Comment: Mod Ind using 2WW following NWB on LLE  Reason if not Attempted: Not attempted due to medical condition or safety concerns  CARE Score: 6  Discharge Goal: Independent    Ambulation:    Walking Ability  Does the Patient Walk?: Yes     Walk 10 Feet  Assistance Needed: Independent  Comment:  Mod Ind using 2WW following NWB on LLE  CARE Score: 6  Discharge Goal: Independent     Walk 50 Feet with Two Turns  Assistance Needed: Supervision or touching assistance  Comment: CGA using 2WW following NWB on LLE  CARE Score: 4  Discharge Goal: Independent     Walk 150 Feet  Comment: max tolerance was 65 ft (limited by fatigue)  Reason if not Attempted: Not attempted due to medical condition or safety concerns  CARE Score: 88  Discharge Goal: Supervision or touching assistance     Walking 10 Feet on Uneven Surfaces  Assistance Needed: Supervision or touching assistance  Comment: 4 (deemed usual performance per team huddle)  Reason if not Attempted: Not attempted due to medical condition or safety concerns  CARE Score: 4  Discharge Goal: Independent     1 Step (Curb)  Comment: 3 (deemed usual performance per team huddle)  Reason if not Attempted: Not attempted due to medical condition or safety concerns  CARE Score: 88  Discharge Goal: Supervision or touching assistance     4 Steps  Comment: feel pt may be able to attempt next session  Reason if not Attempted: Not attempted due to medical condition or safety concerns  CARE Score: 88  Discharge Goal: Supervision or touching assistance     12 Steps  Reason if not Attempted: Not attempted due to medical condition or safety concerns  CARE Score: 88  Discharge Goal: Not Attempted       Wheelchair:  w/c Ability: Wheelchair Ability  Uses a Wheelchair and/or Scooter?: Yes  Wheel 50 Feet with Two Turns  Assistance Needed: Independent  Comment: Mod Ind using BUE  CARE Score: 6  Discharge Goal: Independent  Wheel 150 Feet  Assistance Needed: Independent  Comment: Mod Ind using BUE  CARE Score: 6  Discharge Goal: Independent          Balance:        Object: Picking Up Object  Assistance Needed: Supervision or touching assistance  Comment: supervision with 2ww and reacher  CARE Score: 4  Discharge Goal: Independent    I      Exam:    Blood pressure 108/64, pulse 83, temperature 98.6 °F (37 °C), temperature source Oral, resp. rate 18, height 5' 5\" (1.651 m), weight 245 lb 9.5 oz (111.4 kg), SpO2 94 %. General: Up in wheelchair with his left leg elevated. Alert and oriented. HEENT: Neck supple. MMM. Clear speech. Pulmonary: Symmetric air exchange without coughing. Cardiac: RRR. Abdomen: Patient's abdomen is soft and nondistended. Bowel sounds were present throughout. There was no rebound, guarding or masses noted.     Upper extremities: Manipulating wheelchair around with good control. Strong . Lower extremities: No signs of DVT. Heels clear. Left leg incision clean and dry. No erythema. Sitting balance was good. Standing balance was fair-.    Lab Results   Component Value Date    WBC 6.7 11/27/2022    HGB 12.7 (L) 11/27/2022    HCT 36.9 (L) 11/27/2022    MCV 96.3 11/27/2022     11/27/2022     Lab Results   Component Value Date    INR 1.15 11/27/2022    PROTIME 14.9 (H) 11/27/2022     Lab Results   Component Value Date    CREATININE 0.6 (L) 11/27/2022    BUN 13 11/27/2022     (L) 11/27/2022    K 3.6 11/27/2022    CL 97 (L) 11/27/2022    CO2 27 11/27/2022     No results found for: ALT, AST, GGT, ALKPHOS, BILITOT    Expected length of stay  prior to a supervised level of function for discharge home with a wheelchair/walker and [place] [id number] OT/PT is 12/7/2022. Recommendations:    Left lateral tibial plateau fracture with gait disturbance: Improving endurance and balance noted with activities in the daily occupational and physical therapy. Working on techniques for self-care and mobility maintaining no weightbearing through the left lower limb. Immobilizer on the left knee at all times. His incision shows no signs of infection. Less erythema today and a negative venous Doppler study yesterday. Ongoing aggressive pulmonary hygiene measures, nutritional support, pain management and DVT prophylaxis. Outpatient follow-up with orthopedics in 2 weeks. His left lower limb remains swollen but it seems like what I would expect at this point. Verbal cues and CGA for transfers. .  DVT prophylaxis: Lovenox 30 mg SQ twice daily. His surgeon has committed him to a 6-week course. Planning on instruction for self administration of this medication. Monitoring his hemoglobin and platelet count periodically while on this medication. Periodically monitoring his thrombocytopenia. No clinical signs of blood loss. Uncontrolled pain: Off of injectable analgesics.   Reasonable pain control. He has oxycodone available as well. Cryotherapy. Initially we have been avoiding acetaminophen products while struggling with cirrhosis. He reports occasional use of Tylenol as an outpatient at the direction of his gastroenterologist.  Sometimes uses ibuprofen as well. Alcoholic liver disease: Chronulac 10 g twice daily. Cautious use of Tylenol products. Cautious diuresis. Neuropathy: Holding off with gabapentin. Weightbearing through his right lower limb is much as possible.

## 2022-12-07 VITALS
BODY MASS INDEX: 41.03 KG/M2 | HEART RATE: 92 BPM | SYSTOLIC BLOOD PRESSURE: 105 MMHG | HEIGHT: 65 IN | DIASTOLIC BLOOD PRESSURE: 59 MMHG | TEMPERATURE: 97.9 F | OXYGEN SATURATION: 100 % | RESPIRATION RATE: 18 BRPM | WEIGHT: 246.25 LBS

## 2022-12-07 PROCEDURE — 6360000002 HC RX W HCPCS: Performed by: PHYSICAL MEDICINE & REHABILITATION

## 2022-12-07 PROCEDURE — 94150 VITAL CAPACITY TEST: CPT

## 2022-12-07 PROCEDURE — 6370000000 HC RX 637 (ALT 250 FOR IP): Performed by: PHYSICAL MEDICINE & REHABILITATION

## 2022-12-07 PROCEDURE — 94761 N-INVAS EAR/PLS OXIMETRY MLT: CPT

## 2022-12-07 PROCEDURE — 6370000000 HC RX 637 (ALT 250 FOR IP): Performed by: STUDENT IN AN ORGANIZED HEALTH CARE EDUCATION/TRAINING PROGRAM

## 2022-12-07 RX ADMIN — ENOXAPARIN SODIUM 40 MG: 100 INJECTION SUBCUTANEOUS at 08:51

## 2022-12-07 RX ADMIN — SPIRONOLACTONE 150 MG: 50 TABLET ORAL at 12:25

## 2022-12-07 RX ADMIN — IBUPROFEN 400 MG: 400 TABLET ORAL at 10:39

## 2022-12-07 RX ADMIN — OXYCODONE HYDROCHLORIDE 5 MG: 5 TABLET ORAL at 04:19

## 2022-12-07 RX ADMIN — Medication 1 TABLET: at 08:50

## 2022-12-07 RX ADMIN — FUROSEMIDE 60 MG: 40 TABLET ORAL at 08:50

## 2022-12-07 RX ADMIN — IBUPROFEN 400 MG: 400 TABLET ORAL at 04:20

## 2022-12-07 RX ADMIN — OXYCODONE HYDROCHLORIDE 5 MG: 5 TABLET ORAL at 10:39

## 2022-12-07 RX ADMIN — BISACODYL 10 MG: 5 TABLET, COATED ORAL at 04:16

## 2022-12-07 RX ADMIN — POLYETHYLENE GLYCOL (3350) 17 G: 17 POWDER, FOR SOLUTION ORAL at 04:15

## 2022-12-07 RX ADMIN — LACTULOSE 30 G: 10 SOLUTION ORAL at 08:50

## 2022-12-07 ASSESSMENT — PAIN DESCRIPTION - ORIENTATION
ORIENTATION: LEFT

## 2022-12-07 ASSESSMENT — PAIN DESCRIPTION - DESCRIPTORS
DESCRIPTORS: ACHING;SHARP
DESCRIPTORS: ACHING;DISCOMFORT
DESCRIPTORS: ACHING;SHARP

## 2022-12-07 ASSESSMENT — PAIN DESCRIPTION - ONSET
ONSET: ON-GOING
ONSET: ON-GOING

## 2022-12-07 ASSESSMENT — PAIN DESCRIPTION - FREQUENCY
FREQUENCY: INTERMITTENT
FREQUENCY: INTERMITTENT

## 2022-12-07 ASSESSMENT — PAIN SCALES - GENERAL
PAINLEVEL_OUTOF10: 3
PAINLEVEL_OUTOF10: 3
PAINLEVEL_OUTOF10: 6

## 2022-12-07 ASSESSMENT — PAIN DESCRIPTION - PAIN TYPE
TYPE: SURGICAL PAIN
TYPE: SURGICAL PAIN

## 2022-12-07 ASSESSMENT — PAIN - FUNCTIONAL ASSESSMENT
PAIN_FUNCTIONAL_ASSESSMENT: PREVENTS OR INTERFERES SOME ACTIVE ACTIVITIES AND ADLS
PAIN_FUNCTIONAL_ASSESSMENT: ACTIVITIES ARE NOT PREVENTED
PAIN_FUNCTIONAL_ASSESSMENT: ACTIVITIES ARE NOT PREVENTED

## 2022-12-07 ASSESSMENT — PAIN DESCRIPTION - LOCATION
LOCATION: LEG

## 2022-12-07 ASSESSMENT — PAIN SCALES - WONG BAKER: WONGBAKER_NUMERICALRESPONSE: 0

## 2022-12-07 NOTE — PLAN OF CARE
Problem: Discharge Planning  Goal: Discharge to home or other facility with appropriate resources  12/7/2022 1233 by Kimberlyn Burgess LPN  Outcome: Completed  12/7/2022 0850 by Kimberlyn Burgess LPN  Outcome: Progressing     Problem: Skin/Tissue Integrity  Goal: Absence of new skin breakdown  Description: 1. Monitor for areas of redness and/or skin breakdown  2. Assess vascular access sites hourly  3. Every 4-6 hours minimum:  Change oxygen saturation probe site  4. Every 4-6 hours:  If on nasal continuous positive airway pressure, respiratory therapy assess nares and determine need for appliance change or resting period.   12/7/2022 1233 by Kimberlyn Burgess LPN  Outcome: Completed  12/7/2022 0850 by Kimberlyn Burgess LPN  Outcome: Progressing     Problem: Safety - Adult  Goal: Free from fall injury  12/7/2022 1233 by Kimberlyn Burgess LPN  Outcome: Completed  12/7/2022 0850 by Kimberlyn Burgess LPN  Outcome: Progressing     Problem: Pain  Goal: Verbalizes/displays adequate comfort level or baseline comfort level  12/7/2022 1233 by Kimberlyn Burgess LPN  Outcome: Completed  12/7/2022 0850 by Kimberlyn Burgess LPN  Outcome: Progressing     Problem: ABCDS Injury Assessment  Goal: Absence of physical injury  12/7/2022 1233 by Kimberlyn Burgess LPN  Outcome: Completed  12/7/2022 0850 by Kimberlyn Burgess LPN  Outcome: Progressing     Problem: Nutrition Deficit:  Goal: Optimize nutritional status  12/7/2022 1233 by Kimberlyn Burgess LPN  Outcome: Completed  12/7/2022 0850 by Kimberlyn Burgess LPN  Outcome: Progressing

## 2022-12-07 NOTE — PROGRESS NOTES
Damien Perez    : 1970  Acct #: [de-identified]  MRN: [id number]              PM&R Progress Note      Admitting diagnosis: Left lateral tibial plateau fracture ( [place] [facility])     Comorbid diagnoses impacting rehabilitation: Generalized weakness, gait disturbance, uncontrolled pain, alcoholic liver disease, cirrhosis, thrombocytopenia, alcoholic neuropathy    Chief complaint: Mild throbbing in the left lower leg when it is dependent. No new numbness, tingling or coughing. Prior (baseline) level of function: Independent. Current level of function:         Current  IRF-JUDE and Goals:   Occupational Therapy:    Short Term Goals  Time Frame for Short Term Goals: STGs=LTGs :   Long Term Goals  Time Frame for Long Term Goals : 7-10 days or until d/c. Long Term Goal 1: Pt will complete self feeding with Mod I.  Long Term Goal 2: Pt will complete oral hygiene and grooming tasks with Mod I.  Long Term Goal 3: Pt will complete total body bathing with AE PRN and Mod I.  Long Term Goal 4: Pt will complete UB dressing with IND. Long Term Goal 5: Pt will complete LB dressing with AE PRN and Mod I. Additional Goals?: Yes  Long Term Goal 6: Pt will doff/don footwear with AE PRN and Mod I.  Long Term Goal 7: Pt will perform toileting with Mod I.  Long Term Goal 8: Pt will perform functional transfers (bed, chair, toilet, shower) with DME PRN and Mod I.  Long Term Goal 9: Pt will perform therex/therax to facilitate an increase in strength/endurance (with emphasis on dynamic standing balance/tolerance > 8 mins) with Mod I.  Long Term Goal 10: Pt will perform meal prep with Mod I. :                                       Eating: Eating  Assistance Needed: Independent  Comment: X  CARE Score: 6  Discharge Goal: Independent       Oral Hygiene: Oral Hygiene  Assistance Needed: Independent  Comment:  Mod I seated sinkside  CARE Score: 6  Discharge Goal: Independent    UB/LB Bathing: Shower/Bathe Self  Assistance Needed: Independent  Comment: Mod I seated for entirety of shower, uses LHS to bathe distal BLE and feet, weight shifts to reach mague area and buttocks  CARE Score: 6  Discharge Goal: Independent    UB Dressing: Upper Body Dressing  Assistance Needed: Independent  Comment: X  CARE Score: 6  Discharge Goal: Independent         LB Dressing: Lower Body Dressing  Assistance Needed: Independent  Comment: Mod I requires intermittent  unilateral support for balance managing pants oiver hips, uses reacher to thread LLE into pants  CARE Score: 6  Discharge Goal: Independent    Donning and [place] Footwear: Putting On/Taking Off Footwear  Assistance Needed: Independent  Comment: Mod I uses reacher and sock aide to LLE, independently donns sock and shoe to RLE  CARE Score: 6  Discharge Goal: Independent      Toileting: Toileting Hygiene  Assistance needed: Independent  Comment: Mod I in stance, unilateral support to grab bar for balance managing pants  CARE Score: 6  Discharge Goal: Independent      Toilet Transfers: Toilet Transfer  Assistance needed: Independent  Comment:  Mod I  CARE Score: 6  Discharge Goal: Independent    Physical Therapy:   Short Term Goals  Time Frame for Short Term Goals: 7-10 days  Short Term Goal 1: pt to complete all bed mobility mod I  Short Term Goal 2: Pt to complete sit to stand to/from bed, commode, and car transfers with  mod I  Short Term Goal 3: Pt will ambulate 50' on level surfaces and 10' on unlevel surfaces with 2ww and NWB LLE with mod I, supervision for 150' on level surface  Short Term Goal 4: Pt will ascend/descend curb step with 2ww and 4 steps with rail with CGA  Short Term Goal 5: Pt will  object with reacher and 2ww with mod I  Additional Goals?: Yes  Short Term Goal 6: pt will propel mod I in w/c for >500'            Bed Mobility:   Sit to Lying  Assistance Needed: Independent  Comment: Ind in regular bed  CARE Score: 6  Discharge Goal: Independent  Roll Left and Right  Assistance Needed: Independent  Comment: Ind in regular bed  CARE Score: 6  Discharge Goal: Independent  Lying to Sitting on Side of Bed  Assistance Needed: Independent  Comment: Ind in regular bed  CARE Score: 6  Discharge Goal: Independent    Transfers:    Sit to Stand  Assistance Needed: Independent  Comment: Mod Ind with 2WW following NWB on LLE  CARE Score: 6  Discharge Goal: Independent  Chair/Bed-to-Chair Transfer  Assistance Needed: Independent  Comment: Mod Ind using squat pivot leading with RLE/LLE; pt Ind positions w/c and manages w/c parts appropriately and safely prior to bed<->w/c transfers  CARE Score: 6  Discharge Goal: Independent     Car Transfer  Assistance Needed: Independent  Comment: Mod Ind using 2WW following NWB on LLE  Reason if not Attempted: Not attempted due to medical condition or safety concerns  CARE Score: 6  Discharge Goal: Independent    Ambulation:    Walking Ability  Does the Patient Walk?: Yes     Walk 10 Feet  Assistance Needed: Independent  Comment: Mod Ind using 2WW following NWB on LLE  CARE Score: 6  Discharge Goal: Independent     Walk 50 Feet with Two Turns  Assistance Needed: Independent  Comment:  Mod Ind using 2WW following NWB on LLE  CARE Score: 6  Discharge Goal: Independent     Walk 150 Feet  Assistance Needed: Supervision or touching assistance  Comment: pt was able to complete this mobility task today with SBA-CGA using 2WW following NWB on LLE; visible tremors on upper body and RLE observed due to increased fatigue and worsening R foot pain (7/10) that affected pt's gait quality  Reason if not Attempted: Not attempted due to medical condition or safety concerns  CARE Score: 4  Discharge Goal: Supervision or touching assistance     Walking 10 Feet on Uneven Surfaces  Assistance Needed: Supervision or touching assistance  Comment: SBA-CGA using 2WW following NWB on LLE  Reason if not Attempted: Not attempted due to medical condition or safety concerns  CARE Score: 4  Discharge Goal: Independent     1 Step (Curb)  Assistance Needed: Dependent  Comment: Min A x 2 using 2WW to ascend/descend 4\" step height following NWB on LLE; required use of modified technique for safety (backward hopping to ascend) due to impaired strength, balance, and limited L knee flexion to fully clear over step  Reason if not Attempted: Not attempted due to medical condition or safety concerns  CARE Score: 1  Discharge Goal: Supervision or touching assistance     4 Steps  Comment: pt unable; high fall risk; safety concerns related to impaired strength, balance, and endurance with NWB on LLE even with 2-person assist  Reason if not Attempted: Not attempted due to medical condition or safety concerns  CARE Score: 88  Discharge Goal: Supervision or touching assistance     12 Steps  Comment: pt unable; high fall risk; safety concerns related to impaired strength, balance, and endurance with NWB on LLE even with 2-person assist  Reason if not Attempted: Not attempted due to medical condition or safety concerns  CARE Score: 88  Discharge Goal: Not Attempted       Wheelchair:  w/c Ability: Wheelchair Ability  Uses a Wheelchair and/or Scooter?: Yes  Wheel 50 Feet with Two Turns  Assistance Needed: Independent  Comment: Mod Ind using BUE  CARE Score: 6  Discharge Goal: Independent  Wheel 150 Feet  Assistance Needed: Independent  Comment: Mod Ind using BUE  CARE Score: 6  Discharge Goal: Independent          Balance:        Object: Picking Up Object  Assistance Needed: Independent  Comment: Mod Ind with 2WW and reacher following NWB on LLE throughout required tasks  CARE Score: 6  Discharge Goal: Independent    I      Exam:    Blood pressure 123/63, pulse 85, temperature 99.2 °F (37.3 °C), temperature source Oral, resp. rate 16, height 5' 5\" (1.651 m), weight 240 lb 15.4 oz (109.3 kg), SpO2 99 %. General: Sitting up in bed. Alert. Ice on his leg. HEENT: Gazing right left. Clear speech. Neck supple. MMM. Pulmonary: Unlabored and clear. Cardiac: Regular rate and rhythm. Abdomen: Patient's abdomen is soft and nondistended. Bowel sounds were present throughout. There was no rebound, guarding or masses noted. Upper extremities: No new bruising or swelling. Strong . Lower extremities: Left lower leg incision is clean and dry without erythema or tenderness. Weak left ankle dorsiflexion with some pain. Heel clear. Sitting balance was good. Standing balance was fair-.    Lab Results   Component Value Date    WBC 9.9 12/06/2022    HGB 12.8 (L) 12/06/2022    HCT 37.7 (L) 12/06/2022    MCV 97.4 12/06/2022     12/06/2022     Lab Results   Component Value Date    INR 1.15 11/27/2022    PROTIME 14.9 (H) 11/27/2022     Lab Results   Component Value Date    CREATININE 0.6 (L) 12/06/2022    BUN 16 12/06/2022     12/06/2022    K 4.5 12/06/2022    CL 99 12/06/2022    CO2 24 12/06/2022     No results found for: ALT, AST, GGT, ALKPHOS, BILITOT    Expected length of stay  prior to a supervised level of function for discharge home with a wheelchair/walker and [place] [address] OT/PT is 12/7/2022. Recommendations:    Left lateral tibial plateau fracture with gait disturbance: He is gaining confidence with using a walker and wheelchair with his self-care mobility. We will transition to an outpatient therapy program after discharge tomorrow. Continue maintaining no weightbearing through the left lower limb. Immobilizer on the left knee at all times. His incision shows no signs of infection. Less erythema today and a negative venous Doppler study yesterday. Ongoing aggressive pulmonary hygiene measures, nutritional support, pain management and DVT prophylaxis. Outpatient follow-up with orthopedics in 1 week. His left lower limb remains mildly swollen but it seems like what I would expect at this point. Verbal cues and SBA for transfers. .  DVT prophylaxis: Lovenox 30 mg SQ twice daily. His surgeon has committed him to a 6-week course. Planning on instruction for self administration of this medication. Monitoring his hemoglobin and platelet count periodically while on this medication. Periodically monitoring his thrombocytopenia. No new bruising or swelling. Uncontrolled pain: Off of injectable analgesics. Reasonable pain control. He has oxycodone available as well. Cryotherapy. Initially we have been avoiding acetaminophen products while struggling with cirrhosis. He reports occasional use of Tylenol as an outpatient at the direction of his gastroenterologist.  Tolerating ibuprofen. Alcoholic liver disease: Chronulac 10 g twice daily. Cautious use of Tylenol products. Cautious diuresis. Neuropathy: Holding off with gabapentin. Weightbearing through his right lower limb is much as possible.

## 2022-12-07 NOTE — CARE COORDINATION
Spoke with [facility] receptionist.  She checked the system and that patient has been accepted for [place] [address]. They will telephone him today.   Case mgt will fax AVS when available    Ortho f/u already scheduled for 12/14        ARU  Discharge Summary    D/C Date: 12/7/22    Patient discharged to: home alone    Transported by: mother    [facility]    Additional information:     Caregiver training: mother 12/6    Discharge BIMS completed:  [x]

## 2022-12-07 NOTE — PROGRESS NOTES
ARU Discharge Assessment    Transportation  \"In the past 6-12 months, has lack of transportation kept you from medical appointments, meetings, work, or from getting things needed for daily living? \"Check all that apply:  [] A.  Yes, it has kept me from medical appointments or from getting my medications  [] B.  Yes, it has kept me from non-medical meetings, appointments, work, or from getting things that I need  [x] C.  No  [] X. Patient unable to respond    Provision of Current Reconciled Medication List to Subsequent Provider at Discharge     [] No, current reconciled medication list not provided to the subsequent provider. [x] Yes, current reconciled medication list provided to the subsequent provider. YES if D/C to Acute hospital, SNF, home with home health  (**Select route of transmission below**)      [] [address]   [] [employer]  [] Verbal (e.g. in person, telephone, video conferencing)  [x] Paper-based (e.g. fax, copies, printouts)   [] Other Methods (e.g. texting, email, CDs)    Provision of Current Reconciled Medication List to Patient at Discharge  [] No, current reconciled medication list not provided to the patient, family and/or caregiver. NO If D/C to Acute hospital, SNF, home with home health   [x] Yes, current reconciled medication list provided to the patient, family and/or caregiver. YES if D/C home with Outpatient or no services   (**Select route of transmission below**)     [] Via Electronic Health Record (e.g., electronic access to patient portal)   [] [employer]  [] Verbal (e.g. in person, telephone, video conferencing)  [x] Paper-based (e.g. fax, copies, printouts)   [] Other Methods (e.g. texting, email, CDs)    Health Literacy  \"How often do you need to have someone help you when you read instructions, pamphlets, or other written material from your doctor or pharmacy? \"  []  0. Never  [x]  1. Rarely  []  2. Sometimes  []  3. Often  []  4. Always  []  8. Patient unable to respond    Signs and Symptoms of Delirium  A. Acute Onset Mental Status Change - Is there evidence of an acute change in mental status from the patient's baseline? [x] 0. No  [] 1. Yes    B. Inattention - Did the patient have difficulty focusing attention, for example being easily distractible or having difficulty keeping track of what was being said? [x]  0. Behavior not present  []  1. Behavior continuously present, does not fluctuate  []  2. Behavior present, fluctuates (comes and goes, changes in severity)    C. Disorganized thinking - Was the patient's thinking disorganized or incoherent (rambling or irrelevant conversation, unclear or illogical flow of ideas, or unpredictable switching from subject to subject)? [x]  0. Behavior not present  []  1. Behavior continuously present, does not fluctuate  []  2. Behavior present, fluctuates (comes and goes, changes in severity)    D. Altered level of consciousness - Did the patient have altered level of consciousness as indicated by any of the following criteria? Vigilant - startled easily to any sound or touch  Lethargic - repeatedly dozed off while being asked questions, but responded to voice or touch  Stuporous - very difficulty to arouse and keep aroused for the interview  Comatose - could not be aroused  [x]  0. Behavior not present  []  1. Behavior continuously present, does not fluctuate  []  2. Behavior present, fluctuates (comes and goes, changes in severity)    Mood    \"Over the last 2 weeks, have you been bothered by any of the following problems?\" 1. Symptom Presence    0 = No  1 = Yes  9 = No Response 2.  Symptom Frequency    0 = Never or 1 day  1 = 2-6 days (several days)  2 = 7-11 days (half or more of the days)  3 = 12-14 days (nearly every day)  **Leave blank if 'No Reponse'**      Enter scores in boxes    Column 1 Column 2   Little interest or pleasure in doing things   [] 0. No  [x] 1. Yes  [] 9. No Response [] 0. Never or one day  [] 1.  2-6 days (several days)  [] 2.  7-11 days (half or more of days)  [x] 3.  12-14 days (nearly every day)     Feeling down, depressed, or hopeless   [x] 0. No  [] 1. Yes  [] 9. No Response [x] 0. Never or one day  [] 1.  2-6 days (several days)  [] 2.  7-11 days (half or more of days)  [] 3.  12-14 days (nearly every day)   **If Question A or B in column 2 is coded 2 or 3, CONTINUE asking the questions below in box. If not, SKIP down to [facility]" question and continue**       Trouble falling or staying asleep, or sleeping too much   [] 0. No  [x] 1. Yes  [] 9. No Response [] 0. Never or one day  [] 1.  2-6 days (several days)  [x] 2.  7-11 days (half or more of days)  [] 3.  12-14 days (nearly every day   Feeling tired or having little energy   [] 0. No  [x] 1. Yes  [] 9. No Response [] 0. Never or one day  [] 1.  2-6 days (several days)  [x] 2.  7-11 days (half or more of days)  [] 3.  12-14 days (nearly every day   Poor appetite or overeating     [x] 0. No  [] 1. Yes  [] 9. No Response [x] 0. Never or one day  [] 1.  2-6 days (several days)  [] 2.  7-11 days (half or more of days)  [] 3.  12-14 days (nearly every day   Feeling bad about yourself - or that you are a failure or have let yourself or your family down   [x] 0. No  [] 1. Yes  [] 9. No Response [x] 0. Never or one day  [] 1.  2-6 days (several days)  [] 2.  7-11 days (half or more of days)  [] 3.  12-14 days (nearly every day   Trouble concentrating on things, such as reading the newspaper or watching television   [] 0. No  [x] 1. Yes  [] 9. No Response [] 0. Never or one day  [] 1.  2-6 days (several days)  [x] 2.  7-11 days (half or more of days)  [] 3.  12-14 days (nearly every day   Moving or speaking so slowly that other people could have noticed.   Or the opposite- being so fidgety or restless that you have been moving around a lot more than usual.   [x] 0. No  [] 1. Yes  [] 9. No Response [x] 0. Never or one day  [] 1.  2-6 days (several days)  [] 2.  7-11 days (half or more of days)  [] 3.  12-14 days (nearly every day   Thoughts that you would be better off dead, or of hurting yourself in some way. [x] 0. No  [] 1. Yes  [] 9. No Response [x] 0. Never or one day  [] 1.  2-6 days (several days)  [] 2.  7-11 days (half or more of days)  [] 3.  12-14 days (nearly every day     Social Isolation  \"How often do you feel lonely or isolated from those around you? \"  [] 0. Never  [] 1. Rarely  [x] 2. Sometimes  [] 3. Often  [] 4. Always  [] 8. Patient unable to respond    Pain Effect on Sleep  \"Over the past 5 days, how much of the time has pain made it hard for you to sleep at night? \"  []  0. Does not apply - I have not had any pain or hurting in the past 5 days  []  1. Rarely or not at all  []  2. Occasionally  []  3. Frequently  [x]  4. Almost constantly  []  8. Unable to answer  **If the patient answers \"0. Does not apply\" to this question, skip the next two \"Pain Effect. [place] \" questions**      Pain Interference with Therapy Activities  \"Over the past 5 days, how often have you limited your participation in rehabilitation therapy sessions due to pain? \"  []  1. Rarely or not at all  []  2. Occasionally  []  3. Frequently  [x]  4. Almost constantly  []  8. Unable to answer    Pain Interference with Day-to-Day Activities: \"Over the past 5 days, how often have you limited your day-to-day activities (excluding rehabilitation therapy session)? \"  []  1. Rarely or not at all  []  2. Occasionally  []  3. Frequently  [x]  4. Almost constantly  []  8. Unable to answer    Nutritional Approaches  Last 7 Days - Check all of the following nutritional approaches that were received within the last 7 days:  []  A. Parenteral/IV feeding  []  B. Feeding tube (e.g., nasogastric or abdominal (PEG))  []  C.   Mechanically altered diet - requires change in texture of food or liquids (e.g., pureed food, thickened liquids)  []  D. Therapeutic diet (e.g., low salt, diabetic, low cholesterol)  [x]  Z. None of the above    At time of Discharge - Check all of the following nutritional approaches that were being received at discharge:  []  A. Parenteral/IV feeding  []  B. Feeding tube (e.g., nasogastric or abdominal (PEG))  []  C. Mechanically altered diet - requires change in texture of food or liquids (e.g., pureed food, thickened liquids)  []  D. Therapeutic diet (e.g., low salt, diabetic, low cholesterol  [x]  Z. None of the above      Special Treatments, Procedures, and Programs    Check all of the following treatments, procedures, and programs that apply at discharge. At Discharge (check all that apply)   Cancer Treatments   A1. Chemotherapy []           A2. IV []           A3. Oral []           A10. Other []   B1. Radiation []   Respiratory Therapies   C1. Oxygen Therapy []           C2. Continuous []           C3. Intermittent []           C4. High-concentration []   D1. Suctioning []           D2. Scheduled []           D3. As needed []   E1. Tracheostomy Care []   F1. Invasive Mechanical Ventilator (ventilator or respirator) []   G1. Non-invasive Mechanical Ventilator []           G2. BiPAP []           G3. CPAP []   Other   H1. IV Medications []           H2. Vasoactive medications      *ensure via IV only []           H3. Antibiotics                *ensure via IV only []           H4. Anticoagulation               *ensure via IV only []           H10. Other []   I1. Transfusions []   J1. Dialysis []           J2. Hemodialysis []           J3. Peritoneal dialysis []   O1. IV access []           O2. Peripheral []           O3. Midline []           O4.  Central (PICC, tunneled, port) []      None of the above (select if no Cancer, Respiratory, or Other boxes are checked) [x]

## 2022-12-07 NOTE — DISCHARGE INSTRUCTIONS
Your information:  Name: Shawna Canada  : 1970    Your instructions: You have been referred to   [facility]  [address], [place]  [phone]  A representative will contact you directly to schedule your evaluation. Pt will discharge home with medical supplies from   [address] [place] [address]   [phone]  Wheelchair, rolling walker, tub transfer bench & grab bar    Your [address] [place] [address] insurance includes a transportation benefit. The benefit can be used for medical appointments, pharmacy trips, dental & eye appointments, and preordered grocery and food bank pickup. Most trips need to be scheduled 48 hours in advance. Same day sick visits are available. [phone]       What to do after you leave the hospital:    Recommended diet: {diet:66844}    Recommended activity: {discharge activity:86702}        The following personal items were collected during your admission and were returned to you:    Belongings  Dental Appliances: None  Vision - Corrective Lenses: Eyeglasses  Hearing Aid: None  Clothing: Belt, Footwear, Pants, Shirt, Socks, Undergarments, At bedside  Jewelry: None  Electronic Devices: Cell Phone, , Computer, At bedside  Weapons (Notify Protective Services/Security): None  Home Medications: None  Valuables Given To: Patient  Provide Name(s) of Who Valuable(s) Were Given To: pt. has items    Information obtained by:  By signing below, I understand that if any problems occur once I leave the hospital I am to contact ***. I understand and acknowledge receipt of the instructions indicated above.

## 2022-12-08 NOTE — CARE COORDINATION
Received a call from Monse Cruz with [facility]. She stated that the Auth #[id number] was for the acute care stay and not ARU. She is faxing a form that needs completed for patient's ARU authorization. Fax received, form was given to admissions coordinator to follow up on.

## 2022-12-12 NOTE — DISCHARGE SUMMARY
Patient Name: Shawna Canada  Patient :  1970  Patient MRN:   [id number]      Admission Date:  2022  Discharge Date: 2022    Admitting diagnosis: Left lateral tibial plateau fracture ( [address])     Comorbid diagnoses impacting rehabilitation: Generalized weakness, gait disturbance, uncontrolled pain, alcoholic liver disease, cirrhosis, thrombocytopenia, alcoholic neuropathy    Discharging diagnosis: Left lateral tibial plateau fracture ( [address] [address])     Comorbid diagnoses impacting rehabilitation: Generalized weakness, gait disturbance, uncontrolled pain, alcoholic liver disease, cirrhosis, thrombocytopenia, alcoholic neuropathy     History of present illness: The patient is a 71-year-old right-hand-dominant male who suffered a mechanical fall at home on 2022. He lost balance when carrying something in his garage. He landed awkwardly onto his left knee. He had immediate pain and disfigurement and was unable to walk well. He came to our ED where a minimally displaced left tibial plateau fracture was identified. On 2022 Dr. Gianna Ventura performed an open reduction internal fixation of the left lateral tibial plateau fracture. He restricted him to nonweightbearing and to use a knee immobilizer at all times. The patient had significant pain, positional dizziness and poor sleep. His appetite was poor at the start of the rehab admission. Prior (baseline) level of function: Independent. Current level of function:         Current  IRF-JUDE and Goals:   Occupational Therapy:    Short Term Goals  Time Frame for Short Term Goals: STGs=LTGs :   Long Term Goals  Time Frame for Long Term Goals : 7-10 days or until d/c.   Long Term Goal 1: Pt will complete self feeding with Mod I.  Long Term Goal 2: Pt will complete oral hygiene and grooming tasks with Mod I.  Long Term Goal 3: Pt will complete total body bathing with AE PRN and Mod I.  Long Term Goal 4: Pt will complete UB dressing with IND.  Long Term Goal 5: Pt will complete LB dressing with AE PRN and Mod I. Additional Goals?: Yes  Long Term Goal 6: Pt will doff/don footwear with AE PRN and Mod I.  Long Term Goal 7: Pt will perform toileting with Mod I.  Long Term Goal 8: Pt will perform functional transfers (bed, chair, toilet, shower) with DME PRN and Mod I.  Long Term Goal 9: Pt will perform therex/therax to facilitate an increase in strength/endurance (with emphasis on dynamic standing balance/tolerance > 8 mins) with Mod I.  Long Term Goal 10: Pt will perform meal prep with Mod I. :                                       Eating: Eating  Assistance Needed: Independent  Comment: X  CARE Score: 6  Discharge Goal: Independent       Oral Hygiene: Oral Hygiene  Assistance Needed: Independent  Comment: Mod I seated sinkside  CARE Score: 6  Discharge Goal: Independent    UB/LB Bathing: Shower/Bathe Self  Assistance Needed: Independent  Comment: Mod I seated for entirety of shower, uses LHS to bathe distal BLE and feet, weight shifts to reach mague area and buttocks  CARE Score: 6  Discharge Goal: Independent    UB Dressing: Upper Body Dressing  Assistance Needed: Independent  Comment: X  CARE Score: 6  Discharge Goal: Independent         LB Dressing: Lower Body Dressing  Assistance Needed: Independent  Comment: Mod I requires intermittent  unilateral support for balance managing pants oiver hips, uses reacher to thread LLE into pants  CARE Score: 6  Discharge Goal: Independent    Donning and [place] Footwear: Putting On/Taking Off Footwear  Assistance Needed: Independent  Comment: Mod I uses reacher and sock aide to LLE, independently donns sock and shoe to RLE  CARE Score: 6  Discharge Goal: Independent      Toileting: Toileting Hygiene  Assistance needed: Independent  Comment: Mod I in stance, unilateral support to grab bar for balance managing pants  CARE Score: 6  Discharge Goal: Independent      Toilet Transfers:   Toilet Transfer  Assistance needed: Independent  Comment: Mod I  CARE Score: 6  Discharge Goal: Independent    Physical Therapy:   Short Term Goals  Time Frame for Short Term Goals: 7-10 days  Short Term Goal 1: pt to complete all bed mobility mod I  Short Term Goal 2: Pt to complete sit to stand to/from bed, commode, and car transfers with  mod I  Short Term Goal 3: Pt will ambulate 50' on level surfaces and 10' on unlevel surfaces with 2ww and NWB LLE with mod I, supervision for 150' on level surface  Short Term Goal 4: Pt will ascend/descend curb step with 2ww and 4 steps with rail with CGA  Short Term Goal 5: Pt will  object with reacher and 2ww with mod I  Additional Goals?: Yes  Short Term Goal 6: pt will propel mod I in w/c for >500'            Bed Mobility:   Sit to Lying  Assistance Needed: Independent  Comment: Ind in regular bed  CARE Score: 6  Discharge Goal: Independent  Roll Left and Right  Assistance Needed: Independent  Comment: Ind in regular bed  CARE Score: 6  Discharge Goal: Independent  Lying to Sitting on Side of Bed  Assistance Needed: Independent  Comment: Ind in regular bed  CARE Score: 6  Discharge Goal: Independent    Transfers:    Sit to Stand  Assistance Needed: Independent  Comment: Mod Ind with 2WW following NWB on LLE  CARE Score: 6  Discharge Goal: Independent  Chair/Bed-to-Chair Transfer  Assistance Needed: Independent  Comment: Mod Ind using squat pivot leading with RLE/LLE; pt Ind positions w/c and manages w/c parts appropriately and safely prior to bed<->w/c transfers  CARE Score: 6  Discharge Goal: Independent     Car Transfer  Assistance Needed: Independent  Comment: Mod Ind using 2WW following NWB on LLE  Reason if not Attempted: Not attempted due to medical condition or safety concerns  CARE Score: 6  Discharge Goal: Independent    Ambulation:    Walking Ability  Does the Patient Walk?: Yes     Walk 10 Feet  Assistance Needed: Independent  Comment:  Mod Ind using 2WW following NWB on LLE  CARE Score: 6  Discharge Goal: Independent     Walk 50 Feet with Two Turns  Assistance Needed: Independent  Comment:  Mod Ind using 2WW following NWB on LLE  CARE Score: 6  Discharge Goal: Independent     Walk 150 Feet  Assistance Needed: Supervision or touching assistance  Comment: pt was able to complete this mobility task today with SBA-CGA using 2WW following NWB on LLE; visible tremors on upper body and RLE observed due to increased fatigue and worsening R foot pain (7/10) that affected pt's gait quality  Reason if not Attempted: Not attempted due to medical condition or safety concerns  CARE Score: 4  Discharge Goal: Supervision or touching assistance     Walking 10 Feet on Uneven Surfaces  Assistance Needed: Supervision or touching assistance  Comment: SBA-CGA using 2WW following NWB on LLE  Reason if not Attempted: Not attempted due to medical condition or safety concerns  CARE Score: 4  Discharge Goal: Independent     1 Step (Curb)  Assistance Needed: Dependent  Comment: Min A x 2 using 2WW to ascend/descend 4\" step height following NWB on LLE; required use of modified technique for safety (backward hopping to ascend) due to impaired strength, balance, and limited L knee flexion to fully clear over step  Reason if not Attempted: Not attempted due to medical condition or safety concerns  CARE Score: 1  Discharge Goal: Supervision or touching assistance     4 Steps  Comment: pt unable; high fall risk; safety concerns related to impaired strength, balance, and endurance with NWB on LLE even with 2-person assist  Reason if not Attempted: Not attempted due to medical condition or safety concerns  CARE Score: 88  Discharge Goal: Supervision or touching assistance     12 Steps  Comment: pt unable; high fall risk; safety concerns related to impaired strength, balance, and endurance with NWB on LLE even with 2-person assist  Reason if not Attempted: Not attempted due to medical condition or safety concerns  CARE Score: 88  Discharge Goal: Not Attempted       Wheelchair:  w/c Ability: Wheelchair Ability  Uses a Wheelchair and/or Scooter?: Yes  Wheel 50 Feet with Two Turns  Assistance Needed: Independent  Comment: Mod Ind using BUE  CARE Score: 6  Discharge Goal: Independent  Wheel 150 Feet  Assistance Needed: Independent  Comment: Mod Ind using BUE  CARE Score: 6  Discharge Goal: Independent          Balance:        Object: Picking Up Object  Assistance Needed: Independent  Comment: Mod Ind with 2WW and reacher following NWB on LLE throughout required tasks  CARE Score: 6  Discharge Goal: Independent    I      Exam:    Blood pressure (!) 105/59, pulse 92, temperature 97.9 °F (36.6 °C), temperature source Oral, resp. rate 18, height 5' 5\" (1.651 m), weight 246 lb 4.1 oz (111.7 kg), SpO2 100 %. General: Up in wheelchair. Alert and oriented. Fair insight. HEENT: Conjugate eye movements. Clear speech. Neck supple. MMM. Pulmonary: Symmetric air exchange without wheezes or rales. Cardiac: Regular rate and rhythm. Abdomen: Patient's abdomen is soft and nondistended. Bowel sounds were present throughout. There was no rebound, guarding or masses noted. Upper extremities: No new bruising or swelling. Strong . Lower extremities: Left lower leg incision is clean and dry without erythema or tenderness. Weak left ankle dorsiflexion with some pain. Heel clear. Sitting balance was good.   Standing balance was fair-.    Lab Results   Component Value Date    WBC 9.9 12/06/2022    HGB 12.8 (L) 12/06/2022    HCT 37.7 (L) 12/06/2022    MCV 97.4 12/06/2022     12/06/2022     Lab Results   Component Value Date    INR 1.15 11/27/2022    PROTIME 14.9 (H) 11/27/2022     Lab Results   Component Value Date    CREATININE 0.6 (L) 12/06/2022    BUN 16 12/06/2022     12/06/2022    K 4.5 12/06/2022    CL 99 12/06/2022    CO2 24 12/06/2022     No results found for: ALT, AST, GGT, ALKPHOS, BILITOT      The patient presented to the ARU with the above history requiring a multidisciplinary treatment plan including close medical supervision by the [facility] B[facility] Director. The patient participated in the prescribed therapy treatment plan with reasonable compliance and progressive tolerance. They avoided significant medical complications. By the time of discharge the patient had become safer with adaptive equipment to transfer and toilet with a FWW with the supervision of family/caregivers. The patient was tolerating an oral diet without choking/coughing and was back to their baseline with regards to bowel and bladder control. Discharge instructions were reviewed with the patient. The patient is to follow up with the orthopedist and his PCP in 1 week. No driving. [facility] PT/OT/RN will be arranged. High Risk Drug Classes:  Use and Indication    Is taking: Check if the patient is taking any medications (or has them prescribed) by pharmacological classification, not how it is used, in the following classes  Indication noted: If column 1 is checked, check if there is an indication noted for all meds in the drug class Is taking  (check all that apply) Indication noted (check all that apply)   Antipsychotic [] []   Anticoagulant [x] [x]   Antibiotic [] []   Opioid [x] [x]   Antiplatelet [] []   Hypoglycemic (including insulin) [] []   None of the above []        Medication List        START taking these medications      bisacodyl 5 MG EC tablet  Commonly known as: DULCOLAX  Take 2 tablets by mouth daily as needed for Constipation     enoxaparin 40 MG/0.4ML  Commonly known as: LOVENOX  Inject 0.4 mLs into the skin daily for 14 days     ibuprofen 400 MG tablet  Commonly known as: ADVIL;MOTRIN  Take 1 tablet by mouth every 6 hours as needed for Pain     oxyCODONE 5 MG immediate release tablet  Commonly known as: ROXICODONE  Take 1 tablet by mouth every 6 hours as needed for Pain for up to 7 days.      polyethylene glycol 17 g packet  Commonly known as: GLYCOLAX  Take 17 g by mouth daily as needed for Constipation     therapeutic multivitamin-minerals tablet  Take 1 tablet by mouth daily            CONTINUE taking these medications      albuterol sulfate  (90 Base) MCG/ACT inhaler  Commonly known as: PROVENTIL;VENTOLIN;PROAIR     furosemide 40 MG tablet  Commonly known as: LASIX     lactulose 10 GM/15ML solution  Commonly known as: CHRONULAC     spironolactone 100 MG tablet  Commonly known as: ALDACTONE               Where to Get Your Medications        You can get these medications from any pharmacy    Bring a paper prescription for each of these medications  enoxaparin 40 MG/0.4ML  oxyCODONE 5 MG immediate release tablet  You don't need a prescription for these medications  bisacodyl 5 MG EC tablet  ibuprofen 400 MG tablet  polyethylene glycol 17 g packet  therapeutic multivitamin-minerals tablet           CONDITION ON DISCHARGE: Stable. The prognosis is fair for further improvements in ADL's and safety with adapted gait/transfers. Record review, patient exam, discharge instructions, medication reconciliation and summary for this discharge visit took more than 30 minutes.

## 2022-12-14 ENCOUNTER — OFFICE VISIT (OUTPATIENT)
Dept: ORTHOPEDIC SURGERY | Age: 52
End: 2022-12-14

## 2022-12-14 VITALS
WEIGHT: 246.6 LBS | RESPIRATION RATE: 16 BRPM | OXYGEN SATURATION: 97 % | HEIGHT: 65 IN | BODY MASS INDEX: 41.09 KG/M2 | TEMPERATURE: 98.8 F | HEART RATE: 83 BPM

## 2022-12-14 DIAGNOSIS — Z98.890 S/P ORIF (OPEN REDUCTION INTERNAL FIXATION) FRACTURE: Primary | ICD-10-CM

## 2022-12-14 DIAGNOSIS — Z87.81 S/P ORIF (OPEN REDUCTION INTERNAL FIXATION) FRACTURE: Primary | ICD-10-CM

## 2022-12-14 PROCEDURE — 99024 POSTOP FOLLOW-UP VISIT: CPT | Performed by: PHYSICIAN ASSISTANT

## 2022-12-14 NOTE — PROGRESS NOTES
Date of surgery:   11/28/2022  Surgeon: Dr. Marylene Cheadle    History:  Mr. Amos Mccollum is here in follow up regarding his left tibial plateau fracture open reduction internal fixation. He states that he has maintained a nonweightbearing status or touchdown weightbearing status on the left leg. He states that he is really not having that much pain. He states that he stopped taking the narcotic pain medication because it was constipating him. He has continue taking the Lovenox shots to help prevent DVTs. Physical:  Vitals:    12/14/22 1106   Pulse: 83   Resp: 16   Temp: 98.8 °F (37.1 °C)   SpO2: 97%   Weight: 246 lb 9.6 oz (111.9 kg)   Height: 5' 5\" (1.651 m)     Left knee:  Surgical incision over the lateral aspect of the left proximal tibia is well-healed with no erythema, edema, or ecchymosis present. Range of motion 0-100 degrees    Imaging studies:  2 views of the left knee taken and reviewed in the office today show interval fixation of a mildly depressed left lateral tibial plateau fracture with fracture in good alignment and no complications of hardware.   The official read and interpretation of these x-rays will be done by the the [facility]       Impression: Status post left tibial plateau fracture-ORIF    Plan:   Patient Instructions   Remain non-weightbearing  Range of motion as tolerated  May take Ibuprofen or Motrin as needed  Rest, ice, and elevate as needed  Follow up in 4 weeks

## 2022-12-14 NOTE — LETTER
[facility]  [address]  Phone: [phone]  Fax: [phone]    Cesar Ventura         December 14, 2022     Patient: Ivy العلي   YOB: 1970   Date of Visit: 12/14/2022       To Whom It May Concern: It is my medical opinion that Ivy العلي requires a disability parking placard for the following reasons:  He has limited walking ability due to an orthopedic condition. Duration of need: 6 months    If you have any questions or concerns, please don't hesitate to call.     Sincerely,        Jane Solorzano PA-C

## 2022-12-27 ENCOUNTER — OFFICE (OUTPATIENT)
Dept: URBAN - METROPOLITAN AREA CLINIC 18 | Facility: CLINIC | Age: 52
End: 2022-12-27
Payer: COMMERCIAL

## 2022-12-27 VITALS — DIASTOLIC BLOOD PRESSURE: 70 MMHG | SYSTOLIC BLOOD PRESSURE: 124 MMHG | HEIGHT: 64 IN

## 2022-12-27 DIAGNOSIS — D64.9 ANEMIA, UNSPECIFIED: ICD-10-CM

## 2022-12-27 DIAGNOSIS — G93.40 ENCEPHALOPATHY, UNSPECIFIED: ICD-10-CM

## 2022-12-27 DIAGNOSIS — E66.3 OVERWEIGHT: ICD-10-CM

## 2022-12-27 DIAGNOSIS — K70.31 ALCOHOLIC CIRRHOSIS OF LIVER WITH ASCITES: ICD-10-CM

## 2022-12-27 PROCEDURE — 99213 OFFICE O/P EST LOW 20 MIN: CPT | Mod: SA | Performed by: NURSE PRACTITIONER

## 2023-01-16 ENCOUNTER — OFFICE VISIT (OUTPATIENT)
Dept: ORTHOPEDIC SURGERY | Age: 53
End: 2023-01-16

## 2023-01-16 VITALS — BODY MASS INDEX: 40.98 KG/M2 | HEIGHT: 65 IN | WEIGHT: 246 LBS | RESPIRATION RATE: 15 BRPM

## 2023-01-16 DIAGNOSIS — Z98.890 S/P ORIF (OPEN REDUCTION INTERNAL FIXATION) FRACTURE: Primary | ICD-10-CM

## 2023-01-16 DIAGNOSIS — Z87.81 S/P ORIF (OPEN REDUCTION INTERNAL FIXATION) FRACTURE: Primary | ICD-10-CM

## 2023-01-16 PROCEDURE — 99024 POSTOP FOLLOW-UP VISIT: CPT | Performed by: ORTHOPAEDIC SURGERY

## 2023-01-16 RX ORDER — FERROUS SULFATE 325(65) MG
325 TABLET ORAL
COMMUNITY

## 2023-01-16 ASSESSMENT — ENCOUNTER SYMPTOMS
COLOR CHANGE: 0
EYE REDNESS: 0
ABDOMINAL PAIN: 0
CHEST TIGHTNESS: 0
BACK PAIN: 0

## 2023-01-16 NOTE — PROGRESS NOTES
Subjective:      Patient ID: Alex Jacobsen is a 47 y.o. male. Patient returns to the office today for FU of the left Tibia DOS 11/28/22 Pt states pain today is a 1/10 he will notice increase pain when pushing with his foot. Pt states most of his pain is in the left knee. He will use ice and tylenol as needed. Pt has remained nonweightbearing since the surgery. He comes in today for his 6-week postop recheck. Overall he states that he is feeling great and is not having any pain in the left knee. He has almost full range of motion in the left knee but he has remained nonweightbearing on the left foot. Patient denies any new injury to the involved extremity/ joint, denies numbness or tingling in the involved extremity and denies fever or chills. Review of Systems   Constitutional:  Negative for activity change, chills and fever. HENT:  Negative for congestion and drooling. Eyes:  Negative for redness. Respiratory:  Negative for chest tightness. Cardiovascular:  Negative for chest pain. Gastrointestinal:  Negative for abdominal pain. Endocrine: Negative for cold intolerance and heat intolerance. Musculoskeletal:  Positive for gait problem. Negative for arthralgias, back pain, joint swelling and myalgias. Skin:  Negative for color change, pallor, rash and wound. Neurological:  Negative for weakness and numbness. Psychiatric/Behavioral:  Negative for confusion. Past Medical History:   Diagnosis Date    Alcoholic cirrhosis of liver (HCC)     Esophageal varices ([facility] [address]     Neuropathy        Objective:   Physical Exam  Constitutional:       Appearance: He is well-developed. HENT:      Head: Normocephalic. Nose: No congestion. Eyes:      Pupils: Pupils are equal, round, and reactive to light. Pulmonary:      Effort: Pulmonary effort is normal.   Musculoskeletal:         General: No tenderness or deformity. Normal range of motion. Cervical back: Normal range of motion. Right hip: Normal.      Left hip: Normal.      Right knee: No swelling, deformity, effusion, erythema, ecchymosis, lacerations or bony tenderness. Normal range of motion. No tenderness. No medial joint line, lateral joint line, MCL, LCL or patellar tendon tenderness. No LCL laxity or MCL laxity. Normal alignment and normal patellar mobility. Left knee: Normal. No swelling, deformity, effusion, erythema, ecchymosis, lacerations or bony tenderness. Normal range of motion. No tenderness. No medial joint line, lateral joint line, MCL, LCL or patellar tendon tenderness. No LCL laxity or MCL laxity. Normal alignment and normal patellar mobility. Skin:     General: Skin is warm and dry. Capillary Refill: Capillary refill takes less than 2 seconds. Coloration: Skin is not pale. Findings: No erythema or rash. Neurological:      Mental Status: He is alert and oriented to person, place, and time. Sensory: No sensory deficit. Motor: No weakness. Left knee-Incision clean, dry, intact, with no erythema, no drainage, and no signs of infection. 0-140    XR KNEE LEFT (1-2 VIEWS)    Result Date: 1/16/2023  XRAY X-ray 2 views of the left knee obtained and reviewed by me today in the office demonstrates age appropriate bone density throughout with intact plate and screw construct across the lateral aspect of the lateral tibial plateau fracture which remains in near anatomic alignment, there has been no new displacement or angulation compared to prior x-rays. Impression: Stable left lateral tibial plateau fracture status post ORIF with routine healing. Assessment:      Left lateral tibial plateau fracture ORIF, 6 weeks      Plan:      I discussed with him today his x-ray findings. I explained to him that his implants are stable, his fracture remains in good alignment and is healing very well. At this point he can begin 50% weightbearing on the left leg as tolerated.   Continue range of motion exercises as instructed. Ice and elevate as needed. Tylenol or Motrin for pain. Follow up in 6 weeks for recheck with x-rays of the left knee at which point we will progress to full weightbearing.             Mamadou Melgar, DO

## 2023-01-16 NOTE — PATIENT INSTRUCTIONS
May be 50% weightbearing on the left leg  Recommend using crutches   Continue range of motion exercises as instructed. Ice and elevate as needed. Tylenol or Motrin for pain.    Follow up in 6 weeks

## 2023-01-16 NOTE — PROGRESS NOTES
Patient returns to the office today for FU of the left Tibia DOS 11/28/22 Pt states pain today is a 1/10 he will notice increase pain when pushing with his foot. Pt states most of his pain is in the left knee. He will use ice and tylenol as needed. Pt has remained nonweightbearing since the surgery.

## 2023-01-30 DIAGNOSIS — Z87.81 S/P ORIF (OPEN REDUCTION INTERNAL FIXATION) FRACTURE: Primary | ICD-10-CM

## 2023-01-30 DIAGNOSIS — S82.142A TIBIAL PLATEAU FRACTURE, LEFT, CLOSED, INITIAL ENCOUNTER: ICD-10-CM

## 2023-01-30 DIAGNOSIS — M25.562 LEFT KNEE PAIN, UNSPECIFIED CHRONICITY: ICD-10-CM

## 2023-01-30 DIAGNOSIS — Z98.890 S/P ORIF (OPEN REDUCTION INTERNAL FIXATION) FRACTURE: Primary | ICD-10-CM

## 2023-02-27 ENCOUNTER — OFFICE VISIT (OUTPATIENT)
Dept: ORTHOPEDIC SURGERY | Age: 53
End: 2023-02-27

## 2023-02-27 VITALS — OXYGEN SATURATION: 96 % | RESPIRATION RATE: 16 BRPM | BODY MASS INDEX: 40.94 KG/M2 | HEART RATE: 94 BPM | HEIGHT: 65 IN

## 2023-02-27 DIAGNOSIS — Z87.81 S/P ORIF (OPEN REDUCTION INTERNAL FIXATION) FRACTURE: Primary | ICD-10-CM

## 2023-02-27 DIAGNOSIS — Z98.890 S/P ORIF (OPEN REDUCTION INTERNAL FIXATION) FRACTURE: Primary | ICD-10-CM

## 2023-02-27 PROCEDURE — 99024 POSTOP FOLLOW-UP VISIT: CPT | Performed by: ORTHOPAEDIC SURGERY

## 2023-02-27 ASSESSMENT — ENCOUNTER SYMPTOMS
COLOR CHANGE: 0
ABDOMINAL PAIN: 0
EYE REDNESS: 0
BACK PAIN: 0
CHEST TIGHTNESS: 0

## 2023-02-27 NOTE — PROGRESS NOTES
Subjective:      Patient ID: Chong Kelley is a 47 y.o. male. Patient seen in office today for post op ORIF LT Tibia DOS 11/28/22. Using FWW for ambulation into office. 0/10 pain level today. Stated not having much pain. Is still 50% WB at this time. Stated he's having some numbness in lower half of L LE with itching. He comes in today for his 3-month postop recheck. Overall he states that he is feeling great and is not having any pain in the left knee. He states that he has been placing 50% of his weight on his left leg and has been strengthening his left leg with physical therapy at the gym. Patient denies any new injury to the involved extremity/ joint, denies numbness or tingling in the involved extremity and denies fever or chills. He would like a knee brace to help him continue with his recovery. Review of Systems   Constitutional:  Negative for activity change, chills and fever. HENT:  Negative for congestion and drooling. Eyes:  Negative for redness. Respiratory:  Negative for chest tightness. Cardiovascular:  Negative for chest pain. Gastrointestinal:  Negative for abdominal pain. Endocrine: Negative for cold intolerance and heat intolerance. Musculoskeletal:  Positive for gait problem. Negative for arthralgias, back pain, joint swelling and myalgias. Skin:  Negative for color change, pallor, rash and wound. Neurological:  Negative for weakness and numbness. Psychiatric/Behavioral:  Negative for confusion. Past Medical History:   Diagnosis Date    Alcoholic cirrhosis of liver (HCC)     Esophageal varices (HCC)     Neuropathy        Objective:   Physical Exam  Constitutional:       Appearance: He is well-developed. HENT:      Head: Normocephalic. Nose: No congestion. Eyes:      Pupils: Pupils are equal, round, and reactive to light. Pulmonary:      Effort: Pulmonary effort is normal.   Musculoskeletal:         General: No tenderness or deformity. Normal range of motion. Cervical back: Normal range of motion. Right hip: Normal.      Left hip: Normal.      Right knee: No swelling, deformity, effusion, erythema, ecchymosis, lacerations or bony tenderness. Normal range of motion. No tenderness. No medial joint line, lateral joint line, MCL, LCL or patellar tendon tenderness. No LCL laxity or MCL laxity. Normal alignment and normal patellar mobility. Left knee: Normal. No swelling, deformity, effusion, erythema, ecchymosis, lacerations or bony tenderness. Normal range of motion. No tenderness. No medial joint line, lateral joint line, MCL, LCL or patellar tendon tenderness. No LCL laxity or MCL laxity. Normal alignment and normal patellar mobility. Skin:     General: Skin is warm and dry. Capillary Refill: Capillary refill takes less than 2 seconds. Coloration: Skin is not pale. Findings: No erythema or rash. Neurological:      Mental Status: He is alert and oriented to person, place, and time. Sensory: No sensory deficit. Motor: No weakness. Left knee-Incision clean, dry, intact, with no erythema, no drainage, and no signs of infection. 0-140    XR KNEE LEFT (1-2 VIEWS)    Result Date: 2/27/2023  XRAY X-ray 2 views of the left knee obtained and reviewed by me today in the office demonstrates age appropriate bone density throughout with intact plate and screw construct across the lateral tibial plateau fracture remains in near anatomic alignment with normal interval callus formation, there has been no new displacement or angulation compared to prior x-rays. Impression: Stable left lateral tibial plateau fracture status post ORIF with routine healing. Assessment:      Left lateral tibial plateau fracture ORIF, 3 months       Plan:      I discussed with him today his x-ray findings. I explained to him that his implants are stable, his fracture remains in good alignment and is continuing to heal very well.   I discussed with him today that at this point he can begin full weightbearing as tolerated on the left leg. He can resume all activities with no restrictions. I also placed him into a short hinged knee brace to be worn as needed for stability during activities. Continue weight-bearing as tolerated. Continue range of motion exercises as instructed. Ice and elevate as needed. Tylenol or Motrin for pain. Follow up will be as needed.               Mamadou 97, DO

## 2023-02-27 NOTE — PATIENT INSTRUCTIONS
May start full weight-bearing  Continue range of motion exercises as instructed. Ice and elevate as needed. Tylenol or Motrin for pain. Short Hinged Knee brace given. Follow up as needed.

## 2023-02-27 NOTE — PROGRESS NOTES
Patient seen in office today for post op ORIF LT Tibia DOS 11/28/22. Using FWW for ambulation into office. 0/10 pain level today. Stated not having much pain. Is still 50% WB at this time. Stated he's having some numbness in lower half of L LE with itching.

## 2023-03-24 ENCOUNTER — OFFICE (OUTPATIENT)
Dept: URBAN - METROPOLITAN AREA PATHOLOGY 1 | Facility: PATHOLOGY | Age: 53
End: 2023-03-24
Payer: COMMERCIAL

## 2023-03-24 ENCOUNTER — AMBULATORY SURGICAL CENTER (OUTPATIENT)
Dept: URBAN - METROPOLITAN AREA SURGERY 7 | Facility: SURGERY | Age: 53
End: 2023-03-24
Payer: COMMERCIAL

## 2023-03-24 VITALS
DIASTOLIC BLOOD PRESSURE: 46 MMHG | HEART RATE: 77 BPM | HEART RATE: 88 BPM | SYSTOLIC BLOOD PRESSURE: 88 MMHG | SYSTOLIC BLOOD PRESSURE: 108 MMHG | SYSTOLIC BLOOD PRESSURE: 97 MMHG | HEART RATE: 75 BPM | SYSTOLIC BLOOD PRESSURE: 88 MMHG | SYSTOLIC BLOOD PRESSURE: 99 MMHG | DIASTOLIC BLOOD PRESSURE: 34 MMHG | HEART RATE: 80 BPM | HEART RATE: 65 BPM | SYSTOLIC BLOOD PRESSURE: 99 MMHG | SYSTOLIC BLOOD PRESSURE: 105 MMHG | HEART RATE: 80 BPM | OXYGEN SATURATION: 98 % | OXYGEN SATURATION: 97 % | DIASTOLIC BLOOD PRESSURE: 54 MMHG | RESPIRATION RATE: 16 BRPM | SYSTOLIC BLOOD PRESSURE: 108 MMHG | HEART RATE: 77 BPM | SYSTOLIC BLOOD PRESSURE: 96 MMHG | SYSTOLIC BLOOD PRESSURE: 111 MMHG | OXYGEN SATURATION: 91 % | OXYGEN SATURATION: 93 % | DIASTOLIC BLOOD PRESSURE: 59 MMHG | SYSTOLIC BLOOD PRESSURE: 105 MMHG | RESPIRATION RATE: 29 BRPM | DIASTOLIC BLOOD PRESSURE: 52 MMHG | RESPIRATION RATE: 24 BRPM | HEART RATE: 88 BPM | DIASTOLIC BLOOD PRESSURE: 34 MMHG | DIASTOLIC BLOOD PRESSURE: 62 MMHG | OXYGEN SATURATION: 96 % | DIASTOLIC BLOOD PRESSURE: 50 MMHG | WEIGHT: 225 LBS | HEART RATE: 81 BPM | RESPIRATION RATE: 26 BRPM | DIASTOLIC BLOOD PRESSURE: 50 MMHG | HEART RATE: 81 BPM | DIASTOLIC BLOOD PRESSURE: 66 MMHG | DIASTOLIC BLOOD PRESSURE: 51 MMHG | SYSTOLIC BLOOD PRESSURE: 112 MMHG | OXYGEN SATURATION: 92 % | RESPIRATION RATE: 27 BRPM | SYSTOLIC BLOOD PRESSURE: 94 MMHG | DIASTOLIC BLOOD PRESSURE: 56 MMHG | SYSTOLIC BLOOD PRESSURE: 91 MMHG | RESPIRATION RATE: 28 BRPM | OXYGEN SATURATION: 90 % | HEART RATE: 76 BPM | TEMPERATURE: 97.2 F | RESPIRATION RATE: 27 BRPM | DIASTOLIC BLOOD PRESSURE: 62 MMHG | SYSTOLIC BLOOD PRESSURE: 98 MMHG | SYSTOLIC BLOOD PRESSURE: 95 MMHG | RESPIRATION RATE: 29 BRPM | HEART RATE: 65 BPM | RESPIRATION RATE: 24 BRPM | DIASTOLIC BLOOD PRESSURE: 51 MMHG | RESPIRATION RATE: 25 BRPM | SYSTOLIC BLOOD PRESSURE: 91 MMHG | SYSTOLIC BLOOD PRESSURE: 96 MMHG | RESPIRATION RATE: 25 BRPM | HEART RATE: 76 BPM | SYSTOLIC BLOOD PRESSURE: 112 MMHG | DIASTOLIC BLOOD PRESSURE: 54 MMHG | DIASTOLIC BLOOD PRESSURE: 64 MMHG | DIASTOLIC BLOOD PRESSURE: 59 MMHG | HEART RATE: 75 BPM | SYSTOLIC BLOOD PRESSURE: 97 MMHG | DIASTOLIC BLOOD PRESSURE: 53 MMHG | DIASTOLIC BLOOD PRESSURE: 64 MMHG | RESPIRATION RATE: 16 BRPM | OXYGEN SATURATION: 96 % | SYSTOLIC BLOOD PRESSURE: 95 MMHG | SYSTOLIC BLOOD PRESSURE: 111 MMHG | SYSTOLIC BLOOD PRESSURE: 94 MMHG | OXYGEN SATURATION: 91 % | DIASTOLIC BLOOD PRESSURE: 53 MMHG | DIASTOLIC BLOOD PRESSURE: 46 MMHG | TEMPERATURE: 97.2 F | DIASTOLIC BLOOD PRESSURE: 66 MMHG | WEIGHT: 225 LBS | OXYGEN SATURATION: 92 % | SYSTOLIC BLOOD PRESSURE: 98 MMHG | OXYGEN SATURATION: 97 % | OXYGEN SATURATION: 93 % | RESPIRATION RATE: 28 BRPM | RESPIRATION RATE: 22 BRPM | HEIGHT: 65 IN | OXYGEN SATURATION: 98 % | OXYGEN SATURATION: 90 % | OXYGEN SATURATION: 94 % | OXYGEN SATURATION: 94 % | RESPIRATION RATE: 22 BRPM | RESPIRATION RATE: 26 BRPM | HEIGHT: 65 IN | DIASTOLIC BLOOD PRESSURE: 56 MMHG | DIASTOLIC BLOOD PRESSURE: 52 MMHG

## 2023-03-24 DIAGNOSIS — D12.2 BENIGN NEOPLASM OF ASCENDING COLON: ICD-10-CM

## 2023-03-24 DIAGNOSIS — K31.89 OTHER DISEASES OF STOMACH AND DUODENUM: ICD-10-CM

## 2023-03-24 DIAGNOSIS — D12.0 BENIGN NEOPLASM OF CECUM: ICD-10-CM

## 2023-03-24 DIAGNOSIS — D12.4 BENIGN NEOPLASM OF DESCENDING COLON: ICD-10-CM

## 2023-03-24 DIAGNOSIS — D64.9 ANEMIA, UNSPECIFIED: ICD-10-CM

## 2023-03-24 DIAGNOSIS — K29.60 OTHER GASTRITIS WITHOUT BLEEDING: ICD-10-CM

## 2023-03-24 DIAGNOSIS — K70.30 ALCOHOLIC CIRRHOSIS OF LIVER WITHOUT ASCITES: ICD-10-CM

## 2023-03-24 DIAGNOSIS — Z12.11 ENCOUNTER FOR SCREENING FOR MALIGNANT NEOPLASM OF COLON: ICD-10-CM

## 2023-03-24 PROCEDURE — 88305 TISSUE EXAM BY PATHOLOGIST: CPT | Performed by: PATHOLOGY

## 2023-03-24 PROCEDURE — 43239 EGD BIOPSY SINGLE/MULTIPLE: CPT | Performed by: INTERNAL MEDICINE

## 2023-03-24 PROCEDURE — 88312 SPECIAL STAINS GROUP 1: CPT | Performed by: PATHOLOGY

## 2023-03-24 PROCEDURE — 45385 COLONOSCOPY W/LESION REMOVAL: CPT | Mod: 33 | Performed by: INTERNAL MEDICINE

## 2023-03-29 LAB
PDF: PDF REPORT: (no result)
PDF: PDF REPORT: (no result)

## 2023-04-17 ENCOUNTER — OFFICE (OUTPATIENT)
Dept: URBAN - METROPOLITAN AREA CLINIC 18 | Facility: CLINIC | Age: 53
End: 2023-04-17
Payer: COMMERCIAL

## 2023-04-17 VITALS — SYSTOLIC BLOOD PRESSURE: 130 MMHG | WEIGHT: 231 LBS | HEIGHT: 65 IN | DIASTOLIC BLOOD PRESSURE: 60 MMHG

## 2023-04-17 DIAGNOSIS — G93.40 ENCEPHALOPATHY, UNSPECIFIED: ICD-10-CM

## 2023-04-17 DIAGNOSIS — R18.8 OTHER ASCITES: ICD-10-CM

## 2023-04-17 DIAGNOSIS — Z86.010 PERSONAL HISTORY OF COLONIC POLYPS: ICD-10-CM

## 2023-04-17 DIAGNOSIS — K70.31 ALCOHOLIC CIRRHOSIS OF LIVER WITH ASCITES: ICD-10-CM

## 2023-04-17 DIAGNOSIS — K57.30 DIVERTICULOSIS OF LARGE INTESTINE WITHOUT PERFORATION OR ABS: ICD-10-CM

## 2023-04-17 PROCEDURE — 99214 OFFICE O/P EST MOD 30 MIN: CPT | Performed by: INTERNAL MEDICINE

## 2023-04-17 RX ORDER — LACTULOSE 10 G/15ML
SOLUTION ORAL
Qty: 5400 | Refills: 5 | Status: ACTIVE

## 2023-08-15 ENCOUNTER — OFFICE (OUTPATIENT)
Dept: URBAN - METROPOLITAN AREA CLINIC 18 | Facility: CLINIC | Age: 53
End: 2023-08-15
Payer: COMMERCIAL

## 2023-08-15 VITALS
DIASTOLIC BLOOD PRESSURE: 70 MMHG | SYSTOLIC BLOOD PRESSURE: 124 MMHG | HEIGHT: 65 IN | HEART RATE: 87 BPM | WEIGHT: 238 LBS

## 2023-08-15 DIAGNOSIS — R14.0 ABDOMINAL DISTENSION (GASEOUS): ICD-10-CM

## 2023-08-15 DIAGNOSIS — R18.8 OTHER ASCITES: ICD-10-CM

## 2023-08-15 DIAGNOSIS — G93.40 ENCEPHALOPATHY, UNSPECIFIED: ICD-10-CM

## 2023-08-15 DIAGNOSIS — Z86.010 PERSONAL HISTORY OF COLONIC POLYPS: ICD-10-CM

## 2023-08-15 DIAGNOSIS — K57.30 DIVERTICULOSIS OF LARGE INTESTINE WITHOUT PERFORATION OR ABS: ICD-10-CM

## 2023-08-15 DIAGNOSIS — K70.31 ALCOHOLIC CIRRHOSIS OF LIVER WITH ASCITES: ICD-10-CM

## 2023-08-15 PROBLEM — K63.5 POLYP OF COLON: Status: ACTIVE | Noted: 2023-03-24

## 2023-08-15 PROCEDURE — 99214 OFFICE O/P EST MOD 30 MIN: CPT | Performed by: INTERNAL MEDICINE

## 2024-02-15 ENCOUNTER — OFFICE (OUTPATIENT)
Dept: URBAN - METROPOLITAN AREA CLINIC 18 | Facility: CLINIC | Age: 54
End: 2024-02-15
Payer: COMMERCIAL

## 2024-02-15 VITALS — HEIGHT: 65 IN | DIASTOLIC BLOOD PRESSURE: 78 MMHG | SYSTOLIC BLOOD PRESSURE: 140 MMHG | WEIGHT: 245.2 LBS

## 2024-02-15 DIAGNOSIS — K70.30 ALCOHOLIC CIRRHOSIS OF LIVER WITHOUT ASCITES: ICD-10-CM

## 2024-02-15 DIAGNOSIS — G93.40 ENCEPHALOPATHY, UNSPECIFIED: ICD-10-CM

## 2024-02-15 DIAGNOSIS — K57.30 DIVERTICULOSIS OF LARGE INTESTINE WITHOUT PERFORATION OR ABS: ICD-10-CM

## 2024-02-15 DIAGNOSIS — D64.9 ANEMIA, UNSPECIFIED: ICD-10-CM

## 2024-02-15 PROCEDURE — 99214 OFFICE O/P EST MOD 30 MIN: CPT | Performed by: INTERNAL MEDICINE

## 2024-07-16 ENCOUNTER — OFFICE (OUTPATIENT)
Dept: URBAN - METROPOLITAN AREA CLINIC 18 | Facility: CLINIC | Age: 54
End: 2024-07-16
Payer: COMMERCIAL

## 2024-07-16 VITALS
HEIGHT: 65 IN | DIASTOLIC BLOOD PRESSURE: 80 MMHG | HEART RATE: 88 BPM | SYSTOLIC BLOOD PRESSURE: 136 MMHG | WEIGHT: 250 LBS

## 2024-07-16 DIAGNOSIS — G93.40 ENCEPHALOPATHY, UNSPECIFIED: ICD-10-CM

## 2024-07-16 DIAGNOSIS — K57.30 DIVERTICULOSIS OF LARGE INTESTINE WITHOUT PERFORATION OR ABS: ICD-10-CM

## 2024-07-16 DIAGNOSIS — D64.9 ANEMIA, UNSPECIFIED: ICD-10-CM

## 2024-07-16 DIAGNOSIS — K70.31 ALCOHOLIC CIRRHOSIS OF LIVER WITH ASCITES: ICD-10-CM

## 2024-07-16 DIAGNOSIS — K92.1 MELENA: ICD-10-CM

## 2024-07-16 PROCEDURE — 99214 OFFICE O/P EST MOD 30 MIN: CPT | Performed by: INTERNAL MEDICINE

## 2024-07-20 LAB
CBC, PLATELET CT  AND  DIFF: ABS BASOPHIL: 0 K/UL
CBC, PLATELET CT  AND  DIFF: ABS EOSINOPHIL: 0.2 K/UL
CBC, PLATELET CT  AND  DIFF: ABS IMMATURE GRANS: 0 K/UL
CBC, PLATELET CT  AND  DIFF: ABS LYMPHOCYTE: 1 K/UL
CBC, PLATELET CT  AND  DIFF: ABS MONOCYTE: 0.6 K/UL
CBC, PLATELET CT  AND  DIFF: ABS NEUTROPHIL: 4.7 K/UL
CBC, PLATELET CT  AND  DIFF: BASOPHIL: 0.5 %
CBC, PLATELET CT  AND  DIFF: DIFFERENTIAL: (no result)
CBC, PLATELET CT  AND  DIFF: EOSINOPHIL: 3.1 %
CBC, PLATELET CT  AND  DIFF: HEMATOCRIT: 36.8 % — LOW
CBC, PLATELET CT  AND  DIFF: HEMOGLOBIN: 12.3 G/DL — LOW
CBC, PLATELET CT  AND  DIFF: IMMATURE GRANULOCYTES: 0.5 %
CBC, PLATELET CT  AND  DIFF: LYMPHOCYTE: 15.5 %
CBC, PLATELET CT  AND  DIFF: MCH: 30.5 PG
CBC, PLATELET CT  AND  DIFF: MCHC: 33.4 G/DL
CBC, PLATELET CT  AND  DIFF: MCV: 91.3 FL
CBC, PLATELET CT  AND  DIFF: MONOCYTE: 8.6 %
CBC, PLATELET CT  AND  DIFF: MPV: 11.8 FL — HIGH
CBC, PLATELET CT  AND  DIFF: NEUTROPHIL: 71.8 %
CBC, PLATELET CT  AND  DIFF: NRBCS: 0 /100 WBC
CBC, PLATELET CT  AND  DIFF: PLATELET COUNT: 104 K/UL — LOW
CBC, PLATELET CT  AND  DIFF: RBC: 4.03 M/UL — LOW
CBC, PLATELET CT  AND  DIFF: RDW: 14.2 %
CBC, PLATELET CT  AND  DIFF: WBC COUNT: 6.5 K/UL

## 2024-08-19 PROBLEM — K57.30 DIVERTICULOSIS OF LARGE INTESTINE WITHOUT PERFORATION OR ABS: Status: ACTIVE | Noted: 2023-03-24

## 2025-08-20 ENCOUNTER — OFFICE (OUTPATIENT)
Dept: URBAN - METROPOLITAN AREA CLINIC 18 | Age: 55
End: 2025-08-20
Payer: COMMERCIAL

## 2025-08-20 VITALS
RESPIRATION RATE: 98 BRPM | DIASTOLIC BLOOD PRESSURE: 68 MMHG | SYSTOLIC BLOOD PRESSURE: 134 MMHG | HEIGHT: 65 IN | WEIGHT: 253 LBS | HEART RATE: 87 BPM

## 2025-08-20 DIAGNOSIS — Z86.0100 PERSONAL HISTORY OF COLON POLYPS, UNSPECIFIED: ICD-10-CM

## 2025-08-20 DIAGNOSIS — G93.40 ENCEPHALOPATHY, UNSPECIFIED: ICD-10-CM

## 2025-08-20 DIAGNOSIS — D64.9 ANEMIA, UNSPECIFIED: ICD-10-CM

## 2025-08-20 DIAGNOSIS — K57.30 DIVERTICULOSIS OF LARGE INTESTINE WITHOUT PERFORATION OR ABS: ICD-10-CM

## 2025-08-20 DIAGNOSIS — K70.31 ALCOHOLIC CIRRHOSIS OF LIVER WITH ASCITES: ICD-10-CM

## 2025-08-20 PROCEDURE — 99214 OFFICE O/P EST MOD 30 MIN: CPT | Performed by: INTERNAL MEDICINE

## 2025-08-20 RX ORDER — RIFAXIMIN 550 MG/1
TABLET ORAL
Qty: 60 | Refills: 5 | Status: ACTIVE
Start: 2022-04-05

## (undated) DEVICE — DRAPE,U/ SHT,SPLIT,PLAS,STERIL: Brand: MEDLINE

## (undated) DEVICE — BANDAGE,ELASTIC,ESMARK,STERILE,6"X9',LF: Brand: MEDLINE

## (undated) DEVICE — GLOVE SURG SZ 6 CRM LTX FREE POLYISOPRENE POLYMER BEAD ANTI

## (undated) DEVICE — CONVERTORS STOCKINETTE: Brand: CONVERTORS

## (undated) DEVICE — GOWN,ECLIPSE,POLYRNF,BRTHSLV,L,30/CS: Brand: MEDLINE

## (undated) DEVICE — SPONGE GZ W4XL8IN COT WVN 12 PLY

## (undated) DEVICE — SPONGE LAP W18XL18IN WHT COT 4 PLY FLD STRUNG RADPQ DISP ST

## (undated) DEVICE — ZIMMER® STERILE DISPOSABLE TOURNIQUET CUFF WITH PLC, DUAL PORT, SINGLE BLADDER, 30 IN. (76 CM)

## (undated) DEVICE — SHEET,DRAPE,53X77,STERILE: Brand: MEDLINE

## (undated) DEVICE — TUBING, SUCTION, 9/32" X 10', STRAIGHT: Brand: MEDLINE

## (undated) DEVICE — INTENDED FOR TISSUE SEPARATION, AND OTHER PROCEDURES THAT REQUIRE A SHARP SURGICAL BLADE TO PUNCTURE OR CUT.: Brand: BARD-PARKER ® STAINLESS STEEL BLADES

## (undated) DEVICE — STERILE LATEX POWDER-FREE SURGICAL GLOVESWITH NITRILE COATING: Brand: PROTEXIS

## (undated) DEVICE — SUTURE FIBERWIRE 2 L97CM NONABSORBABLE BLU L36.6MM 1/2 CIR AR7206

## (undated) DEVICE — APPLICATOR MEDICATED 26 CC SOLUTION HI LT ORNG CHLORAPREP

## (undated) DEVICE — PADDING CAST W6INXL4YD COT LO LINTING WYTEX

## (undated) DEVICE — MARKER SURG SKIN UTIL REGULAR/FINE 2 TIP W/ RUL AND 9 LBL

## (undated) DEVICE — BANDAGE,SELF ADHRNT,COFLEX,4"X5YD,STRL: Brand: COLABEL

## (undated) DEVICE — C-ARM: Brand: UNBRANDED

## (undated) DEVICE — PACK,BASIC,2 GOWNS,NO DRAPES: Brand: MEDLINE

## (undated) DEVICE — SUTURE VCRL SZ 0 L18IN ABSRB UD L36MM CT-1 1/2 CIR J840D

## (undated) DEVICE — PENCIL ES CRD L10FT HND SWCHING ROCK SWCH W/ EDGE COAT BLDE

## (undated) DEVICE — C-ARMOR C-ARM EQUIPMENT COVERS CLEAR STERILE UNIVERSAL FIT 12 PER CASE: Brand: C-ARMOR

## (undated) DEVICE — SYSTEM SKIN CLSR 22CM DERMBND PRINEO

## (undated) DEVICE — SUTURE ABSORBABLE MONOFILAMENT 3-0 PS1 12 IN UD MONOCRYL + SXMP1B101

## (undated) DEVICE — GLOVE SURG SZ 65 L12IN FNGR THK79MIL GRN LTX FREE

## (undated) DEVICE — PADDING CAST W6INXL4YD COT COHESIVE HND TEARABLE SPEC 100

## (undated) DEVICE — GOWN,SIRUS,FABRNF,RAGLAN,XL,ST,28/CS: Brand: MEDLINE

## (undated) DEVICE — SPLINT ORTH W4XL15IN PLSTR OF PARIS LO EXOTHERM SMOOTH

## (undated) DEVICE — SUTURE VCRL SZ 2-0 L18IN ABSRB UD CT-1 L36MM 1/2 CIR J839D

## (undated) DEVICE — 3M™ STERI-DRAPE™ INSTRUMENT POUCH 1018: Brand: STERI-DRAPE™

## (undated) DEVICE — TOWEL,OR,DSP,ST,BLUE,STD,6/PK,12PK/CS: Brand: MEDLINE

## (undated) DEVICE — DRAPE,EXTREMITY,89X128,STERILE: Brand: MEDLINE

## (undated) DEVICE — SYRINGE IRRIG 60ML SFT PLIABLE BLB EZ TO GRP 1 HND USE W/